# Patient Record
Sex: FEMALE | Race: WHITE | NOT HISPANIC OR LATINO | Employment: OTHER | URBAN - METROPOLITAN AREA
[De-identification: names, ages, dates, MRNs, and addresses within clinical notes are randomized per-mention and may not be internally consistent; named-entity substitution may affect disease eponyms.]

---

## 2017-01-04 ENCOUNTER — OFFICE VISIT (OUTPATIENT)
Dept: MEDICAL GROUP | Facility: MEDICAL CENTER | Age: 77
End: 2017-01-04
Payer: MEDICARE

## 2017-01-04 VITALS
BODY MASS INDEX: 28.61 KG/M2 | SYSTOLIC BLOOD PRESSURE: 124 MMHG | RESPIRATION RATE: 16 BRPM | TEMPERATURE: 97.8 F | OXYGEN SATURATION: 95 % | HEART RATE: 80 BPM | HEIGHT: 66 IN | DIASTOLIC BLOOD PRESSURE: 64 MMHG | WEIGHT: 178 LBS

## 2017-01-04 DIAGNOSIS — F41.9 ANXIETY: ICD-10-CM

## 2017-01-04 DIAGNOSIS — E78.5 DYSLIPIDEMIA: ICD-10-CM

## 2017-01-04 DIAGNOSIS — M81.0 OSTEOPOROSIS: ICD-10-CM

## 2017-01-04 DIAGNOSIS — R63.5 WEIGHT GAIN: ICD-10-CM

## 2017-01-04 DIAGNOSIS — I10 ESSENTIAL HYPERTENSION: ICD-10-CM

## 2017-01-04 DIAGNOSIS — R53.82 CHRONIC FATIGUE: ICD-10-CM

## 2017-01-04 PROCEDURE — 99214 OFFICE O/P EST MOD 30 MIN: CPT | Performed by: NURSE PRACTITIONER

## 2017-01-04 ASSESSMENT — PATIENT HEALTH QUESTIONNAIRE - PHQ9: CLINICAL INTERPRETATION OF PHQ2 SCORE: 0

## 2017-01-04 NOTE — ASSESSMENT & PLAN NOTE
DEXA scan from June of this year with high risk for fracture  Started on Fosamax at that time, she took this for several weeks and felt that it was causing bone and joint pain, she has since discontinued

## 2017-01-04 NOTE — MR AVS SNAPSHOT
"        Kelly Bello   2017 8:40 AM   Office Visit   MRN: [id number]    Department:  [facility]   Dept Phone:  [phone]    Description:  Female : 1940   Provider:  RAUDEL Cazares           Reason for Visit     Follow-Up anxiety, weight gain, bone density       Allergies as of 2017     No Known Allergies      You were diagnosed with     Essential hypertension   [3609576]       Chronic fatigue   [998957]       Weight gain   [735630]       Anxiety   [111191]       Dyslipidemia   [405248]       Osteoporosis   [4200141]         Vital Signs     Blood Pressure Pulse Temperature Respirations Height Weight    124/64 mmHg 80 36.6 °C (97.8 °F) 16 1.676 m (5' 5.98\") 80.74 kg (178 lb)    Body Mass Index Oxygen Saturation Smoking Status             28.74 kg/m2 95% Former Smoker         Basic Information     Date Of Birth Sex Race Ethnicity Preferred Language    1940 Female White Non- English      Problem List              ICD-10-CM Priority Class Noted - Resolved    History of MI (myocardial infarction) I25.2   2015 - Present    COPD (chronic obstructive pulmonary disease) (HCC) J44.9   2015 - Present    Elevated white blood cell count D72.829   2015 - Present    Decreased GFR R94.4   2015 - Present    Hypertension I10   2015 - Present    Bell palsy G51.0   2015 - Present    VERONA positive R76.8   1/15/2016 - Present    Raynaud's disease I73.00   1/15/2016 - Present    Hair thinning L65.9   1/15/2016 - Present    Chronic fatigue R53.82   1/15/2016 - Present    Anxiety F41.9   2016 - Present    Gastroesophageal reflux disease without esophagitis K21.9   2016 - Present    Left cataract H26.9   2016 - Present    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   11/15/2016 - Present    Dyslipidemia E78.5   2017 - Present    Osteoporosis M81.0   2017 - Present      Health Maintenance        Date Due Completion Dates    IMM DTaP/Tdap/Td " Vaccine (1 - Tdap) 7/31/1958 ---    IMM ZOSTER VACCINE 7/31/1999 ---    IMM PNEUMOCOCCAL 65+ (ADULT) LOW/MEDIUM RISK SERIES (1 of 2 - PCV13) 7/31/2004 ---    COLONOSCOPY 1/29/2017 1/29/2007 (Prv Comp)    Override on 1/29/2007: Previously completed    MAMMOGRAM 6/30/2017 6/30/2016, 6/23/2016    BONE DENSITY 6/30/2021 6/30/2016            Current Immunizations     Influenza Vaccine Adult HD 11/17/2016    Influenza Vaccine Quad Inj (Pf) 11/19/2015      Below and/or attached are the medications your provider expects you to take. Review all of your home medications and newly ordered medications with your provider and/or pharmacist. Follow medication instructions as directed by your provider and/or pharmacist. Please keep your medication list with you and share with your provider. Update the information when medications are discontinued, doses are changed, or new medications (including over-the-counter products) are added; and carry medication information at all times in the event of emergency situations     Allergies:  No Known Allergies          Medications  Valid as of: January 04, 2017 - 11:01 AM    Generic Name Brand Name Tablet Size Instructions for use    Albuterol Sulfate (Aero Soln) albuterol 108 (90 BASE) MCG/ACT Inhale 2 Puffs by mouth every 6 hours as needed for Shortness of Breath.        Atorvastatin Calcium (Tab) LIPITOR 20 MG TAKE 1 TAB BY MOUTH EVERY DAY.        Clopidogrel Bisulfate (Tab) PLAVIX 75 MG TAKE 1 TAB BY MOUTH EVERY DAY.        Ferrous Sulfate (Tab) Iron 325 (65 FE) MG Take 1 Tab by mouth 2 times a day, with meals.        Fluticasone Propionate (Suspension) FLONASE 50 MCG/ACT Spray 2 Sprays in nose every day.        HydroCHLOROthiazide (Tab) HYDRODIURIL 25 MG Take 1 Tab by mouth every day.        Naproxen Sodium   Take 1 Tab by mouth as needed.        Omeprazole (CAPSULE DELAYED RELEASE) PRILOSEC 20 MG TAKE 1 CAPSULE EVERY DAY        Sertraline HCl (Tab) ZOLOFT 50 MG Take 1 Tab by mouth every  day.        Triamcinolone Acetonide (Cream) KENALOG 0.1 % Apply 2-4 g to affected area(s) 2 times a day.        .                 Medicines prescribed today were sent to:     [id number] [place], [place] - [address]    [address] [facility] [place] [address]    Phone: [phone] Fax: [phone]    Open 24 Hours?: No      Medication refill instructions:       If your prescription bottle indicates you have medication refills left, it is not necessary to call your provider’s office. Please contact your pharmacy and they will refill your medication.    If your prescription bottle indicates you do not have any refills left, you may request refills at any time through one of the following ways: The online [employer] system (except Urgent Care), by calling your provider’s office, or by asking your pharmacy to contact your provider’s office with a refill request. Medication refills are processed only during regular business hours and may not be available until the next business day. Your provider may request additional information or to have a follow-up visit with you prior to refilling your medication.   *Please Note: Medication refills are assigned a new Rx number when refilled electronically. Your pharmacy may indicate that no refills were authorized even though a new prescription for the same medication is available at the pharmacy. Please request the medicine by name with the pharmacy before contacting your provider for a refill.        Your To Do List     Future Labs/Procedures Complete By Expires    COMP METABOLIC PANEL  As directed 1/5/2018    LIPID PROFILE  As directed 1/5/2018    TSH WITH REFLEX TO FT4  As directed 1/4/2018         [employer] Access Code: [email or web address]  Expires: 1/13/2017  8:55 AM    [employer]  A secure, online tool to manage your health information     [facility]’s [employer]® is a secure, online tool that connects you to your personalized health information from the privacy of your home -- day  or night - making it very easy for you to manage your healthcare. Once the activation process is completed, you can even access your medical information using the GoPlanit faith, which is available for free in the [employer] Faith store or Google Play store.     GoPlanit provides the following levels of access (as shown below):   My Chart Features   [facility] Doctor [facility]  Specialists [facility]  Urgent  Care Non-[facility]  Primary Care  Doctor   Email your healthcare team securely and privately 24/7 X X X    Manage appointments: schedule your next appointment; view details of past/upcoming appointments X      Request prescription refills. X      View recent personal medical records, including lab and immunizations X X X X   View health record, including health history, allergies, medications X X X X   Read reports about your outpatient visits, procedures, consult and ER notes X X X X   See your discharge summary, which is a recap of your hospital and/or ER visit that includes your diagnosis, lab results, and care plan. X X       How to register for GoPlanit:  1. Go to  [email or web address].  2. Click on the Sign Up Now box, which takes you to the New Member Sign Up page. You will need to provide the following information:  a. Enter your GoPlanit Access Code exactly as it appears at the top of this page. (You will not need to use this code after you’ve completed the sign-up process. If you do not sign up before the expiration date, you must request a new code.)   b. Enter your date of birth.   c. Enter your home email address.   d. Click Submit, and follow the next screen’s instructions.  3. Create a GoPlanit ID. This will be your GoPlanit login ID and cannot be changed, so think of one that is secure and easy to remember.  4. Create a GoPlanit password. You can change your password at any time.  5. Enter your Password Reset Question and Answer. This can be used at a later time if you forget your password.   6. Enter your  e-mail address. This allows you to receive e-mail notifications when new information is available in [employer].  7. Click Sign Up. You can now view your health information.    For assistance activating your [employer] account, call [phone]

## 2017-01-04 NOTE — PROGRESS NOTES
Subjective:     Chief Complaint   Patient presents with   • Follow-Up     anxiety, weight gain, bone density      Kelly Bello is a 76 y.o. female here today to follow up on:    Anxiety  started trial of Paxil in November, took this for approximately 2 weeks. Felt that it was interfering with her sleep, she was generally feeling irritable. Has failed Celexa and Wellbutrin in the past, she is interested in alternative. She continues having slight anxiety, low mood  No appetite change, no panic attack, no suicidal ideation    Chronic fatigue  Continues to complain of generally poor energy level despite getting adequate rest. She is gradually been gaining weight per her report although he sees no significant weight gain in the last 6 months per our scale. She does have intermittent constipation, dry skin  No heat or cold intolerance    Dyslipidemia  Tolerating atorvastatin without issue, due to recheck labs    Hypertension  Blood pressure remained stable with hydrochlorothiazide. No chest pain, palpitation, dizziness    Osteoporosis  DEXA scan from June of this year with high risk for fracture  Started on Fosamax at that time, she took this for several weeks and felt that it was causing bone and joint pain, she has since discontinued       Current medicines (including changes today)  Current Outpatient Prescriptions   Medication Sig Dispense Refill   • clopidogrel (PLAVIX) 75 MG Tab TAKE 1 TAB BY MOUTH EVERY DAY. 90 Tab 1   • atorvastatin (LIPITOR) 20 MG Tab TAKE 1 TAB BY MOUTH EVERY DAY. 90 Tab 1   • omeprazole (PRILOSEC) 20 MG delayed-release capsule TAKE 1 CAPSULE EVERY DAY 90 Cap 1   • triamcinolone acetonide (KENALOG) 0.1 % Cream Apply 2-4 g to affected area(s) 2 times a day. 1 Tube 0   • hydrochlorothiazide (HYDRODIURIL) 25 MG Tab Take 1 Tab by mouth every day. 90 Tab 1   • Ferrous Sulfate (IRON) 325 (65 FE) MG TABS Take 1 Tab by mouth 2 times a day, with meals.     • Naproxen Sodium (ALEVE PO) Take 1 Tab  "by mouth as needed.     • albuterol (VENTOLIN OR PROVENTIL) 108 (90 BASE) MCG/ACT Aero Soln inhalation aerosol Inhale 2 Puffs by mouth every 6 hours as needed for Shortness of Breath. 8.5 g 3   • fluticasone (FLONASE) 50 MCG/ACT nasal spray Spray 2 Sprays in nose every day. 1 Bottle 0     No current facility-administered medications for this visit.     She  has a past medical history of Heart burn; Dental disorder; Indigestion; Venous disease; Hypertension (4/1/2015); COPD (chronic obstructive pulmonary disease) ([facility]); Myocardial infarct ([facility]) (1996); Hiatus hernia syndrome; Anesthesia; Cancer ([facility]) (2006); Emphysema of lung ([facility]); Pneumonia (2007); and Shingles (2014).    ROS included above     Objective:     Blood pressure 124/64, pulse 80, temperature 36.6 °C (97.8 °F), resp. rate 16, height 1.676 m (5' 5.98\"), weight 80.74 kg (178 lb), SpO2 95 %. Body mass index is 28.74 kg/(m^2).     Physical Exam:  General: Alert, oriented in no acute distress.  Eye contact is good, speech is normal, affect calm  Lungs: clear to auscultation bilaterally, normal effort, no wheeze/ rhonchi/ rales.  CV: regular rate and rhythm, S1, S2  Abdomen: soft, nontender  Ext: no edema, color normal, vascularity normal, temperature normal. Dry skin   Assessment and Plan:   The following treatment plan was discussed   1. Essential hypertension   stable, continue medication  COMP METABOLIC PANEL   2. Chronic fatigue   ongoing difficulty with energy level despite getting adequate rest.  TSH WITH REFLEX TO FT4   3. Weight gain  TSH WITH REFLEX TO FT4   4. Anxiety   no improvement with Paxil, Celexa, Wellbutrin. She would like to try alternative. May start trial of Zoloft 50 mg daily at bedtime. Risks benefits and side effects reviewed, she will notify me of any concerns. discussed need for sustained treatment to obtain results    5. Dyslipidemia   on atorvastatin, recheck labs  LIPID PROFILE   6. Osteoporosis   did not tolerate Fosamax. Will " refer for prolia        Followup: pending labs           Please note that this dictation was created using voice recognition software. I have worked with consultants from the vendor as well as technical experts from [facility] to optimize the interface. I have made every reasonable attempt to correct obvious errors, but I expect that there are errors of grammar and possibly content that I did not discover before finalizing the note.

## 2017-01-04 NOTE — ASSESSMENT & PLAN NOTE
Continues to complain of generally poor energy level despite getting adequate rest. She is gradually been gaining weight per her report although he sees no significant weight gain in the last 6 months per our scale. She does have intermittent constipation, dry skin  No heat or cold intolerance

## 2017-01-04 NOTE — ASSESSMENT & PLAN NOTE
started trial of Paxil in November, took this for approximately 2 weeks. Felt that it was interfering with her sleep, she was generally feeling irritable. Has failed Celexa and Wellbutrin in the past, she is interested in alternative. She continues having slight anxiety, low mood  No appetite change, no panic attack, no suicidal ideation

## 2017-01-10 ENCOUNTER — HOSPITAL ENCOUNTER (OUTPATIENT)
Dept: LAB | Facility: MEDICAL CENTER | Age: 77
End: 2017-01-10
Attending: NURSE PRACTITIONER
Payer: MEDICARE

## 2017-01-10 ENCOUNTER — TELEPHONE (OUTPATIENT)
Dept: MEDICAL GROUP | Facility: MEDICAL CENTER | Age: 77
End: 2017-01-10

## 2017-01-10 DIAGNOSIS — R53.82 CHRONIC FATIGUE: ICD-10-CM

## 2017-01-10 DIAGNOSIS — I10 ESSENTIAL HYPERTENSION: ICD-10-CM

## 2017-01-10 DIAGNOSIS — R63.5 WEIGHT GAIN: ICD-10-CM

## 2017-01-10 DIAGNOSIS — E78.5 DYSLIPIDEMIA: ICD-10-CM

## 2017-01-10 LAB
ALBUMIN SERPL BCP-MCNC: 4.1 G/DL (ref 3.2–4.9)
ALBUMIN/GLOB SERPL: 1.2 G/DL
ALP SERPL-CCNC: 62 U/L (ref 30–99)
ALT SERPL-CCNC: 11 U/L (ref 2–50)
ANION GAP SERPL CALC-SCNC: 5 MMOL/L (ref 0–11.9)
AST SERPL-CCNC: 14 U/L (ref 12–45)
BILIRUB SERPL-MCNC: 0.5 MG/DL (ref 0.1–1.5)
BUN SERPL-MCNC: 22 MG/DL (ref 8–22)
CALCIUM SERPL-MCNC: 9.4 MG/DL (ref 8.5–10.5)
CHLORIDE SERPL-SCNC: 102 MMOL/L (ref 96–112)
CHOLEST SERPL-MCNC: 145 MG/DL (ref 100–199)
CO2 SERPL-SCNC: 30 MMOL/L (ref 20–33)
CREAT SERPL-MCNC: 1.2 MG/DL (ref 0.5–1.4)
GLOBULIN SER CALC-MCNC: 3.4 G/DL (ref 1.9–3.5)
GLUCOSE SERPL-MCNC: 107 MG/DL (ref 65–99)
HDLC SERPL-MCNC: 36 MG/DL
LDLC SERPL CALC-MCNC: 82 MG/DL
POTASSIUM SERPL-SCNC: 3.8 MMOL/L (ref 3.6–5.5)
PROT SERPL-MCNC: 7.5 G/DL (ref 6–8.2)
SODIUM SERPL-SCNC: 137 MMOL/L (ref 135–145)
TRIGL SERPL-MCNC: 134 MG/DL (ref 0–149)
TSH SERPL DL<=0.005 MIU/L-ACNC: 1.4 UIU/ML (ref 0.3–3.7)

## 2017-01-10 PROCEDURE — 80061 LIPID PANEL: CPT

## 2017-01-10 PROCEDURE — 84443 ASSAY THYROID STIM HORMONE: CPT

## 2017-01-10 PROCEDURE — 80053 COMPREHEN METABOLIC PANEL: CPT

## 2017-01-10 PROCEDURE — 36415 COLL VENOUS BLD VENIPUNCTURE: CPT

## 2017-01-10 NOTE — TELEPHONE ENCOUNTER
----- Message from RAUDEL Cazares sent at 1/10/2017  2:32 PM PST -----  Please inform patient:  Thyroid function looks good. Kidney function, liver function, electrolytes normal. Cholesterol is looking much better than in the past- continue with current medications.

## 2017-03-03 RX ORDER — HYDROCHLOROTHIAZIDE 25 MG/1
TABLET ORAL
Qty: 30 TAB | Refills: 5 | Status: SHIPPED | OUTPATIENT
Start: 2017-03-03 | End: 2017-12-11

## 2017-03-16 ENCOUNTER — OFFICE VISIT (OUTPATIENT)
Dept: URGENT CARE | Facility: PHYSICIAN GROUP | Age: 77
End: 2017-03-16
Payer: MEDICARE

## 2017-03-16 VITALS
RESPIRATION RATE: 16 BRPM | WEIGHT: 165 LBS | HEART RATE: 68 BPM | DIASTOLIC BLOOD PRESSURE: 78 MMHG | SYSTOLIC BLOOD PRESSURE: 124 MMHG | TEMPERATURE: 97.5 F | BODY MASS INDEX: 26.52 KG/M2 | HEIGHT: 66 IN | OXYGEN SATURATION: 92 %

## 2017-03-16 DIAGNOSIS — J44.1 COPD EXACERBATION (HCC): ICD-10-CM

## 2017-03-16 DIAGNOSIS — R05.9 COUGH: ICD-10-CM

## 2017-03-16 PROCEDURE — G8432 DEP SCR NOT DOC, RNG: HCPCS | Performed by: EMERGENCY MEDICINE

## 2017-03-16 PROCEDURE — 1036F TOBACCO NON-USER: CPT | Performed by: EMERGENCY MEDICINE

## 2017-03-16 PROCEDURE — 1101F PT FALLS ASSESS-DOCD LE1/YR: CPT | Performed by: EMERGENCY MEDICINE

## 2017-03-16 PROCEDURE — G8482 FLU IMMUNIZE ORDER/ADMIN: HCPCS | Performed by: EMERGENCY MEDICINE

## 2017-03-16 PROCEDURE — 4040F PNEUMOC VAC/ADMIN/RCVD: CPT | Mod: 8P | Performed by: EMERGENCY MEDICINE

## 2017-03-16 PROCEDURE — G8420 CALC BMI NORM PARAMETERS: HCPCS | Performed by: EMERGENCY MEDICINE

## 2017-03-16 PROCEDURE — 99213 OFFICE O/P EST LOW 20 MIN: CPT | Performed by: EMERGENCY MEDICINE

## 2017-03-16 RX ORDER — AMOXICILLIN AND CLAVULANATE POTASSIUM 875; 125 MG/1; MG/1
1 TABLET, FILM COATED ORAL 2 TIMES DAILY
Qty: 20 TAB | Refills: 0 | Status: SHIPPED | OUTPATIENT
Start: 2017-03-16 | End: 2017-03-26

## 2017-03-16 RX ORDER — BENZONATATE 100 MG/1
100 CAPSULE ORAL 3 TIMES DAILY PRN
Qty: 60 CAP | Refills: 0 | Status: SHIPPED | OUTPATIENT
Start: 2017-03-16 | End: 2017-07-05

## 2017-03-16 ASSESSMENT — ENCOUNTER SYMPTOMS
NERVOUS/ANXIOUS: 0
HEMOPTYSIS: 0
CHILLS: 0
BACK PAIN: 0
SHORTNESS OF BREATH: 0
SPUTUM PRODUCTION: 1
SPEECH CHANGE: 0
FEVER: 1
ABDOMINAL PAIN: 0
SENSORY CHANGE: 0
SORE THROAT: 0
NECK PAIN: 0
COUGH: 1
NAUSEA: 0
VOMITING: 0

## 2017-03-16 ASSESSMENT — COPD QUESTIONNAIRES: COPD: 1

## 2017-03-16 NOTE — PROGRESS NOTES
"Subjective:      Kelly Bello is a 76 y.o. female who presents with Sinusitis            Sinusitis  This is a new problem. The current episode started in the past 7 days. The problem has been gradually worsening since onset. The maximum temperature recorded prior to her arrival was 100.4 - 100.9 F. The pain is moderate. Associated symptoms include congestion and coughing. Pertinent negatives include no chills, neck pain, shortness of breath, sneezing or sore throat.   Cough  This is a new problem. The current episode started in the past 7 days. The cough is productive of purulent sputum. Associated symptoms include a fever. Pertinent negatives include no chills, hemoptysis, rash, sore throat or shortness of breath. She has tried a beta-agonist inhaler for the symptoms. Her past medical history is significant for COPD.   No Known Allergies   Social History     Social History   • Marital Status: Single     Spouse Name: N/A   • Number of Children: N/A   • Years of Education: N/A     Occupational History   • Not on file.     Social History Main Topics   • Smoking status: Former Smoker -- 2.00 packs/day for 25 years     Types: Cigarettes     Quit date: 06/06/1996   • Smokeless tobacco: Never Used   • Alcohol Use: 0.0 oz/week     0 Standard drinks or equivalent per week      Comment: once a week   • Drug Use: No   • Sexual Activity: No     Other Topics Concern   • Not on file     Social History Narrative    Works at [employer], lives in [place], , lives alone with daughters nearby     Past Medical History   Diagnosis Date   • Heart burn    • Dental disorder      dentures   • Indigestion    • Venous disease      left leg vein removal   • Hypertension 4/1/2015   • COPD (chronic obstructive pulmonary disease) (CMS-HCC)    • Myocardial infarct (CMS-HCC) 1996   • Hiatus hernia syndrome    • Anesthesia      Once she had too much anesthesia \"didnt come out of it for 6hours\"   • Cancer (CMS-HCC) 2006     esophageal " cancer polyp. Repeat endoscopy 3-4 years later was benign.    • Emphysema of lung (CMS-HCC)    • Pneumonia 2007   • Shingles 2014     Current Outpatient Prescriptions on File Prior to Visit   Medication Sig Dispense Refill   • hydrochlorothiazide (HYDRODIURIL) 25 MG Tab TAKE 1 TAB BY MOUTH EVERY DAY. 30 Tab 5   • sertraline (ZOLOFT) 50 MG Tab Take 1 Tab by mouth every day. 30 Tab 1   • clopidogrel (PLAVIX) 75 MG Tab TAKE 1 TAB BY MOUTH EVERY DAY. 90 Tab 1   • atorvastatin (LIPITOR) 20 MG Tab TAKE 1 TAB BY MOUTH EVERY DAY. 90 Tab 1   • omeprazole (PRILOSEC) 20 MG delayed-release capsule TAKE 1 CAPSULE EVERY DAY 90 Cap 1   • triamcinolone acetonide (KENALOG) 0.1 % Cream Apply 2-4 g to affected area(s) 2 times a day. 1 Tube 0   • hydrochlorothiazide (HYDRODIURIL) 25 MG Tab Take 1 Tab by mouth every day. 90 Tab 1   • albuterol (VENTOLIN OR PROVENTIL) 108 (90 BASE) MCG/ACT Aero Soln inhalation aerosol Inhale 2 Puffs by mouth every 6 hours as needed for Shortness of Breath. 8.5 g 3   • fluticasone (FLONASE) 50 MCG/ACT nasal spray Spray 2 Sprays in nose every day. 1 Bottle 0   • Ferrous Sulfate (IRON) 325 (65 FE) MG TABS Take 1 Tab by mouth 2 times a day, with meals.     • Naproxen Sodium (ALEVE PO) Take 1 Tab by mouth as needed.       No current facility-administered medications on file prior to visit.     Family History   Problem Relation Age of Onset   • Cancer Father      lung, smoker   • Cancer Brother      kidney, liver   • Arthritis Sister      Rheumatoid Arthritis       Review of Systems   Constitutional: Positive for fever. Negative for chills.   HENT: Positive for congestion. Negative for ear discharge, nosebleeds, sneezing and sore throat.    Respiratory: Positive for cough and sputum production. Negative for hemoptysis and shortness of breath.    Gastrointestinal: Negative for nausea, vomiting and abdominal pain.   Genitourinary: Negative.    Musculoskeletal: Negative for back pain and neck pain.   Skin:  "Negative for rash.   Neurological: Negative for sensory change and speech change.   Psychiatric/Behavioral: The patient is not nervous/anxious.           Objective:     /78 mmHg  Pulse 68  Temp(Src) 36.4 °C (97.5 °F)  Resp 16  Ht 1.676 m (5' 6\")  Wt 74.844 kg (165 lb)  BMI 26.64 kg/m2  SpO2 92%     Physical Exam   Constitutional: She appears well-developed and well-nourished. No distress.   Patient states she is not short of breath but states her pulse oximetries usually in the 95 range unable to recorded greater than 90-93. Patient declined chest x-ray.   HENT:   Head: Normocephalic and atraumatic.   Right Ear: External ear normal.   Left Ear: External ear normal.   Eyes: Right eye exhibits no discharge. Left eye exhibits no discharge.   Cardiovascular: Normal rate and regular rhythm.    Pulmonary/Chest: Effort normal. No respiratory distress. She has no wheezes.   Abdominal: She exhibits no distension.   Skin: Skin is warm. She is not diaphoretic.   Psychiatric: She has a normal mood and affect. Her behavior is normal.   Vitals reviewed.            Repeat pulse oximetry 93% in multiple fingers patient declined x-ray.  Assessment/Plan:     Diagnosis: COPD exacerbation/sinusitis    I am recommending the patient initiate/ continue hydration efforts including the use of a vaporizer/humidifier patient states her daughter has an vaporizer/ netti pot. I also recommend the pt, initiate Mucinex.. In addition the patient will initiate the prescribed prescription medication/s: Patient will be placed on Augmentin 875/125, also given Tessalon Perles patient will continue using her inhaler... If the patient's condition exacerbates with worsening dysphagia, shortness of breath, uncontrolled fever, headache or chest pressure he/she will return immediately to the urgent care or go to  the emergency department for further evaluation. Patient will follow-up with her primary care provider if symptoms continue or " bradley Kerr

## 2017-04-05 DIAGNOSIS — I10 ESSENTIAL HYPERTENSION: ICD-10-CM

## 2017-04-05 RX ORDER — HYDROCHLOROTHIAZIDE 25 MG/1
25 TABLET ORAL DAILY
Qty: 90 TAB | Refills: 1 | Status: SHIPPED | OUTPATIENT
Start: 2017-04-05 | End: 2017-07-05

## 2017-04-05 NOTE — TELEPHONE ENCOUNTER
Was the patient seen in the last year in this department? Yes     Does patient have an active prescription for medications requested? No     Received Request Via: Pharmacy     Pt is there now, asking to approve a 3 month supply as this is what insurance will approve.

## 2017-05-03 ENCOUNTER — OFFICE VISIT (OUTPATIENT)
Dept: URGENT CARE | Facility: PHYSICIAN GROUP | Age: 77
End: 2017-05-03
Payer: MEDICARE

## 2017-05-03 VITALS
WEIGHT: 165 LBS | BODY MASS INDEX: 26.52 KG/M2 | TEMPERATURE: 98.3 F | HEART RATE: 79 BPM | SYSTOLIC BLOOD PRESSURE: 124 MMHG | HEIGHT: 66 IN | DIASTOLIC BLOOD PRESSURE: 66 MMHG | OXYGEN SATURATION: 94 %

## 2017-05-03 DIAGNOSIS — J22 LRTI (LOWER RESPIRATORY TRACT INFECTION): ICD-10-CM

## 2017-05-03 PROCEDURE — 99214 OFFICE O/P EST MOD 30 MIN: CPT | Performed by: FAMILY MEDICINE

## 2017-05-03 PROCEDURE — 1101F PT FALLS ASSESS-DOCD LE1/YR: CPT | Performed by: FAMILY MEDICINE

## 2017-05-03 PROCEDURE — 1036F TOBACCO NON-USER: CPT | Performed by: FAMILY MEDICINE

## 2017-05-03 PROCEDURE — G8432 DEP SCR NOT DOC, RNG: HCPCS | Performed by: FAMILY MEDICINE

## 2017-05-03 PROCEDURE — G8419 CALC BMI OUT NRM PARAM NOF/U: HCPCS | Performed by: FAMILY MEDICINE

## 2017-05-03 PROCEDURE — 4040F PNEUMOC VAC/ADMIN/RCVD: CPT | Mod: 8P | Performed by: FAMILY MEDICINE

## 2017-05-03 RX ORDER — AMOXICILLIN AND CLAVULANATE POTASSIUM 875; 125 MG/1; MG/1
1 TABLET, FILM COATED ORAL 2 TIMES DAILY
Qty: 14 TAB | Refills: 0 | Status: SHIPPED | OUTPATIENT
Start: 2017-05-03 | End: 2017-05-10

## 2017-05-03 NOTE — PATIENT INSTRUCTIONS

## 2017-05-08 DIAGNOSIS — Z12.31 ENCOUNTER FOR SCREENING MAMMOGRAM FOR BREAST CANCER: ICD-10-CM

## 2017-05-16 ASSESSMENT — ENCOUNTER SYMPTOMS
CHILLS: 0
SHORTNESS OF BREATH: 1
FEVER: 0
COUGH: 1
WHEEZING: 1

## 2017-05-16 NOTE — PROGRESS NOTES
"Subjective:      Kelly Bello is a 76 y.o. female who presents with Cough            Cough  This is a new problem. The current episode started in the past 7 days. The problem has been gradually worsening. Associated symptoms include shortness of breath and wheezing. Pertinent negatives include no chills or fever.       Review of Systems   Constitutional: Negative for fever and chills.   Respiratory: Positive for cough, shortness of breath and wheezing.      No Known Allergies      Objective:     /66 mmHg  Pulse 79  Temp(Src) 36.8 °C (98.3 °F)  Ht 1.676 m (5' 6\")  Wt 74.844 kg (165 lb)  BMI 26.64 kg/m2  SpO2 94%     Physical Exam   Constitutional: She is oriented to person, place, and time. She appears well-developed and well-nourished. No distress.   HENT:   Head: Normocephalic and atraumatic.   Eyes: Conjunctivae and EOM are normal. Pupils are equal, round, and reactive to light.   Cardiovascular: Normal rate and regular rhythm.    No murmur heard.  Pulmonary/Chest: Effort normal. No respiratory distress. She has wheezes. She has no rales.   Abdominal: Soft. She exhibits no distension. There is no tenderness.   Neurological: She is alert and oriented to person, place, and time. She has normal reflexes. No sensory deficit.   Skin: Skin is warm and dry.   Psychiatric: She has a normal mood and affect.               Assessment/Plan:     1. LRTI (lower respiratory tract infection)  Differential diagnosis, natural history, supportive care, and indications for immediate follow-up discussed.   - amoxicillin-clavulanate (AUGMENTIN) 875-125 MG Tab; Take 1 Tab by mouth 2 times a day for 7 days.  Dispense: 14 Tab; Refill: 0        "

## 2017-05-30 RX ORDER — ATORVASTATIN CALCIUM 20 MG/1
TABLET, FILM COATED ORAL
Qty: 90 TAB | Refills: 1 | Status: SHIPPED | OUTPATIENT
Start: 2017-05-30 | End: 2017-12-07 | Stop reason: SDUPTHER

## 2017-05-30 RX ORDER — OMEPRAZOLE 20 MG/1
CAPSULE, DELAYED RELEASE ORAL
Qty: 90 CAP | Refills: 1 | Status: SHIPPED | OUTPATIENT
Start: 2017-05-30 | End: 2018-01-22 | Stop reason: SDUPTHER

## 2017-06-28 ENCOUNTER — PATIENT OUTREACH (OUTPATIENT)
Dept: HEALTH INFORMATION MANAGEMENT | Facility: OTHER | Age: 77
End: 2017-06-28

## 2017-06-28 NOTE — PROGRESS NOTES
Attempt #:2    WebIZ Checked & Epic Updated: yes  [facility] Verified: yes  Verify PCP: yes    Communication Preference Obtained: yes     Review Care Team: yes    Annual Wellness Visit Scheduling  1. Scheduling Status:Scheduled     Care Coordination Enrollment   2. Is patient appropriate: YES  3. Is patient interested:  YES -- SCHEDULED TO SPEAK WITH ASHLEY GARVEY   Care Gap Scheduling      Health Maintenance Due   Topic Date Due   • Annual Wellness Visit  SCHEDULED   • PFT SCREENING-FEV1 AND FEV/FVC RATIO / SPIROMETRY SHOULD BE PERFORMED ANNUALLY  NOT DISCUSSED   • IMM DTaP/Tdap/Td Vaccine (1 - Tdap) DECLINED   • IMM ZOSTER VACCINE  DECLINED DUE TO PRICE   • IMM PNEUMOCOCCAL 65+ (ADULT) LOW/MEDIUM RISK SERIES (1 of 2 - PCV13) DECLINED   • COLONOSCOPY  NOT DISCUSSED         [employer] Activation: sent activation code  [employer] Faith: no  Virtual Visits: yes  Opt In to Text Messages: yes

## 2017-06-29 ENCOUNTER — TELEPHONE (OUTPATIENT)
Dept: MEDICAL GROUP | Facility: MEDICAL CENTER | Age: 77
End: 2017-06-29

## 2017-06-29 NOTE — TELEPHONE ENCOUNTER
ANNUAL WELLNESS VISIT PRE-VISIT PLANNING     1.  Reviewed note from last office visit with PCP: YES 1/2017    2.  If any orders were placed at last visit, do we have Results/Consult Notes?        •  Labs - Labs ordered, NOT completed. Patient advised to complete prior to next appointment.       •  Imaging - Imaging was not ordered at last office visit.       •  Referrals - No referrals were ordered at last office visit.    3.  Immunizations were updated in [facility] using WebIZ?: Yes       •  WebIZ Recommendations: PCV-13 (Prevnar 13)  Hep A, adult  Zoster (Shingles)  Tdap       •  Is patient due for Tdap? YES. Patient was notified of copay.       •  Is patient due for Shingles? NO     4.  Patient is due for the following Health Maintenance Topics:   Health Maintenance Due   Topic Date Due   • Annual Wellness Visit  1940   • PFT SCREENING-FEV1 AND FEV/FVC RATIO / SPIROMETRY SHOULD BE PERFORMED ANNUALLY  07/31/1957   • COLONOSCOPY  01/29/2017       - Patient is up-to-date on all Health Maintenance topics. No records have been requested at this time.    5.  Reviewed/Updated the following with patient:       •   Preferred Pharmacy? NO       •   Preferred Lab? NO       •   Medications? NO       •   Social History? NO       •   Family History? NO    6.  Care Team Updated:       •   DME Company (gait device, O2, CPAP, etc.): NO       •   Other Specialists (eye doctor, derm, GYN, cardiology, endo, etc): NO    7.  Patient has the following Care Path diagnoses on Problem List:  NONE    8.  Specialty Comments was updated with diagnosis information provided by [facility]: NO    9.  Patient was not advised: “This is a free wellness visit. The provider will screen for medical conditions to help you stay healthy. If you have other concerns to address you may be asked to discuss these at a separate visit or there may be an additional fee.”     6.  Patient was NOT informed to arrive 15 min prior to their scheduled appointment and bring  in their medication bottles.

## 2017-06-29 NOTE — TELEPHONE ENCOUNTER
Left message for patient to call back regarding pre-visit planning. Please transfer call to [phone].

## 2017-07-05 ENCOUNTER — OFFICE VISIT (OUTPATIENT)
Dept: MEDICAL GROUP | Facility: MEDICAL CENTER | Age: 77
End: 2017-07-05
Payer: MEDICARE

## 2017-07-05 ENCOUNTER — TELEPHONE (OUTPATIENT)
Dept: MEDICAL GROUP | Facility: MEDICAL CENTER | Age: 77
End: 2017-07-05

## 2017-07-05 VITALS
WEIGHT: 178 LBS | OXYGEN SATURATION: 93 % | DIASTOLIC BLOOD PRESSURE: 62 MMHG | SYSTOLIC BLOOD PRESSURE: 130 MMHG | HEIGHT: 65 IN | BODY MASS INDEX: 29.66 KG/M2 | HEART RATE: 67 BPM | TEMPERATURE: 97 F

## 2017-07-05 DIAGNOSIS — M81.0 OSTEOPOROSIS WITHOUT CURRENT PATHOLOGICAL FRACTURE, UNSPECIFIED OSTEOPOROSIS TYPE: ICD-10-CM

## 2017-07-05 DIAGNOSIS — J41.1 MUCOPURULENT CHRONIC BRONCHITIS (HCC): ICD-10-CM

## 2017-07-05 DIAGNOSIS — K21.9 GASTROESOPHAGEAL REFLUX DISEASE WITHOUT ESOPHAGITIS: ICD-10-CM

## 2017-07-05 DIAGNOSIS — Z91.09 ENVIRONMENTAL ALLERGIES: ICD-10-CM

## 2017-07-05 DIAGNOSIS — E78.5 DYSLIPIDEMIA: ICD-10-CM

## 2017-07-05 DIAGNOSIS — Z00.00 MEDICARE ANNUAL WELLNESS VISIT, SUBSEQUENT: ICD-10-CM

## 2017-07-05 DIAGNOSIS — I25.2 HISTORY OF MI (MYOCARDIAL INFARCTION): ICD-10-CM

## 2017-07-05 DIAGNOSIS — Z12.11 ENCOUNTER FOR FIT (FECAL IMMUNOCHEMICAL TEST) SCREENING: ICD-10-CM

## 2017-07-05 DIAGNOSIS — I10 ESSENTIAL HYPERTENSION: ICD-10-CM

## 2017-07-05 DIAGNOSIS — F41.9 ANXIETY: ICD-10-CM

## 2017-07-05 PROCEDURE — G0439 PPPS, SUBSEQ VISIT: HCPCS | Performed by: NURSE PRACTITIONER

## 2017-07-05 RX ORDER — ASPIRIN 325 MG
325 TABLET ORAL DAILY
COMMUNITY

## 2017-07-05 RX ORDER — FLUTICASONE PROPIONATE 50 MCG
2 SPRAY, SUSPENSION (ML) NASAL DAILY
Qty: 1 BOTTLE | Refills: 0 | Status: SHIPPED | OUTPATIENT
Start: 2017-07-05 | End: 2017-10-11 | Stop reason: SDUPTHER

## 2017-07-05 RX ORDER — CLOPIDOGREL BISULFATE 75 MG/1
75 TABLET ORAL
Qty: 90 TAB | Refills: 1 | Status: SHIPPED | OUTPATIENT
Start: 2017-07-05 | End: 2018-01-09 | Stop reason: SDUPTHER

## 2017-07-05 ASSESSMENT — PATIENT HEALTH QUESTIONNAIRE - PHQ9: CLINICAL INTERPRETATION OF PHQ2 SCORE: 0

## 2017-07-05 NOTE — PROGRESS NOTES
Chief Complaint   Patient presents with   • Annual Wellness Visit         HPI:  Kelly Bello is a 76 y.o. female here for Medicare Annual Wellness Visit  Hypertension  Continues to be stable with hydrochlorothiazide  No chest pain, dizziness  COPD (chronic obstructive pulmonary disease)  Continues with rare use of albuterol  Osteoporosis  SE with fosamax  Was referred for prolia but told it was not covered by insurance  No history of fracture  Anxiety  Failed paxil, celexa, wellbutrin, zoloft  Had done well in the past with budeprion but for some reason did not tolerate the  buproprion as well  Still having intermittent difficulty with anxiety  Denies depression, panic attack, appetite change  Dyslipidemia  Continues to be well controlled with atorvastatin  Tolerating medication without side effect including myalgia  History of MI (myocardial infarction)  2006  Continues with daily aspirin, atorvastatin, plavix  No recent chest pain, activity intolerance  Gastroesophageal reflux disease without esophagitis  Well-controlled with omeprazole  Environmental allergies  Congestion, rhinitis, improved with Flonase and needing refill  No facial pain or pressure              Patient Active Problem List    Diagnosis Date Noted   • Dyslipidemia 01/04/2017   • Osteoporosis 01/04/2017   • 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   • Left cataract 11/01/2016   • Anxiety 06/09/2016   • Gastroesophageal reflux disease without esophagitis 06/09/2016   • VERONA positive 01/15/2016   • Raynaud's disease 01/15/2016   • Hair thinning 01/15/2016   • Chronic fatigue 01/15/2016   • Bell palsy 05/14/2015   • Hypertension 04/01/2015   • History of MI (myocardial infarction) 01/29/2015   • COPD (chronic obstructive pulmonary disease) (CMS-[facility]) 01/29/2015   • Elevated white blood cell count 01/29/2015   • Decreased GFR 01/29/2015       Current Outpatient Prescriptions   Medication Sig Dispense Refill   • aspirin (ASA) 325 MG Tab Take  325 mg by mouth every 6 hours as needed.     • omeprazole (PRILOSEC) 20 MG delayed-release capsule TAKE 1 CAPSULE EVERY DAY 90 Cap 1   • atorvastatin (LIPITOR) 20 MG Tab TAKE 1 TAB BY MOUTH EVERY DAY. 90 Tab 1   • hydrochlorothiazide (HYDRODIURIL) 25 MG Tab TAKE 1 TAB BY MOUTH EVERY DAY. 30 Tab 5   • clopidogrel (PLAVIX) 75 MG Tab TAKE 1 TAB BY MOUTH EVERY DAY. 90 Tab 1   • albuterol (VENTOLIN OR PROVENTIL) 108 (90 BASE) MCG/ACT Aero Soln inhalation aerosol Inhale 2 Puffs by mouth every 6 hours as needed for Shortness of Breath. 8.5 g 3   • fluticasone (FLONASE) 50 MCG/ACT nasal spray Spray 2 Sprays in nose every day. 1 Bottle 0   • Ferrous Sulfate (IRON) 325 (65 FE) MG TABS Take 1 Tab by mouth 2 times a day, with meals.     • Naproxen Sodium (ALEVE PO) Take 1 Tab by mouth as needed.     • hydrochlorothiazide (HYDRODIURIL) 25 MG Tab Take 1 Tab by mouth every day. (Patient not taking: Reported on 7/5/2017) 90 Tab 1   • benzonatate (TESSALON) 100 MG Cap Take 1 Cap by mouth 3 times a day as needed for Cough. (Patient not taking: Reported on 7/5/2017) 60 Cap 0   • sertraline (ZOLOFT) 50 MG Tab Take 1 Tab by mouth every day. (Patient not taking: Reported on 7/5/2017) 30 Tab 1   • triamcinolone acetonide (KENALOG) 0.1 % Cream Apply 2-4 g to affected area(s) 2 times a day. (Patient not taking: Reported on 7/5/2017) 1 Tube 0     No current facility-administered medications for this visit.        Patient is taking medications as noted in medication list.  Current supplements as per medication list.   Chronic narcotic pain medicines: no    Allergies: Review of patient's allergies indicates no known allergies.    Current social contact/activities: Pt keeps herself busy with family.      Is patient current with immunizations?  No, due for PREVNAR (PCV13) , TDAP and ZOSTAVAX (Shingles). Patient is interested in receiving NONE today.     She  reports that she quit smoking about 21 years ago. Her smoking use included Cigarettes.  She has a 50 pack-year smoking history. She has never used smokeless tobacco. She reports that she drinks alcohol. She reports that she does not use illicit drugs.  Counseling given: Not Answered        DPA/Advanced Directive:  Patient has Advanced Directive, but it is not on file. Instructed to bring in a copy to scan into their chart.    ROS:    Gait: Uses no assistive device   Ostomy: no   Other tubes: no  Amputations: no   Chronic oxygen use: no   Last eye exam: 11/2016   Wears hearing aids: no  : Denies incontinence.       Screening:    COPD  1. Is patient under the care of a pulmonologist? no      a. If yes, Care Teams was updated: No. Date of last visit: n/a     2. Has patient ever completed a PFT or spirometry? no       a. If yes, when? No     3.  If applicable, was CareTeam updated with oxygen/CPAP supplier? no    4. Has patient ever had instruction on inhaler technique by health care professional? Yes    5. Does patient have an action plan for when they have trouble breathing? yes    6. Is patient interested in a referral to respiratory therapy for more information on COPD, inhaler technique, and/or information on establishing an action plan?  no        Depression Screening    Little interest or pleasure in doing things?  0 - not at all  Feeling down, depressed, or hopeless?  0 - not at all  Patient Health Questionnaire Score: 0  If depressive symptoms identified deferred to follow up visit unless specifically addressed in assessment and plan.    Interpretation of PHQ-9 Total Score   Score Severity   1-4 Minimal Depression   5-9 Mild Depression   10-14 Moderate Depression   15-19 Moderately Severe Depression   20-27 Severe Depression    Screening for Cognitive Impairment    Three Minute Recall (banana, sunrise, fence)  1/3    Draw clock face with all 12 numbers set to the hand to show 10 minutes past 11 o'clock  1 5/5  If cognitive concerns identified deferred to follow up visit unless specifically  addressed in assessment and plan.    Fall Risk Assessment    Has the patient had two or more falls in the last year or any fall with injury in the last year?  No  If Fall Risk identified deferred to follow up visit unless specifically addressed in assessment and plan.    Safety Assessment    Throw rugs on floor.  No  Handrails on all stairs.  Yes  Good lighting in all hallways.  Yes  Difficulty hearing.  No  Patient counseled about all safety risks that were identified.    Functional Assessment ADLs    Are there any barriers preventing you from cooking for yourself or meeting nutritional needs?  No.    Are there any barriers preventing you from driving safely or obtaining transportation?  No.    Are there any barriers preventing you from using a telephone or calling for help?  No.    Are there any barriers preventing you from shopping?  No.    Are there any barriers preventing you from taking care of your own finances?  No.    Are there any barriers preventing you from managing your medications?  No.    Are currently engaging any exercise or physical activity?  Yes.  Pt stretches on a daily basis.     Health Maintenance Summary                Annual Wellness Visit Overdue 1940     PFT SCREENING-FEV1 AND FEV/FVC RATIO / SPIROMETRY SHOULD BE PERFORMED ANNUALLY Overdue 7/31/1957     COLONOSCOPY Overdue 1/29/2017      Previously completed 1/29/2007     MAMMOGRAM Overdue 6/30/2017      Done 6/30/2016 MA-SCREEN MAMMO W/CAD-BILAT    IMM PNEUMOCOCCAL 65+ (ADULT) LOW/MEDIUM RISK SERIES Postponed 12/28/2017 Originally 7/31/2004. Patient Refused    IMM DTaP/Tdap/Td Vaccine Postponed 12/28/2017 Originally 7/31/1958. Patient Refused    IMM ZOSTER VACCINE Postponed 12/28/2017 Originally 7/31/1999. Patient Refused    IMM INFLUENZA Next Due 9/1/2017      Done 11/17/2016 Imm Admin: Influenza Vaccine Adult HD     Patient has more history with this topic...    BONE DENSITY Next Due 6/30/2021      Done 6/30/2016 DS-BONE DENSITY  STUDY (DEXA)          Patient Care Team:  RAUDEL Cazares as PCP - General (Family Medicine)    Social History   Substance Use Topics   • Smoking status: Former Smoker -- 2.00 packs/day for 25 years     Types: Cigarettes     Quit date: 06/06/1996   • Smokeless tobacco: Never Used   • Alcohol Use: 0.0 oz/week     0 Standard drinks or equivalent per week      Comment: once a week     Family History   Problem Relation Age of Onset   • Cancer Father      lung, smoker   • Cancer Brother      kidney, liver   • Arthritis Sister      Rheumatoid Arthritis     She  has a past medical history of Heart burn; Dental disorder; Indigestion; Venous disease; Hypertension (4/1/2015); COPD (chronic obstructive pulmonary disease) (CMS-HCC); Myocardial infarct (CMS-HCC) (1996); Hiatus hernia syndrome; Anesthesia; Cancer (CMS-HCC) (2006); Emphysema of lung (CMS-HCC); Pneumonia (2007); and Shingles (2014).   Past Surgical History   Procedure Laterality Date   • Appendectomy  1957     appendectomy   • Other cardiac surgery  1996     angioplasty   • Other  2007     EGD to remove esophageal polyp   • Other  2010     matri stem   • Vein ligation radio frequency  2/22/2011     Performed by JB MIN at [facility]   • Carotid endarterectomy  11/8/2011     Performed by JB MIN at [facility]   • Cataract phaco with iol Left 11/1/2016     Procedure: CATARACT PHACO WITH IOL;  Surgeon: Gian Bruno M.D.;  [place];  Service:    • Cataract phaco with iol Right 11/15/2016     Procedure: CATARACT PHACO WITH IOL;  Surgeon: Gian Bruno M.D.;  [place];  Service:            Exam:     There were no vitals taken for this visit. There is no weight on file to calculate BMI.    Hearing good.    Dentition good  Alert, oriented in no acute distress.  Eye contact is good, speech goal directed, affect calm  Heart: RRR S1S2  Lungs: clear, equal with  good aeration    Assessment/ Plan  1. Medicare annual wellness visit, subsequent  Problem list and medications reviewed and updated today. General health and wellness discussion including healthy diet, regular exercise. Recommended vaccines reviewed, patient declines. She is scheduled next month for mammogram  2. Essential hypertension  Blood pressure stable, continue with current medication  3. Mucopurulent chronic bronchitis (CMS-HCC)  No recent difficulty  4. Anxiety  Continues having issues with anxiety. Had done well with budeprion in the past but for some reason did not respond well to buproprion. Will restart  - BUDEPRION  MG TABLET SR 12 HR sustained-release tablet; Take 1 Tab by mouth every day.  Dispense: 30 Tab; Refill: 2  5. History of MI (myocardial infarction)  No recent difficulty  - clopidogrel (PLAVIX) 75 MG Tab; Take 1 Tab by mouth every day.  Dispense: 90 Tab; Refill: 1  6. Environmental allergies  Well controlled with Flonase  - fluticasone (FLONASE) 50 MCG/ACT nasal spray; Spray 2 Sprays in nose every day.  Dispense: 1 Bottle; Refill: 0  7. Osteoporosis without current pathological fracture, unspecified osteoporosis type  Insurance reportedly will not cover appropriately. Will contact and find out if request is covered  8. Dyslipidemia  Well-controlled  9. Gastroesophageal reflux disease without esophagitis  Stable, continue omeprazole  10. Encounter for FIT (fecal immunochemical test) screening  - OCCULT BLOOD, FECAL IMMUNOASSAY      Services suggested: No services needed at this time  Health Care Screening recommendations as per orders if indicated.  Referrals offered: PT/OT/Nutrition counseling/Behavioral Health/Smoking cessation as per orders if indicated.    Discussion today about general wellness and lifestyle habits:    · Prevent falls and reduce trip hazards; Cautioned about securing or removing rugs.  · Have a working fire alarm and carbon monoxide detector;   · Engage in regular  physical activity and social activities       Follow-up: 6 months, sooner as needed

## 2017-07-05 NOTE — ASSESSMENT & PLAN NOTE
2006  Continues with daily aspirin, atorvastatin, plavix  No recent chest pain, activity intolerance

## 2017-07-05 NOTE — MR AVS SNAPSHOT
"        Kelly Bello   2017 8:00 AM   Office Visit   MRN: [id number]    Department:  [facility]   Dept Phone:  [phone]    Description:  Female : 1940   Provider:  RAUDEL Cazares; [facility]            Reason for Visit     Annual Wellness Visit           Allergies as of 2017     No Known Allergies      You were diagnosed with     Essential hypertension   [3718266]       Mucopurulent chronic bronchitis (CMS-[facility])   [491.1.ICD-9-CM]       Anxiety   [528392]       Sinusitis, unspecified chronicity, unspecified location   [2953827]       Encounter for FIT (fecal immunochemical test) screening   [0693712]       History of MI (myocardial infarction)   [126396]       Environmental allergies   [812943]         Vital Signs     Blood Pressure Pulse Temperature Height Weight Body Mass Index    130/62 mmHg 67 36.1 °C (97 °F) 1.651 m (5' 5\") 80.74 kg (178 lb) 29.62 kg/m2    Oxygen Saturation Smoking Status                93% Former Smoker          Basic Information     Date Of Birth Sex Race Ethnicity Preferred Language    1940 Female White Non- English      Your appointments     2017 10:00 AM   CARE MANAGEMENT OUTREACH with Tamika Martinez R.N.   Population Health (--)    [address] [facility] [address] [facility] [address] [phone]           ***IMPORTANT**** This is a phone visit only. Do not come into the office. The Care Manager will call you at the scheduled time for Care Manager Telephone Visit.            Aug 04, 2017  7:20 AM   MA SCRN10 with [facility] MG 1   [facility] ([address])    [address]  [place] [address] [phone]           No deodorant, powder, perfume or lotion under the arm or breast area.              Problem List              ICD-10-CM Priority Class Noted - Resolved    History of MI (myocardial infarction) I25.2   2015 - Present    COPD (chronic obstructive pulmonary disease) (CMS-[facility]) J44.9  "  1/29/2015 - Present    Decreased GFR R94.4   1/29/2015 - Present    Hypertension I10   4/1/2015 - Present    Bell palsy G51.0   5/14/2015 - Present    Raynaud's disease I73.00   1/15/2016 - Present    Hair thinning L65.9   1/15/2016 - Present    Chronic fatigue R53.82   1/15/2016 - Present    Anxiety F41.9   6/9/2016 - Present    Gastroesophageal reflux disease without esophagitis K21.9   6/9/2016 - Present    Dyslipidemia E78.5   1/4/2017 - Present    Osteoporosis M81.0   1/4/2017 - Present      Health Maintenance        Date Due Completion Dates    COLONOSCOPY 1/29/2017 1/29/2007 (Prv Comp)    Override on 1/29/2007: Previously completed    MAMMOGRAM 6/30/2017 6/30/2016    IMM PNEUMOCOCCAL 65+ (ADULT) LOW/MEDIUM RISK SERIES (1 of 2 - PCV13) 12/28/2017 (Originally 7/31/2004) ---    IMM DTaP/Tdap/Td Vaccine (1 - Tdap) 12/28/2017 (Originally 7/31/1958) ---    IMM ZOSTER VACCINE 12/28/2017 (Originally 7/31/1999) ---    IMM INFLUENZA (1) 9/1/2017 11/17/2016, 11/19/2015    BONE DENSITY 6/30/2021 6/30/2016            Current Immunizations     Influenza Vaccine Adult HD 11/17/2016    Influenza Vaccine Quad Inj (Pf) 11/19/2015      Below and/or attached are the medications your provider expects you to take. Review all of your home medications and newly ordered medications with your provider and/or pharmacist. Follow medication instructions as directed by your provider and/or pharmacist. Please keep your medication list with you and share with your provider. Update the information when medications are discontinued, doses are changed, or new medications (including over-the-counter products) are added; and carry medication information at all times in the event of emergency situations     Allergies:  No Known Allergies          Medications  Valid as of: July 05, 2017 -  9:52 AM    Generic Name Brand Name Tablet Size Instructions for use    Albuterol Sulfate (Aero Soln) albuterol 108 (90 BASE) MCG/ACT Inhale 2 Puffs by mouth  every 6 hours as needed for Shortness of Breath.        Aspirin (Tab)  MG Take 325 mg by mouth every 6 hours as needed.        Atorvastatin Calcium (Tab) LIPITOR 20 MG TAKE 1 TAB BY MOUTH EVERY DAY.        BuPROPion HCl (TABLET SR 12 HR) BUDEPRION  MG Take 1 Tab by mouth every day.        Clopidogrel Bisulfate (Tab) PLAVIX 75 MG Take 1 Tab by mouth every day.        Ferrous Sulfate (Tab) Iron 325 (65 FE) MG Take 1 Tab by mouth 2 times a day, with meals.        Fluticasone Propionate (Suspension) FLONASE 50 MCG/ACT Spray 2 Sprays in nose every day.        HydroCHLOROthiazide (Tab) HYDRODIURIL 25 MG TAKE 1 TAB BY MOUTH EVERY DAY.        Naproxen Sodium   Take 1 Tab by mouth as needed.        Omeprazole (CAPSULE DELAYED RELEASE) PRILOSEC 20 MG TAKE 1 CAPSULE EVERY DAY        .                 Medicines prescribed today were sent to:     [id number] - [place], [place] - [address]    [address] [facility] [address]    Phone: [phone] Fax: [phone]    Open 24 Hours?: No      Medication refill instructions:       If your prescription bottle indicates you have medication refills left, it is not necessary to call your provider’s office. Please contact your pharmacy and they will refill your medication.    If your prescription bottle indicates you do not have any refills left, you may request refills at any time through one of the following ways: The online [employer] system (except Urgent Care), by calling your provider’s office, or by asking your pharmacy to contact your provider’s office with a refill request. Medication refills are processed only during regular business hours and may not be available until the next business day. Your provider may request additional information or to have a follow-up visit with you prior to refilling your medication.   *Please Note: Medication refills are assigned a new Rx number when refilled electronically. Your pharmacy may indicate that no refills were  authorized even though a new prescription for the same medication is available at the pharmacy. Please request the medicine by name with the pharmacy before contacting your provider for a refill.           Encap Access Code: [email or web address]  Expires: 8/4/2017  3:58 AM    Encap  A secure, online tool to manage your health information     [employer]’s Encap® is a secure, online tool that connects you to your personalized health information from the privacy of your home -- day or night - making it very easy for you to manage your healthcare. Once the activation process is completed, you can even access your medical information using the Encap faith, which is available for free in the Apple Faith store or Google Play store.     Encap provides the following levels of access (as shown below):   My Chart Features   [facility] Doctor [facility]  Specialists [facility]  Urgent  Care Non-[facility]  Primary Care  Doctor   Email your healthcare team securely and privately 24/7 X X X    Manage appointments: schedule your next appointment; view details of past/upcoming appointments X      Request prescription refills. X      View recent personal medical records, including lab and immunizations X X X X   View health record, including health history, allergies, medications X X X X   Read reports about your outpatient visits, procedures, consult and ER notes X X X X   See your discharge summary, which is a recap of your hospital and/or ER visit that includes your diagnosis, lab results, and care plan. X X       How to register for Encap:  1. Go to  [email or web address].  2. Click on the Sign Up Now box, which takes you to the New Member Sign Up page. You will need to provide the following information:  a. Enter your Encap Access Code exactly as it appears at the top of this page. (You will not need to use this code after you’ve completed the sign-up process. If you do not sign up before the expiration date, you  must request a new code.)   b. Enter your date of birth.   c. Enter your home email address.   d. Click Submit, and follow the next screen’s instructions.  3. Create a [employer] ID. This will be your [employer] login ID and cannot be changed, so think of one that is secure and easy to remember.  4. Create a [employer] password. You can change your password at any time.  5. Enter your Password Reset Question and Answer. This can be used at a later time if you forget your password.   6. Enter your e-mail address. This allows you to receive e-mail notifications when new information is available in [employer].  7. Click Sign Up. You can now view your health information.    For assistance activating your [employer] account, call [phone]

## 2017-07-05 NOTE — ASSESSMENT & PLAN NOTE
Failed paxil, celexa, wellbutrin, zoloft  Had done well in the past with budeprion but for some reason did not tolerate the  buproprion as well  Still having intermittent difficulty with anxiety  Denies depression, panic attack, appetite change

## 2017-07-05 NOTE — TELEPHONE ENCOUNTER
Per pt insurance will not cover prolia. Please contact company and find out if reclast is covered  thanks

## 2017-07-05 NOTE — TELEPHONE ENCOUNTER
Per [facility] Prolia is covered at 80%. Pt is responsible for 20%. [facility] is stating that all you need to do is order it and the infusion center will call for authorization.

## 2017-07-05 NOTE — ASSESSMENT & PLAN NOTE
SE with fosamax  Was referred for prolia but told it was not covered by insurance  No history of fracture

## 2017-07-05 NOTE — ASSESSMENT & PLAN NOTE
Continues to be well controlled with atorvastatin  Tolerating medication without side effect including myalgia

## 2017-07-07 ENCOUNTER — TELEPHONE (OUTPATIENT)
Dept: MEDICAL GROUP | Facility: MEDICAL CENTER | Age: 77
End: 2017-07-07

## 2017-07-07 NOTE — TELEPHONE ENCOUNTER
Please attempt to call patient and let her know that prolia is covered but she would have a 20% deductible. I do not know what the total cost for her would be, she may be able to find out by calling her insurance company. Would she like to proceed with infusion?

## 2017-07-07 NOTE — TELEPHONE ENCOUNTER
Spoke with pt   Spoke with pt informed her of your previous message, she states she will pay for the deductible however it may be a couple months she states she will inform us when shes ready to begin prolia and infusion

## 2017-07-07 NOTE — TELEPHONE ENCOUNTER
Spoke with pt informed her of your previous message, she states she will pay for the deductible however it may be a couple months she states she will inform us when shes ready to begin prolia and infusion

## 2017-07-11 ENCOUNTER — PATIENT OUTREACH (OUTPATIENT)
Dept: HEALTH INFORMATION MANAGEMENT | Facility: OTHER | Age: 77
End: 2017-07-11

## 2017-07-11 NOTE — PROGRESS NOTES
CC outreach call.  Referral received from outreach team, high utilization report.  CC left  requesting return call to CC at [phone].

## 2017-07-14 DIAGNOSIS — F41.9 ANXIETY: ICD-10-CM

## 2017-07-14 NOTE — TELEPHONE ENCOUNTER
Was the patient seen in the last year in this department? Yes     Does patient have an active prescription for medications requested? No     Received Request Via: Patient     Pt called in and states [facility] in [place] is the only pharmacy able to receive this medication. Pt asking to have this sent to them and Rx must have Pt's phone number on it so pharmacy can contact pt as she has not filled there before. Ph: [phone]

## 2017-09-13 DIAGNOSIS — L30.9 DERMATITIS: ICD-10-CM

## 2017-09-13 RX ORDER — TRIAMCINOLONE ACETONIDE 1 MG/G
CREAM TOPICAL
Qty: 30 G | Refills: 0 | Status: SHIPPED | OUTPATIENT
Start: 2017-09-13 | End: 2019-04-02 | Stop reason: SDUPTHER

## 2017-10-01 DIAGNOSIS — I10 ESSENTIAL HYPERTENSION: ICD-10-CM

## 2017-10-02 RX ORDER — HYDROCHLOROTHIAZIDE 25 MG/1
25 TABLET ORAL DAILY
Qty: 90 TAB | Refills: 1 | Status: SHIPPED | OUTPATIENT
Start: 2017-10-02 | End: 2017-12-11

## 2017-10-05 DIAGNOSIS — J41.1 MUCOPURULENT CHRONIC BRONCHITIS (HCC): ICD-10-CM

## 2017-10-05 DIAGNOSIS — I10 ESSENTIAL HYPERTENSION: ICD-10-CM

## 2017-10-05 RX ORDER — HYDROCHLOROTHIAZIDE 25 MG/1
25 TABLET ORAL DAILY
Qty: 90 TAB | Refills: 1 | Status: SHIPPED | OUTPATIENT
Start: 2017-10-05 | End: 2018-11-02 | Stop reason: SDUPTHER

## 2017-10-05 NOTE — TELEPHONE ENCOUNTER
Was the patient seen in the last year in this department? Yes     Does patient have an active prescription for medications requested? No     Received Request Via: Pharmacy     Last seen: 07/05/2017 Blaze

## 2017-10-11 DIAGNOSIS — Z91.09 ENVIRONMENTAL ALLERGIES: ICD-10-CM

## 2017-10-11 RX ORDER — FLUTICASONE PROPIONATE 50 MCG
SPRAY, SUSPENSION (ML) NASAL
Qty: 16 G | Refills: 1 | Status: SHIPPED | OUTPATIENT
Start: 2017-10-11 | End: 2018-04-02 | Stop reason: SDUPTHER

## 2017-11-25 ENCOUNTER — OFFICE VISIT (OUTPATIENT)
Dept: URGENT CARE | Facility: PHYSICIAN GROUP | Age: 77
End: 2017-11-25
Payer: MEDICARE

## 2017-11-25 VITALS
TEMPERATURE: 97 F | DIASTOLIC BLOOD PRESSURE: 70 MMHG | RESPIRATION RATE: 14 BRPM | OXYGEN SATURATION: 94 % | WEIGHT: 164 LBS | SYSTOLIC BLOOD PRESSURE: 120 MMHG | BODY MASS INDEX: 26.36 KG/M2 | HEART RATE: 69 BPM | HEIGHT: 66 IN

## 2017-11-25 DIAGNOSIS — J02.9 SORE THROAT: ICD-10-CM

## 2017-11-25 DIAGNOSIS — J43.8 OTHER EMPHYSEMA (HCC): ICD-10-CM

## 2017-11-25 DIAGNOSIS — J00 ACUTE NASOPHARYNGITIS (COMMON COLD): ICD-10-CM

## 2017-11-25 DIAGNOSIS — K21.9 GASTROESOPHAGEAL REFLUX DISEASE WITHOUT ESOPHAGITIS: ICD-10-CM

## 2017-11-25 LAB
INT CON NEG: NEGATIVE
INT CON POS: POSITIVE
S PYO AG THROAT QL: NEGATIVE

## 2017-11-25 PROCEDURE — 87880 STREP A ASSAY W/OPTIC: CPT | Performed by: NURSE PRACTITIONER

## 2017-11-25 PROCEDURE — 99214 OFFICE O/P EST MOD 30 MIN: CPT | Performed by: NURSE PRACTITIONER

## 2017-11-25 RX ORDER — AMOXICILLIN AND CLAVULANATE POTASSIUM 875; 125 MG/1; MG/1
1 TABLET, FILM COATED ORAL 2 TIMES DAILY
Qty: 14 TAB | Refills: 0 | Status: SHIPPED | OUTPATIENT
Start: 2017-11-25 | End: 2017-12-11

## 2017-11-25 RX ORDER — OMEPRAZOLE 20 MG/1
20 CAPSULE, DELAYED RELEASE ORAL DAILY
Qty: 30 CAP | Refills: 1 | Status: SHIPPED | OUTPATIENT
Start: 2017-11-25 | End: 2019-10-15

## 2017-11-25 ASSESSMENT — ENCOUNTER SYMPTOMS
MYALGIAS: 0
CHILLS: 0
SPUTUM PRODUCTION: 1
NAUSEA: 0
SINUS PAIN: 1
EYE DISCHARGE: 0
FEVER: 0
COUGH: 1
WHEEZING: 0
ORTHOPNEA: 0
SORE THROAT: 1
SHORTNESS OF BREATH: 0
DIARRHEA: 0
HEADACHES: 1

## 2017-11-25 NOTE — PROGRESS NOTES
Subjective:      Kelly Bello is a 77 y.o. female who presents with Sinus Problem (poss sinus/sore throat x1 day )            HPI This is a new problem. 77 year old female with sinus congestion/pain (right side), sore throat and cough. Denies fever, chills, myalgia, shortness of breath or wheezing. She has no nausea or vomiting. She has not taken any medication for these symptoms. History relevant for COPD. She would also like refill of prilosec.  Patient has no known allergies.  Current Outpatient Prescriptions on File Prior to Visit   Medication Sig Dispense Refill   • fluticasone (FLONASE) 50 MCG/ACT nasal spray USE 2 SPRAYS IN NOSE EVERY DAY. 16 g 1   • PROAIR  (90 Base) MCG/ACT Aero Soln inhalation aerosol INHALE 2 PUFFS BY MOUTH EVERY 6 HOURS AS NEEDED FOR SHORTNESS OF BREATH. 8.5 Inhaler 5   • hydrochlorothiazide (HYDRODIURIL) 25 MG Tab TAKE 1 TAB BY MOUTH EVERY DAY. 90 Tab 1   • triamcinolone acetonide (KENALOG) 0.1 % Cream APPLY 2 TO 4 GRAMS TO AFFECTED AREA(S) 2 TIMES A DAY. 30 g 0   • aspirin (ASA) 325 MG Tab Take 325 mg by mouth every 6 hours as needed.     • clopidogrel (PLAVIX) 75 MG Tab Take 1 Tab by mouth every day. 90 Tab 1   • omeprazole (PRILOSEC) 20 MG delayed-release capsule TAKE 1 CAPSULE EVERY DAY 90 Cap 1   • atorvastatin (LIPITOR) 20 MG Tab TAKE 1 TAB BY MOUTH EVERY DAY. 90 Tab 1   • Ferrous Sulfate (IRON) 325 (65 FE) MG TABS Take 1 Tab by mouth 2 times a day, with meals.     • hydrochlorothiazide (HYDRODIURIL) 25 MG Tab TAKE 1 TAB BY MOUTH EVERY DAY. 90 Tab 1   • BUDEPRION  MG TABLET SR 12 HR sustained-release tablet Take 1 Tab by mouth every day. 30 Tab 2   • hydrochlorothiazide (HYDRODIURIL) 25 MG Tab TAKE 1 TAB BY MOUTH EVERY DAY. 30 Tab 5   • albuterol (VENTOLIN OR PROVENTIL) 108 (90 BASE) MCG/ACT Aero Soln inhalation aerosol Inhale 2 Puffs by mouth every 6 hours as needed for Shortness of Breath. 8.5 g 3   • Naproxen Sodium (ALEVE PO) Take 1 Tab by mouth as needed.   "     No current facility-administered medications on file prior to visit.      Social History     Social History   • Marital status: Single     Spouse name: N/A   • Number of children: N/A   • Years of education: N/A     Occupational History   • Not on file.     Social History Main Topics   • Smoking status: Former Smoker     Packs/day: 2.00     Years: 25.00     Types: Cigarettes     Quit date: 6/6/1996   • Smokeless tobacco: Never Used   • Alcohol use 0.0 oz/week      Comment: once a week   • Drug use: No   • Sexual activity: No     Other Topics Concern   • Not on file     Social History Narrative    Works at [employer], lives in [place], , lives alone with daughters nearby     family history includes Arthritis in her sister; Cancer in her brother and father.      Review of Systems   Constitutional: Positive for malaise/fatigue. Negative for chills and fever.   HENT: Positive for congestion, sinus pain and sore throat.    Eyes: Negative for discharge.   Respiratory: Positive for cough and sputum production. Negative for shortness of breath and wheezing.    Cardiovascular: Negative for chest pain and orthopnea.   Gastrointestinal: Negative for diarrhea and nausea.   Musculoskeletal: Negative for myalgias.   Neurological: Positive for headaches.   Endo/Heme/Allergies: Negative for environmental allergies.          Objective:     /70   Pulse 69   Temp 36.1 °C (97 °F)   Resp 14   Ht 1.676 m (5' 6\")   Wt 74.4 kg (164 lb)   SpO2 94%   BMI 26.47 kg/m²      Physical Exam   Constitutional: She is oriented to person, place, and time. She appears well-developed and well-nourished. No distress.   HENT:   Head: Normocephalic and atraumatic.   Right Ear: External ear and ear canal normal. Tympanic membrane is not injected and not perforated. No middle ear effusion.   Left Ear: External ear and ear canal normal. Tympanic membrane is not injected and not perforated.  No middle ear effusion.   Nose: Mucosal " edema present.   Mouth/Throat: Posterior oropharyngeal erythema present. No oropharyngeal exudate.   Eyes: Conjunctivae are normal. Right eye exhibits no discharge. Left eye exhibits no discharge.   Neck: Normal range of motion. Neck supple.   Cardiovascular: Normal rate, regular rhythm and normal heart sounds.    No murmur heard.  Pulmonary/Chest: Effort normal and breath sounds normal. No respiratory distress. She has no wheezes. She has no rales.   Musculoskeletal: Normal range of motion.   Normal movement of all 4 extremities.   Lymphadenopathy:     She has no cervical adenopathy.        Right: No supraclavicular adenopathy present.        Left: No supraclavicular adenopathy present.   Neurological: She is alert and oriented to person, place, and time. Gait normal.   Skin: Skin is warm and dry.   Psychiatric: She has a normal mood and affect. Her behavior is normal. Thought content normal.   Nursing note and vitals reviewed.              Assessment/Plan:     1. Sore throat  POCT Rapid Strep A   2. Acute nasopharyngitis (common cold)  amoxicillin-clavulanate (AUGMENTIN) 875-125 MG Tab   3. Other emphysema (CMS-HCC)     4. Gastroesophageal reflux disease without esophagitis  omeprazole (PRILOSEC) 20 MG delayed-release capsule     Strep negative.  augmentin as her symptoms are unilateral which can point to bacterial etiology versus viral  Refill on prilosec.  Declined offer for flu shot on this visit.  Differential diagnosis, natural history, supportive care, and indications for immediate follow-up discussed at length.

## 2017-12-07 RX ORDER — ATORVASTATIN CALCIUM 20 MG/1
TABLET, FILM COATED ORAL
Qty: 30 TAB | Refills: 0 | Status: SHIPPED | OUTPATIENT
Start: 2017-12-07 | End: 2018-01-08 | Stop reason: SDUPTHER

## 2017-12-07 NOTE — TELEPHONE ENCOUNTER
Was the patient seen in the last year in this department? Yes     Does patient have an active prescription for medications requested? No     Received Request Via: Pharmacy     Last seen: 07/05/2017

## 2017-12-11 ENCOUNTER — OFFICE VISIT (OUTPATIENT)
Dept: MEDICAL GROUP | Facility: MEDICAL CENTER | Age: 77
End: 2017-12-11
Payer: MEDICARE

## 2017-12-11 VITALS
WEIGHT: 168.4 LBS | HEIGHT: 66 IN | HEART RATE: 82 BPM | OXYGEN SATURATION: 93 % | DIASTOLIC BLOOD PRESSURE: 54 MMHG | TEMPERATURE: 97 F | BODY MASS INDEX: 27.06 KG/M2 | SYSTOLIC BLOOD PRESSURE: 126 MMHG

## 2017-12-11 DIAGNOSIS — M15.9 PRIMARY OSTEOARTHRITIS INVOLVING MULTIPLE JOINTS: ICD-10-CM

## 2017-12-11 DIAGNOSIS — J01.00 ACUTE NON-RECURRENT MAXILLARY SINUSITIS: ICD-10-CM

## 2017-12-11 DIAGNOSIS — H92.01 RIGHT EAR PAIN: ICD-10-CM

## 2017-12-11 DIAGNOSIS — J41.1 MUCOPURULENT CHRONIC BRONCHITIS (HCC): ICD-10-CM

## 2017-12-11 PROCEDURE — 99214 OFFICE O/P EST MOD 30 MIN: CPT | Performed by: NURSE PRACTITIONER

## 2017-12-11 NOTE — ASSESSMENT & PLAN NOTE
Feeling more sob lately  Using albuterol about once daily  She does have chronic cough, clear sputum  No recent fever  She does have history of smoking, quit in 1996  No prior PFT

## 2017-12-11 NOTE — ASSESSMENT & PLAN NOTE
On and off for several months  Seen in urgent care recently and treated for sinusitis, did feel that the ear improved for a few days when on antibiotics  However, discomfort returned shortly thereafter  Today ear is not acutely painful but aching  She has had difficulty with allergies, is using flonase

## 2017-12-12 PROBLEM — M15.0 PRIMARY OSTEOARTHRITIS INVOLVING MULTIPLE JOINTS: Status: ACTIVE | Noted: 2017-12-12

## 2017-12-12 PROBLEM — M15.9 PRIMARY OSTEOARTHRITIS INVOLVING MULTIPLE JOINTS: Status: ACTIVE | Noted: 2017-12-12

## 2017-12-12 RX ORDER — DOXYCYCLINE HYCLATE 100 MG
100 TABLET ORAL 2 TIMES DAILY
Qty: 24 TAB | Refills: 0 | Status: SHIPPED | OUTPATIENT
Start: 2017-12-12 | End: 2017-12-27

## 2017-12-12 RX ORDER — FLUTICASONE PROPIONATE 110 UG/1
2 AEROSOL, METERED RESPIRATORY (INHALATION) 2 TIMES DAILY
Qty: 1 INHALER | Refills: 3 | Status: SHIPPED | OUTPATIENT
Start: 2017-12-12 | End: 2018-12-31 | Stop reason: SDUPTHER

## 2017-12-12 NOTE — ASSESSMENT & PLAN NOTE
Multiple joint pain recently worsening bilateral hips. Exacerbated by long periods of sitting, walking, or standing

## 2017-12-12 NOTE — PROGRESS NOTES
Subjective:     Chief Complaint   Patient presents with   • Vertigo   • Hip Pain     Kelly Bello is a 77 y.o. female here today to follow up on:    Acute sinusitis, Right ear pain  Seen in urgent care recently and treated for sinusitis, felt that her symptoms improved for a few days when on antibiotics but returned shortly thereafter  Continues having right-sided facial pain, and in the teeth, sinus pressure. Ongoing difficulty with right ear pain-this has been an issue for the last several months preceding the sinus infection  Today ear is not acutely painful but aching  She has had difficulty with allergies, is using flonase  Some difficulty with her hearing but no recent change  No discharge from the ear, fever, cough, headache, nausea  COPD (chronic obstructive pulmonary disease)  Feeling more sob lately, has to stop and rest if walking a long distance  Using albuterol about once daily  She does have chronic cough, clear sputum  No recent fever  She does have history of smoking, quit in 1996  No prior PFT  No chest pain, swelling in lower extremities  Primary osteoarthritis involving multiple joints  Multiple joint pain, recently worsen in bilateral hips. Exacerbated by long periods of sitting, walking, or standing  Using a cane at times. Concerned about upcoming winter and having to navigate ice or snow. Requesting handicap placard       Current medicines (including changes today)  Current Outpatient Prescriptions   Medication Sig Dispense Refill   • doxycycline (VIBRAMYCIN) 100 MG Tab Take 1 Tab by mouth 2 times a day. 24 Tab 0   • fluticasone (FLOVENT HFA) 110 MCG/ACT Aerosol Inhale 2 Puffs by mouth 2 times a day. 1 Inhaler 3   • atorvastatin (LIPITOR) 20 MG Tab TAKE 1 TAB BY MOUTH EVERY DAY. 30 Tab 0   • fluticasone (FLONASE) 50 MCG/ACT nasal spray USE 2 SPRAYS IN NOSE EVERY DAY. 16 g 1   • PROAIR  (90 Base) MCG/ACT Aero Soln inhalation aerosol INHALE 2 PUFFS BY MOUTH EVERY 6 HOURS AS NEEDED  "FOR SHORTNESS OF BREATH. 8.5 Inhaler 5   • triamcinolone acetonide (KENALOG) 0.1 % Cream APPLY 2 TO 4 GRAMS TO AFFECTED AREA(S) 2 TIMES A DAY. 30 g 0   • clopidogrel (PLAVIX) 75 MG Tab Take 1 Tab by mouth every day. 90 Tab 1   • omeprazole (PRILOSEC) 20 MG delayed-release capsule TAKE 1 CAPSULE EVERY DAY 90 Cap 1   • albuterol (VENTOLIN OR PROVENTIL) 108 (90 BASE) MCG/ACT Aero Soln inhalation aerosol Inhale 2 Puffs by mouth every 6 hours as needed for Shortness of Breath. 8.5 g 3   • omeprazole (PRILOSEC) 20 MG delayed-release capsule Take 1 Cap by mouth every day. 30 Cap 1   • hydrochlorothiazide (HYDRODIURIL) 25 MG Tab TAKE 1 TAB BY MOUTH EVERY DAY. 90 Tab 1   • BUDEPRION  MG TABLET SR 12 HR sustained-release tablet Take 1 Tab by mouth every day. 30 Tab 2   • aspirin (ASA) 325 MG Tab Take 325 mg by mouth every 6 hours as needed.     • Ferrous Sulfate (IRON) 325 (65 FE) MG TABS Take 1 Tab by mouth 2 times a day, with meals.     • Naproxen Sodium (ALEVE PO) Take 1 Tab by mouth as needed.       No current facility-administered medications for this visit.      She  has a past medical history of Anesthesia; Cancer (CMS-HCC) (2006); COPD (chronic obstructive pulmonary disease) (CMS-HCC); Dental disorder; Emphysema of lung (CMS-HCC); Heart burn; Hiatus hernia syndrome; Hypertension (4/1/2015); Indigestion; Myocardial infarct (1996); Pneumonia (2007); Shingles (2014); and Venous disease.    ROS included above     Objective:     Blood pressure 126/54, pulse 82, temperature 36.1 °C (97 °F), height 1.676 m (5' 6\"), weight 76.4 kg (168 lb 6.4 oz), SpO2 93 %, not currently breastfeeding. Body mass index is 27.18 kg/m².     Physical Exam:  General: Alert, oriented in no acute distress.  Eye contact is good, speech is normal, affect calm  HEENT: Oral mucosa pink moist, no lesions, No exudate. TMs gray with good landmarks bilaterally, no redness or effusion. Nares with yellow mucus. Tenderness over the right maxillary " sinus. No cervical or supraclavicular lymphadenopathy.  Lungs: clear to auscultation bilaterally, normal effort, no wheeze/ rhonchi/ rales.  CV: regular rate and rhythm, S1, S2, no murmur  Abdomen: soft, nontender  MS: Tenderness over bilateral SI joint, no point tenderness over the spine  Ext: no edema, color normal, vascularity normal, temperature normal    Assessment and Plan:   The following treatment plan was discussed   1. Right ear pain  Right ear discomfort for the last several months, preceding sinusitis. No visible abnormality on exam, will refer to ENT for evaluation in the event that this does not resolve with retreatment of her sinus infection  REFERRAL TO ENT   2. Acute non-recurrent maxillary sinusitis  Treated recently with Augmentin, improved for a few days while on antibiotics and symptoms promptly returned. Will retreat with doxycycline, encouraged to continue with Flonase, ibuprofen if needed. She will let me now she is feeling next week  doxycycline (VIBRAMYCIN) 100 MG Tab   3. Mucopurulent chronic bronchitis (CMS-HCC)  Increased need for albuterol and shortness of breath with exertion recently. Start Flovent, obtain PFT, continue albuterol as needed. Follow-up if not improving  PULMONARY FUNCTION TESTS Test requested: Complete Pulmonary Function Test    fluticasone (FLOVENT HFA) 110 MCG/ACT Aerosol   4. Primary osteoarthritis involving multiple joints  Increased difficulty with multiple joint pain, most notable in bilateral hips. She does feel somewhat unstable at times and is using a cane. Form for handicap placard completed        Followup: One week if no improvement with above plan         Please note that this dictation was created using voice recognition software. I have worked with consultants from the vendor as well as technical experts from [employer] to optimize the interface. I have made every reasonable attempt to correct obvious errors, but I expect that there are errors of  grammar and possibly content that I did not discover before finalizing the note.

## 2017-12-27 ENCOUNTER — TELEPHONE (OUTPATIENT)
Dept: MEDICAL GROUP | Facility: MEDICAL CENTER | Age: 77
End: 2017-12-27

## 2017-12-27 RX ORDER — AMOXICILLIN AND CLAVULANATE POTASSIUM 875; 125 MG/1; MG/1
1 TABLET, FILM COATED ORAL 2 TIMES DAILY
Qty: 20 TAB | Refills: 0 | Status: SHIPPED | OUTPATIENT
Start: 2017-12-27 | End: 2018-07-11

## 2017-12-27 NOTE — TELEPHONE ENCOUNTER
Please inform pt I have sent prescription for Augmentin. Please schedule appt if not improving in the next week or so

## 2017-12-27 NOTE — TELEPHONE ENCOUNTER
1. Caller Name: Kelly Bello                                         Call Back Number: [phone]      Patient approves a detailed voicemail message: yes    Pt called and reports she had a reaction to the doxycycline  Causing nausea and vertigo. Pt states the only antibiotic that has really ever helped her is Augmentin. Pt's chart updated with reaction as well. Pt would like to know if you could send a Rx to her Pharmacy [facility].

## 2017-12-27 NOTE — TELEPHONE ENCOUNTER
Phone Number Called: [phone] (home)     Message: Informed pt via detailed voicemail message. Stated to call back with any questions at [phone].     Left Message for patient to call back: yes

## 2018-01-08 RX ORDER — ATORVASTATIN CALCIUM 20 MG/1
TABLET, FILM COATED ORAL
Qty: 30 TAB | Refills: 11 | Status: SHIPPED | OUTPATIENT
Start: 2018-01-08 | End: 2019-01-28 | Stop reason: SDUPTHER

## 2018-01-08 NOTE — TELEPHONE ENCOUNTER
ATORVASTATIN 20 MG TABLET    Was the patient seen in the last year in this department? Yes     Does patient have an active prescription for medications requested? No     Received Request Via: Pharmacy

## 2018-01-09 DIAGNOSIS — I25.2 HISTORY OF MI (MYOCARDIAL INFARCTION): ICD-10-CM

## 2018-01-09 RX ORDER — CLOPIDOGREL BISULFATE 75 MG/1
75 TABLET ORAL
Qty: 90 TAB | Refills: 0 | Status: SHIPPED | OUTPATIENT
Start: 2018-01-09 | End: 2018-04-29 | Stop reason: SDUPTHER

## 2018-01-09 NOTE — TELEPHONE ENCOUNTER
Was the patient seen in the last year in this department? Yes     Does patient have an active prescription for medications requested? Yes     Received Request Via: Pharmacy

## 2018-01-10 ENCOUNTER — HOSPITAL ENCOUNTER (OUTPATIENT)
Dept: OTHER | Facility: MEDICAL CENTER | Age: 78
End: 2018-01-10
Attending: NURSE PRACTITIONER
Payer: MEDICARE

## 2018-01-10 PROCEDURE — 94729 DIFFUSING CAPACITY: CPT | Mod: 26 | Performed by: INTERNAL MEDICINE

## 2018-01-10 PROCEDURE — 94726 PLETHYSMOGRAPHY LUNG VOLUMES: CPT | Mod: 26 | Performed by: INTERNAL MEDICINE

## 2018-01-10 PROCEDURE — 94726 PLETHYSMOGRAPHY LUNG VOLUMES: CPT

## 2018-01-10 PROCEDURE — 94729 DIFFUSING CAPACITY: CPT

## 2018-01-10 PROCEDURE — 94060 EVALUATION OF WHEEZING: CPT

## 2018-01-10 PROCEDURE — 94060 EVALUATION OF WHEEZING: CPT | Mod: 26 | Performed by: INTERNAL MEDICINE

## 2018-01-10 ASSESSMENT — PULMONARY FUNCTION TESTS
FEV1/FVC_PERCENT_LLN: 64
FEV1: 1.71
FEV1/FVC_PERCENT_CHANGE: 0
FEV1_PERCENT_CHANGE: -2
FEV1/FVC_PERCENT_PREDICTED: 105
FVC: 2.2
FEV1_PERCENT_CHANGE: 0
FEV1/FVC: 79.91
FEV1/FVC: 78
FEV1_LLN: 1.75
FVC_PERCENT_PREDICTED: 77
FEV1: 1.71
FVC_PREDICTED: 2.77
FEV1/FVC_PERCENT_PREDICTED: 100
FVC: 2.14
FEV1_PERCENT_PREDICTED: 81
FVC_LLN: 2.31
FEV1/FVC_PERCENT_CHANGE: 3
FVC_PERCENT_PREDICTED: 79
FEV1/FVC_PREDICTED: 77
FEV1/FVC: 80
FEV1/FVC_PERCENT_LLN: 64
FEV1/FVC_PERCENT_PREDICTED: 103
FEV1/FVC: 78
FEV1/FVC_PERCENT_PREDICTED: 76
FEV1/FVC_PERCENT_PREDICTED: 103
FEV1_PREDICTED: 2.1
FVC_LLN: 2.31
FEV1_PERCENT_PREDICTED: 81
FEV1_LLN: 1.75

## 2018-01-12 NOTE — PROCEDURES
DATE OF STUDY:  01/10/2018    INTERPRETATION:  1.  FEV1 is 1.71 liters, which is 81% of predicted.  The FEV1/FVC ratio is   normal at 78%.  MVV is reduced at 62% of predicted.  2.  There is no response to an inhaled bronchodilator.  3.  Total lung capacity is 86% of predicted.  4.  Gas exchange as estimated by DLCO is reduced at 67% of predicted.  5.  Airway resistance is normal.    IMPRESSION:  No definite evidence of obstructive disease is noted.  There is   no evidence of hyperinflation.  The patient does have a reduction in DLCO,   which could be secondary to emphysema, pulmonary fibrosis, or pulmonary   vascular disease.  Clinical correlation would be necessary for complete   interpretation.       ____________________________________     MD KEVIN VELA / DOMINIQUE    DD:  01/11/2018 13:05:36  DT:  01/11/2018 14:35:04    D#:  [id number]  Job#:  [id number]    cc: Mansi ARRIETA

## 2018-01-22 DIAGNOSIS — K21.9 GASTROESOPHAGEAL REFLUX DISEASE WITHOUT ESOPHAGITIS: ICD-10-CM

## 2018-01-22 DIAGNOSIS — I10 ESSENTIAL HYPERTENSION: ICD-10-CM

## 2018-01-22 DIAGNOSIS — I25.2 HISTORY OF MI (MYOCARDIAL INFARCTION): ICD-10-CM

## 2018-01-22 DIAGNOSIS — E78.5 DYSLIPIDEMIA: ICD-10-CM

## 2018-01-22 RX ORDER — OMEPRAZOLE 20 MG/1
20 CAPSULE, DELAYED RELEASE ORAL
Qty: 90 CAP | Refills: 1 | Status: SHIPPED | OUTPATIENT
Start: 2018-01-22 | End: 2018-03-07

## 2018-02-08 ENCOUNTER — PATIENT OUTREACH (OUTPATIENT)
Dept: HEALTH INFORMATION MANAGEMENT | Facility: OTHER | Age: 78
End: 2018-02-08

## 2018-02-08 NOTE — PROGRESS NOTES
Outbound call to Kelly for medication review. Patient scheduled telephonic medication review for 02/09 at 1:30 pm.     Joey CalleD

## 2018-02-09 ENCOUNTER — PATIENT OUTREACH (OUTPATIENT)
Dept: HEALTH INFORMATION MANAGEMENT | Facility: OTHER | Age: 78
End: 2018-02-09

## 2018-02-09 NOTE — PROGRESS NOTES
Outbound call to [facility] for scheduled medication review. Left a voice message requesting patient to call [phone]. Sent a letter via [employer] describing our service. Will follow-up when patient makes contact.     Joey CalleD

## 2018-02-21 ENCOUNTER — PATIENT OUTREACH (OUTPATIENT)
Dept: HEALTH INFORMATION MANAGEMENT | Facility: OTHER | Age: 78
End: 2018-02-21

## 2018-02-21 NOTE — PROGRESS NOTES
Attempt #:Final    [facility] Verified: yes  Verify PCP: yes    Communication Preference Obtained: yes     Review Care Team: no    Annual Wellness Visit Scheduling  1. Scheduling Status:Scheduled // Pt prefers to go over med/ family history during appt.      Care Gap Scheduling (Attempt to Schedule EACH Overdue Care Gap!)// Declined Mammo. And said that she has a Fit Test that will complete later.  Health Maintenance Due   Topic Date Due   • IMM DTaP/Tdap/Td Vaccine (1 - Tdap) 07/31/1958   • IMM ZOSTER VACCINE  07/31/1999   • IMM PNEUMOCOCCAL 65+ (ADULT) LOW/MEDIUM RISK SERIES (1 of 2 - PCV13) 07/31/2004   • COLONOSCOPY  01/29/2017   • MAMMOGRAM  06/30/2017   • IMM INFLUENZA (1) 09/01/2017       MyChart Activation: already active

## 2018-03-03 PROBLEM — N18.30 CKD (CHRONIC KIDNEY DISEASE) STAGE 3, GFR 30-59 ML/MIN: Status: ACTIVE | Noted: 2018-03-03

## 2018-03-03 PROBLEM — M81.0 POST-MENOPAUSAL OSTEOPOROSIS: Status: ACTIVE | Noted: 2018-03-03

## 2018-03-07 ENCOUNTER — OFFICE VISIT (OUTPATIENT)
Dept: MEDICAL GROUP | Facility: MEDICAL CENTER | Age: 78
End: 2018-03-07
Payer: MEDICARE

## 2018-03-07 DIAGNOSIS — Z86.69 HISTORY OF BELL'S PALSY: ICD-10-CM

## 2018-03-07 DIAGNOSIS — I73.00 RAYNAUD'S DISEASE WITHOUT GANGRENE: ICD-10-CM

## 2018-03-07 DIAGNOSIS — Z23 NEED FOR PNEUMOCOCCAL VACCINATION: ICD-10-CM

## 2018-03-07 DIAGNOSIS — Z86.2 HISTORY OF ANEMIA: ICD-10-CM

## 2018-03-07 DIAGNOSIS — M81.0 POST-MENOPAUSAL OSTEOPOROSIS: ICD-10-CM

## 2018-03-07 DIAGNOSIS — Z00.00 MEDICARE ANNUAL WELLNESS VISIT, SUBSEQUENT: ICD-10-CM

## 2018-03-07 DIAGNOSIS — I25.2 HISTORY OF MI (MYOCARDIAL INFARCTION): ICD-10-CM

## 2018-03-07 DIAGNOSIS — R73.01 ELEVATED FASTING GLUCOSE: ICD-10-CM

## 2018-03-07 DIAGNOSIS — N18.30 CKD (CHRONIC KIDNEY DISEASE) STAGE 3, GFR 30-59 ML/MIN (HCC): ICD-10-CM

## 2018-03-07 DIAGNOSIS — K21.9 GASTROESOPHAGEAL REFLUX DISEASE WITHOUT ESOPHAGITIS: ICD-10-CM

## 2018-03-07 DIAGNOSIS — I10 ESSENTIAL HYPERTENSION: ICD-10-CM

## 2018-03-07 DIAGNOSIS — Z91.09 ENVIRONMENTAL ALLERGIES: ICD-10-CM

## 2018-03-07 DIAGNOSIS — E78.5 DYSLIPIDEMIA: ICD-10-CM

## 2018-03-07 DIAGNOSIS — Z85.01 HISTORY OF ESOPHAGEAL CANCER: ICD-10-CM

## 2018-03-07 DIAGNOSIS — M15.9 PRIMARY OSTEOARTHRITIS INVOLVING MULTIPLE JOINTS: ICD-10-CM

## 2018-03-07 PROCEDURE — G0009 ADMIN PNEUMOCOCCAL VACCINE: HCPCS | Performed by: NURSE PRACTITIONER

## 2018-03-07 PROCEDURE — 90670 PCV13 VACCINE IM: CPT | Performed by: NURSE PRACTITIONER

## 2018-03-07 PROCEDURE — G0439 PPPS, SUBSEQ VISIT: HCPCS | Mod: 25 | Performed by: NURSE PRACTITIONER

## 2018-03-07 RX ORDER — OMEPRAZOLE 40 MG/1
40 CAPSULE, DELAYED RELEASE ORAL DAILY
Qty: 90 CAP | Refills: 3 | Status: SHIPPED | OUTPATIENT
Start: 2018-03-07 | End: 2019-04-01 | Stop reason: SDUPTHER

## 2018-03-07 ASSESSMENT — ACTIVITIES OF DAILY LIVING (ADL): BATHING_REQUIRES_ASSISTANCE: 0

## 2018-03-07 ASSESSMENT — PATIENT HEALTH QUESTIONNAIRE - PHQ9: CLINICAL INTERPRETATION OF PHQ2 SCORE: 0

## 2018-03-07 NOTE — ASSESSMENT & PLAN NOTE
Last bone density 2016  Started on fosamax at that time but had GI difficulty and significant increase in GERD  Interested in other treatment options  Exercising regularly, no history of fracture

## 2018-03-07 NOTE — LETTER
[facility]  SABINA CazaresRIvaN.  [address]  [place] [address]  Fax: [phone]   Authorization for Release/Disclosure of   Protected Health Information   Name: MITESH BELLO : 1940 SSN: [id number]   Address: [address] [place] [address] [address]  [place] [address] Phone:    [phone] (home)    I authorize the entity listed below to release/disclose the PHI below to:   [facility]/SABINA CazaresRCARSON and RAUDEL Cazares   Provider or Entity Name:     Address   City, State, Zip   Phone:      Fax:     Reason for request: continuity of care   Information to be released:    [xxx  ] LAST COLONOSCOPY,  including any PATH REPORT and follow-up  [  ] LAST FIT/COLOGUARD RESULT [  ] LAST DEXA  [  ] LAST MAMMOGRAM  [  ] LAST PAP  [  ] LAST LABS [  ] RETINA EXAM REPORT  [  ] IMMUNIZATION RECORDS  [  ] Release all info      [  ] Check here and initial the line next to each item to release ALL health information INCLUDING  _____ Care and treatment for drug and / or alcohol abuse  _____ HIV testing, infection status, or AIDS  _____ Genetic Testing    DATES OF SERVICE OR TIME PERIOD TO BE DISCLOSED: _____________  I understand and acknowledge that:  * This Authorization may be revoked at any time by you in writing, except if your health information has already been used or disclosed.  * Your health information that will be used or disclosed as a result of you signing this authorization could be re-disclosed by the recipient. If this occurs, your re-disclosed health information may no longer be protected by State or Federal laws.  * You may refuse to sign this Authorization. Your refusal will not affect your ability to obtain treatment.  * This Authorization becomes effective upon signing and will  on (date) __________.      If no date is indicated, this Authorization will  one (1) year from the signature date.    Name: Mitesh Blelo    Signature:Continuity of care     Date:     3/7/2018       PLEASE FAX REQUESTED RECORDS BACK TO: [phone]

## 2018-03-07 NOTE — LETTER
[facility]  SABINA CazaresRIvaN.  [address]  [place] [address]  Fax: [phone]   Authorization for Release/Disclosure of   Protected Health Information   Name: MITESH BELLO : 1940 SSN: [id number]   Address: [address] [place] [address] [address]  [place] [address] Phone:    [phone] (home)    I authorize the entity listed below to release/disclose the PHI below to:   [facility]/SABINA CazaresRCARSON and RAUDEL Cazares   Provider or Entity Name:     Address   City, State, Zip   Phone:      Fax:     Reason for request: continuity of care   Information to be released:    [  ] LAST COLONOSCOPY,  including any PATH REPORT and follow-up  [  ] LAST FIT/COLOGUARD RESULT [  ] LAST DEXA  [  ] LAST MAMMOGRAM  [  ] LAST PAP  [  ] LAST LABS [  ] RETINA EXAM REPORT  [  ] IMMUNIZATION RECORDS  [  ] Release all info      [  ] Check here and initial the line next to each item to release ALL health information INCLUDING  _____ Care and treatment for drug and / or alcohol abuse  _____ HIV testing, infection status, or AIDS  _____ Genetic Testing    DATES OF SERVICE OR TIME PERIOD TO BE DISCLOSED: _____________  I understand and acknowledge that:  * This Authorization may be revoked at any time by you in writing, except if your health information has already been used or disclosed.  * Your health information that will be used or disclosed as a result of you signing this authorization could be re-disclosed by the recipient. If this occurs, your re-disclosed health information may no longer be protected by State or Federal laws.  * You may refuse to sign this Authorization. Your refusal will not affect your ability to obtain treatment.  * This Authorization becomes effective upon signing and will  on (date) __________.      If no date is indicated, this Authorization will  one (1) year from the signature date.    Name: Mitesh Bello    Signature:   Date:     3/7/2018            PLEASE FAX REQUESTED RECORDS BACK TO: [phone]

## 2018-03-07 NOTE — ASSESSMENT & PLAN NOTE
On omeprazole 20 mg daily, still having occasional symptoms. She done better in the past with a higher dose, requesting increase

## 2018-03-07 NOTE — ASSESSMENT & PLAN NOTE
History is a bit unclear. 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 We will request records from [facility]

## 2018-03-07 NOTE — LETTER
[facility]  Mansi Thakur A.P.R.N.  [address]  [place] [address]  Fax: [phone]   Authorization for Release/Disclosure of   Protected Health Information   Name: MITESH BELLO : 1940 SSN: [id number]   Address: [address] [place] [address] [address]  [facility] [address] Phone:    [phone] (home)    I authorize the entity listed below to release/disclose the PHI below to:   [facility]/RAUDEL Cazares and RAUDEL Cazares   Provider or Entity Name:  SOLOMON Dickey   Address   City, [facility]   Phone:      Fax:     Reason for request: continuity of care   Information to be released:    [  ] LAST COLONOSCOPY,  including any PATH REPORT and follow-up  [  ] LAST FIT/COLOGUARD RESULT [  ] LAST DEXA  [  ] LAST MAMMOGRAM  [  ] LAST PAP  [  ] LAST LABS [  ] RETINA EXAM REPORT  [  ] IMMUNIZATION RECORDS  [ x ] Release all info      [  ] Check here and initial the line next to each item to release ALL health information INCLUDING  _____ Care and treatment for drug and / or alcohol abuse  _____ HIV testing, infection status, or AIDS  _____ Genetic Testing    DATES OF SERVICE OR TIME PERIOD TO BE DISCLOSED: _____________  I understand and acknowledge that:  * This Authorization may be revoked at any time by you in writing, except if your health information has already been used or disclosed.  * Your health information that will be used or disclosed as a result of you signing this authorization could be re-disclosed by the recipient. If this occurs, your re-disclosed health information may no longer be protected by State or Federal laws.  * You may refuse to sign this Authorization. Your refusal will not affect your ability to obtain treatment.  * This Authorization becomes effective upon signing and will  on (date) __________.      If no date is indicated, this Authorization will  one (1) year from the signature date.    Name: Mitesh Bello    Signature:   Date:     3/7/2018       PLEASE FAX REQUESTED RECORDS BACK TO: [phone]

## 2018-03-07 NOTE — PROGRESS NOTES
Chief Complaint   Patient presents with   • Annual Wellness Visit         HPI:  Kelly is a 77 y.o. here for Medicare Annual Wellness Visit    CKD (chronic kidney disease) stage 3, GFR 30-59 ml/min  Stable with last GFR of 44    Dyslipidemia  Stable with atorvastatin, tolerating medication without SE including myalgia    COPD (chronic obstructive pulmonary disease) (CMS-[facility])  Stable and     Environmental allergies  Doing well with flonase as needed    Gastroesophageal reflux disease without esophagitis  On omeprazole 20 mg daily, still having occasional symptoms. She done better in the past with a higher dose, requesting increase    Hypertension  Stable on medication  No dizziness, fatigue    Post-menopausal osteoporosis  Last bone density 2016  Started on fosamax at that time but had GI difficulty and significant increase in GERD  Interested in other treatment options  Exercising regularly, no history of fracture    History of MI (myocardial infarction)  No recent difficulty with chest pain. She is on Plavix, statin  Follow-up planned with cardiology    History of Bell's palsy  2 episodes in the past, resolved at this time. No recent difficulty    Raynaud's disease  Pain and discoloration lower extremities with cold weather. No recent change or worsening    Primary osteoarthritis involving multiple joints  Stable, not requiring treatment at this time    History of esophageal cancer  History is a bit unclear. 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 We will request records from [facility]    Elevated fasting glucose  Last labs with fasting glucose of 107  No polyuria, polydipsia        Patient Active Problem List    Diagnosis Date Noted   • Post-menopausal osteoporosis 03/03/2018   • CKD (chronic kidney disease) stage 3, GFR 30-59 ml/min 03/03/2018   • History of esophageal cancer    • Anesthesia    • Hiatus hernia syndrome     • Dental disorder    • Primary osteoarthritis involving multiple joints 12/12/2017   • Environmental allergies 07/05/2017   • Dyslipidemia 01/04/2017   • Anxiety 06/09/2016   • Gastroesophageal reflux disease without esophagitis 06/09/2016   • Raynaud's disease 01/15/2016   • Hair thinning 01/15/2016   • Chronic fatigue 01/15/2016   • Bell palsy 05/14/2015   • Hypertension 04/01/2015   • History of MI (myocardial infarction) 01/29/2015   • COPD (chronic obstructive pulmonary disease) (CMS-HCC) 01/29/2015   • Shingles 01/01/2014   • Pneumonia 01/01/2007       Current Outpatient Prescriptions   Medication Sig Dispense Refill   • omeprazole (PRILOSEC) 20 MG delayed-release capsule Take 1 Cap by mouth every day. 90 Cap 1   • clopidogrel (PLAVIX) 75 MG Tab TAKE 1 TAB BY MOUTH EVERY DAY. 90 Tab 0   • atorvastatin (LIPITOR) 20 MG Tab TAKE 1 TAB BY MOUTH EVERY DAY. 30 Tab 11   • amoxicillin-clavulanate (AUGMENTIN) 875-125 MG Tab Take 1 Tab by mouth 2 times a day. 20 Tab 0   • fluticasone (FLOVENT HFA) 110 MCG/ACT Aerosol Inhale 2 Puffs by mouth 2 times a day. 1 Inhaler 3   • omeprazole (PRILOSEC) 20 MG delayed-release capsule Take 1 Cap by mouth every day. 30 Cap 1   • fluticasone (FLONASE) 50 MCG/ACT nasal spray USE 2 SPRAYS IN NOSE EVERY DAY. 16 g 1   • PROAIR  (90 Base) MCG/ACT Aero Soln inhalation aerosol INHALE 2 PUFFS BY MOUTH EVERY 6 HOURS AS NEEDED FOR SHORTNESS OF BREATH. 8.5 Inhaler 5   • hydrochlorothiazide (HYDRODIURIL) 25 MG Tab TAKE 1 TAB BY MOUTH EVERY DAY. 90 Tab 1   • triamcinolone acetonide (KENALOG) 0.1 % Cream APPLY 2 TO 4 GRAMS TO AFFECTED AREA(S) 2 TIMES A DAY. 30 g 0   • BUDEPRION  MG TABLET SR 12 HR sustained-release tablet Take 1 Tab by mouth every day. 30 Tab 2   • aspirin (ASA) 325 MG Tab Take 325 mg by mouth every 6 hours as needed.     • albuterol (VENTOLIN OR PROVENTIL) 108 (90 BASE) MCG/ACT Aero Soln inhalation aerosol Inhale 2 Puffs by mouth every 6 hours as needed for  Shortness of Breath. 8.5 g 3   • Ferrous Sulfate (IRON) 325 (65 FE) MG TABS Take 1 Tab by mouth 2 times a day, with meals.     • Naproxen Sodium (ALEVE PO) Take 1 Tab by mouth as needed.       No current facility-administered medications for this visit.         Patient is taking medications as noted in medication list.  Current supplements as per medication list.     Allergies: Doxycycline    Current social contact/activities: Pt spends time with grandchildren.      Is patient current with immunizations? No, due for FLU, PREVNAR (PCV13) , TDAP and ZOSTAVAX (Shingles). Patient is interested in receiving NONE today.    She  reports that she quit smoking about 21 years ago. Her smoking use included Cigarettes. She has a 50.00 pack-year smoking history. She has never used smokeless tobacco. She reports that she drinks alcohol. She reports that she does not use drugs.  Counseling given: Not Answered        DPA/Advanced directive: Pt prefers not to share.  ROS:    Gait: Uses no assistive device   Ostomy: no   Other tubes: no   Amputations: no   Chronic oxygen use no   Last eye exam a year and a half ago    Wears hearing aids: no   : Denies any urinary leakage during the last 6 months      Screening:    COPD    1.  Is patient under the care of a pulmonologist? No.  2.  Has patient ever completed a PFT or spirometry? Yes, on 1/18/2018.  3.  Is patient on oxygen or CPAP? No.  4.  Has patient ever had instruction on inhaler technique by health care professional? Yes   5.  Is patient interested in a referral to respiratory therapy for more information on COPD, inhaler technique, and/or information on establishing an action plan?  No, patient is NOT interested.    Depression Screening    Little interest or pleasure in doing things?  0 - not at all  Feeling down, depressed, or hopeless? 0 - not at all  Patient Health Questionnaire Score: 0    If depressive symptoms identified deferred to follow up visit unless specifically  addressed in assessment and plan.    Interpretation of PHQ-9 Total Score   Score Severity   1-4 No Depression   5-9 Mild Depression   10-14 Moderate Depression   15-19 Moderately Severe Depression   20-27 Severe Depression    Screening for Cognitive Impairment    Three Minute Recall (apple, watch, rosina)  3/3    Draw clock face with all 12 numbers set to the hand to show 10 minutes past 11 o'clock  1 4/5  If cognitive concerns identified, deferred for follow up unless specifically addressed in assessment and plan.    Fall Risk Assessment    Has the patient had two or more falls in the last year or any fall with injury in the last year?  No  If fall risk identified, deferred for follow up unless specifically addressed in assessment and plan.    Safety Assessment    Throw rugs on floor.  Yes  Handrails on all stairs.  Yes  Good lighting in all hallways.  Yes  Difficulty hearing.  No  Patient counseled about all safety risks that were identified.    Functional Assessment ADLs    Are there any barriers preventing you from cooking for yourself or meeting nutritional needs?  No.    Are there any barriers preventing you from driving safely or obtaining transportation?  No.    Are there any barriers preventing you from using a telephone or calling for help?  No.    Are there any barriers preventing you from shopping?  No.    Are there any barriers preventing you from taking care of your own finances?  No.    Are there any barriers preventing you from managing your medications?  No.    Are there any barriers preventing you from showering, bathing or dressing yourself?  No.    Are you currently engaging any exercise or physical activity?  Yes.  Pt works out at home daily and about to join a gym.     Health Maintenance Summary                IMM DTaP/Tdap/Td Vaccine Overdue 7/31/1958     IMM ZOSTER VACCINE Overdue 7/31/1999     IMM PNEUMOCOCCAL 65+ (ADULT) LOW/MEDIUM RISK SERIES Overdue 7/31/2004     COLONOSCOPY Overdue  1/29/2017      Previously completed 1/29/2007     MAMMOGRAM Overdue 6/30/2017      Done 6/30/2016 MA-SCREEN MAMMO W/CAD-BILAT    IMM INFLUENZA Overdue 9/1/2017      Done 11/17/2016 Imm Admin: Influenza Vaccine Adult HD     Patient has more history with this topic...    Annual Wellness Visit Next Due 7/6/2018      Done 7/5/2017 Visit Dx: Medicare annual wellness visit, subsequent    PFT SCREENING-FEV1 AND FEV/FVC RATIO / SPIROMETRY SHOULD BE PERFORMED ANNUALLY Next Due 1/10/2019      Done 1/10/2018 PFT DICTATED RESULTS    BONE DENSITY Next Due 6/30/2021      Done 6/30/2016 DS-BONE DENSITY STUDY (DEXA)          Patient Care Team:  RAUDEL Cazares as PCP - General (Family Medicine)  Gian Bruno M.D. as Consulting Physician (Ophthalmology)    Social History   Substance Use Topics   • Smoking status: Former Smoker     Packs/day: 2.00     Years: 25.00     Types: Cigarettes     Quit date: 6/6/1996   • Smokeless tobacco: Never Used   • Alcohol use 0.0 oz/week      Comment: once a week     Family History   Problem Relation Age of Onset   • Cancer Father      lung, smoker   • Cancer Brother      kidney, liver   • Arthritis Sister      Rheumatoid Arthritis     She  has a past medical history of Anesthesia; Anxiety (6/9/2016); Bell palsy (5/14/2015); Chronic fatigue (1/15/2016); CKD (chronic kidney disease) stage 3, GFR 30-59 ml/min (3/3/2018); COPD (chronic obstructive pulmonary disease) (CMS-HCC); Dental disorder; Dyslipidemia (1/4/2017); Environmental allergies (7/5/2017); GERD (gastroesophageal reflux disease); Hair thinning (1/15/2016); Hiatus hernia syndrome; History of esophageal cancer; Hypertension (4/1/2015); Myocardial infarct (1996); Pneumonia (2007); Post-menopausal osteoporosis (3/3/2018); Primary osteoarthritis involving multiple joints (12/12/2017); Raynaud's disease (1/15/2016); and Shingles (2014).   Past Surgical History:   Procedure Laterality Date   • CATARACT PHACO WITH IOL Right 11/15/2016     Procedure: CATARACT PHACO WITH IOL;  Surgeon: Gian Bruno M.D.;  Location: SURGERY [facility];  Service:    • CATARACT PHACO WITH IOL Left 11/1/2016    Procedure: CATARACT PHACO WITH IOL;  Surgeon: Gian Bruno M.D.;  Location: [facility];  Service:    • CAROTID ENDARTERECTOMY  11/8/2011    Performed by JB MIN at SURGERY [facility]   • VEIN LIGATION RADIO FREQUENCY  2/22/2011    Performed by JB MIN at SURGERY [facility]   • OTHER  2010    matri stem   • OTHER  2007    EGD to remove esophageal polyp   • OTHER CARDIAC SURGERY  1996    angioplasty   • APPENDECTOMY  1957    appendectomy           Exam:     There were no vitals taken for this visit. There is no height or weight on file to calculate BMI.    Hearing excellent.    Dentition fair dentures  Alert, oriented in no acute distress.  Eye contact is good, speech goal directed, affect calm  Heart: S1S2 no murmur  Lungs: Clear, equal, good aeration  Abd: soft, nontender    Assessment and Plan. The following treatment and monitoring plan is recommended:   1. Medicare annual wellness visit, subsequent  Problem list and medications reviewed and updated    2. CKD (chronic kidney disease) stage 3, GFR 30-59 ml/min  Stable, continue to monitor    3. Dyslipidemia  Stable, continue current medication  LIPID PROFILE        4. Environmental allergies  Doing well with Flonase    5. Gastroesophageal reflux disease without esophagitis  Still having occasional symptoms with 20 mg of omeprazole. Increase dose, follow up if not resolving  omeprazole (PRILOSEC) 40 MG delayed-release capsule   7. Essential hypertension  Stable, continue current medication  COMP METABOLIC PANEL   8. Post-menopausal osteoporosis  Did not tolerate Fosamax. Will refer for Reclast infusion    9. History of anemia  CBC WITH DIFFERENTIAL   10. Elevated fasting glucose  Counseled on diet and exercise recommendations  HEMOGLOBIN A1C   11. History  of MI (myocardial infarction)  Stable with no recent difficulty    12. History of Bell's palsy  No recent difficulty    13. Raynaud's disease without gangrene  Stable    14. Need for pneumococcal vaccination  I have placed the below orders and discussed them with an approved delegating provider. The MA is performing the below orders under the direction of Dr. Joya  PNEUMOCOCCAL CONJUGATE VACCINE 13-VALENT   15. Primary osteoarthritis involving multiple joints  Stable    16. History of esophageal cancer  Records requested as a history is somewhat unclear. She may be due for follow-up with gastroenterology          Services suggested: No services needed at this time  Health Care Screening recommendations as per orders if indicated.  Referrals offered: PT/OT/Nutrition counseling/Behavioral Health/Smoking cessation as per orders if indicated.    Discussion today about general wellness and lifestyle habits:    · Prevent falls and reduce trip hazards; Cautioned about securing or removing rugs.  · Have a working fire alarm and carbon monoxide detector;   · Engage in regular physical activity and social activities       Follow-up: 6 months, sooner as needed

## 2018-03-13 ENCOUNTER — HOSPITAL ENCOUNTER (OUTPATIENT)
Dept: LAB | Facility: MEDICAL CENTER | Age: 78
End: 2018-03-13
Attending: NURSE PRACTITIONER
Payer: MEDICARE

## 2018-03-13 DIAGNOSIS — Z86.2 HISTORY OF ANEMIA: ICD-10-CM

## 2018-03-13 DIAGNOSIS — E78.5 DYSLIPIDEMIA: ICD-10-CM

## 2018-03-13 DIAGNOSIS — I10 ESSENTIAL HYPERTENSION: ICD-10-CM

## 2018-03-13 DIAGNOSIS — R73.01 ELEVATED FASTING GLUCOSE: ICD-10-CM

## 2018-03-13 LAB
ALBUMIN SERPL BCP-MCNC: 4.1 G/DL (ref 3.2–4.9)
ALBUMIN/GLOB SERPL: 1.2 G/DL
ALP SERPL-CCNC: 76 U/L (ref 30–99)
ALT SERPL-CCNC: 11 U/L (ref 2–50)
ANION GAP SERPL CALC-SCNC: 7 MMOL/L (ref 0–11.9)
AST SERPL-CCNC: 15 U/L (ref 12–45)
BASOPHILS # BLD AUTO: 0.5 % (ref 0–1.8)
BASOPHILS # BLD: 0.05 K/UL (ref 0–0.12)
BILIRUB SERPL-MCNC: 0.5 MG/DL (ref 0.1–1.5)
BUN SERPL-MCNC: 19 MG/DL (ref 8–22)
CALCIUM SERPL-MCNC: 9.5 MG/DL (ref 8.5–10.5)
CHLORIDE SERPL-SCNC: 104 MMOL/L (ref 96–112)
CHOLEST SERPL-MCNC: 151 MG/DL (ref 100–199)
CO2 SERPL-SCNC: 30 MMOL/L (ref 20–33)
CREAT SERPL-MCNC: 1.06 MG/DL (ref 0.5–1.4)
EOSINOPHIL # BLD AUTO: 0.22 K/UL (ref 0–0.51)
EOSINOPHIL NFR BLD: 2 % (ref 0–6.9)
ERYTHROCYTE [DISTWIDTH] IN BLOOD BY AUTOMATED COUNT: 47.8 FL (ref 35.9–50)
EST. AVERAGE GLUCOSE BLD GHB EST-MCNC: 128 MG/DL
GLOBULIN SER CALC-MCNC: 3.4 G/DL (ref 1.9–3.5)
GLUCOSE SERPL-MCNC: 96 MG/DL (ref 65–99)
HBA1C MFR BLD: 6.1 % (ref 0–5.6)
HCT VFR BLD AUTO: 42.6 % (ref 37–47)
HDLC SERPL-MCNC: 33 MG/DL
HGB BLD-MCNC: 13.8 G/DL (ref 12–16)
IMM GRANULOCYTES # BLD AUTO: 0.04 K/UL (ref 0–0.11)
IMM GRANULOCYTES NFR BLD AUTO: 0.4 % (ref 0–0.9)
LDLC SERPL CALC-MCNC: 81 MG/DL
LYMPHOCYTES # BLD AUTO: 3.96 K/UL (ref 1–4.8)
LYMPHOCYTES NFR BLD: 35.9 % (ref 22–41)
MCH RBC QN AUTO: 32.4 PG (ref 27–33)
MCHC RBC AUTO-ENTMCNC: 32.4 G/DL (ref 33.6–35)
MCV RBC AUTO: 100 FL (ref 81.4–97.8)
MONOCYTES # BLD AUTO: 0.92 K/UL (ref 0–0.85)
MONOCYTES NFR BLD AUTO: 8.3 % (ref 0–13.4)
NEUTROPHILS # BLD AUTO: 5.83 K/UL (ref 2–7.15)
NEUTROPHILS NFR BLD: 52.9 % (ref 44–72)
NRBC # BLD AUTO: 0 K/UL
NRBC BLD-RTO: 0 /100 WBC
PLATELET # BLD AUTO: 276 K/UL (ref 164–446)
PMV BLD AUTO: 11.4 FL (ref 9–12.9)
POTASSIUM SERPL-SCNC: 3.8 MMOL/L (ref 3.6–5.5)
PROT SERPL-MCNC: 7.5 G/DL (ref 6–8.2)
RBC # BLD AUTO: 4.26 M/UL (ref 4.2–5.4)
SODIUM SERPL-SCNC: 141 MMOL/L (ref 135–145)
TRIGL SERPL-MCNC: 184 MG/DL (ref 0–149)
WBC # BLD AUTO: 11 K/UL (ref 4.8–10.8)

## 2018-03-13 PROCEDURE — 83036 HEMOGLOBIN GLYCOSYLATED A1C: CPT

## 2018-03-13 PROCEDURE — 80061 LIPID PANEL: CPT

## 2018-03-13 PROCEDURE — 36415 COLL VENOUS BLD VENIPUNCTURE: CPT

## 2018-03-13 PROCEDURE — 85025 COMPLETE CBC W/AUTO DIFF WBC: CPT

## 2018-03-13 PROCEDURE — 80053 COMPREHEN METABOLIC PANEL: CPT

## 2018-04-02 DIAGNOSIS — Z91.09 ENVIRONMENTAL ALLERGIES: ICD-10-CM

## 2018-04-02 RX ORDER — FLUTICASONE PROPIONATE 50 MCG
SPRAY, SUSPENSION (ML) NASAL
Qty: 1 BOTTLE | Refills: 0 | Status: SHIPPED | OUTPATIENT
Start: 2018-04-02 | End: 2018-07-11

## 2018-04-30 ENCOUNTER — TELEPHONE (OUTPATIENT)
Dept: MEDICAL GROUP | Facility: MEDICAL CENTER | Age: 78
End: 2018-04-30

## 2018-04-30 NOTE — TELEPHONE ENCOUNTER
[phone]     [facility] pharmacist called needing okay for pt to take omeprazole with the generic for plavix as she stated taking both meds increases the risk for adverse cardiac events.

## 2018-05-01 NOTE — TELEPHONE ENCOUNTER
LVM to discuss with patient, call back requested. I would like to know if she has taken ranitidine which would be an alternative medication for acid reflux

## 2018-05-01 NOTE — TELEPHONE ENCOUNTER
Phone Number Called: [phone]    Message: Pharmacy informed and verbalized understanding.     Left Message for patient to call back: no

## 2018-05-01 NOTE — TELEPHONE ENCOUNTER
Spoke with pt by phone. She had taken zantac (ranitidine) and it was ineffective.  Please notify pharmacy we are going to continue omeprazole and plavix for now

## 2018-06-18 ENCOUNTER — TELEPHONE (OUTPATIENT)
Dept: MEDICAL GROUP | Facility: MEDICAL CENTER | Age: 78
End: 2018-06-18

## 2018-06-18 DIAGNOSIS — Z86.69 HISTORY OF CATARACT: ICD-10-CM

## 2018-06-18 NOTE — TELEPHONE ENCOUNTER
Patient notified.  Patient states she is running out of breath a lot/hip bones hurt/and sometimes her legs lock up and its hard to walk. So she's requesting a temporary card.

## 2018-06-18 NOTE — TELEPHONE ENCOUNTER
Patient called stating she would like referral Dr. Dash Zamora. For optomology. Patient needs new referral.   Fax: [phone]    Patient also asking for temporary handicap card for parking.

## 2018-07-07 DIAGNOSIS — Z91.09 ENVIRONMENTAL ALLERGIES: ICD-10-CM

## 2018-07-09 RX ORDER — FLUTICASONE PROPIONATE 50 MCG
SPRAY, SUSPENSION (ML) NASAL
Qty: 16 G | Refills: 5 | Status: SHIPPED | OUTPATIENT
Start: 2018-07-09 | End: 2020-05-05 | Stop reason: SDUPTHER

## 2018-07-11 ENCOUNTER — OFFICE VISIT (OUTPATIENT)
Dept: MEDICAL GROUP | Facility: MEDICAL CENTER | Age: 78
End: 2018-07-11
Payer: MEDICARE

## 2018-07-11 VITALS
BODY MASS INDEX: 28.61 KG/M2 | TEMPERATURE: 97.4 F | HEART RATE: 66 BPM | SYSTOLIC BLOOD PRESSURE: 140 MMHG | WEIGHT: 178 LBS | OXYGEN SATURATION: 94 % | DIASTOLIC BLOOD PRESSURE: 70 MMHG | HEIGHT: 66 IN | RESPIRATION RATE: 16 BRPM

## 2018-07-11 DIAGNOSIS — R73.09 ELEVATED HEMOGLOBIN A1C: ICD-10-CM

## 2018-07-11 DIAGNOSIS — M15.9 PRIMARY OSTEOARTHRITIS INVOLVING MULTIPLE JOINTS: ICD-10-CM

## 2018-07-11 DIAGNOSIS — N18.30 CKD (CHRONIC KIDNEY DISEASE) STAGE 3, GFR 30-59 ML/MIN (HCC): ICD-10-CM

## 2018-07-11 DIAGNOSIS — E78.5 DYSLIPIDEMIA: ICD-10-CM

## 2018-07-11 DIAGNOSIS — I10 ESSENTIAL HYPERTENSION: ICD-10-CM

## 2018-07-11 DIAGNOSIS — Z91.09 ENVIRONMENTAL ALLERGIES: ICD-10-CM

## 2018-07-11 PROCEDURE — 99214 OFFICE O/P EST MOD 30 MIN: CPT | Performed by: NURSE PRACTITIONER

## 2018-07-11 NOTE — ASSESSMENT & PLAN NOTE
Blood pressure has been controlled on hydrochlorothiazide  She is having more difficulty with muscle cramping  No chest pain, dizziness

## 2018-07-11 NOTE — PROGRESS NOTES
Subjective:     Chief Complaint   Patient presents with   • Shortness of Breath     HANDICAP PLACARD EVALUATION   • Sinus Problem     Kelly Bello is a 77 y.o. female here today to follow up on:    Hypertension  Blood pressure has been controlled on hydrochlorothiazide  She is having more difficulty with muscle cramping  No chest pain, dizziness    Environmental allergies  Increased difficulty with congestion over the last several weeks despite daily use of Flonase. No oral medications. This is most problematic in the morning, she wakes up with drainage in the back of her throat, has to cough and clear her nasal passages  No facial pain, fever, headaches    Primary osteoarthritis involving multiple joints  Increased difficulty over the last several months, particularly with the left hip and the right knee. She has problems standing or walking long distances, she is no longer been able to play golf which is a disappointment for her. She has intermittent swelling in the right knee. She's having a difficult time making it from her vehicle into the store sometimes, is requesting a handicap placard. She is on antiplatelet medication and also has stage III chronic kidney disease, she avoids NSAIDs. She occasionally takes over-the-counter Tylenol but does not feel this has been beneficial  She's interested in seeing an orthopedist     Elevated A1c  Last A1c 6.1, no medication at this time. She is watching her diet, trying to exercise but having difficulty because of her joint pain. She's gained 10 pounds.  No polyuria, polydipsia    Current medicines (including changes today)  Current Outpatient Prescriptions   Medication Sig Dispense Refill   • fluticasone (FLONASE) 50 MCG/ACT nasal spray USE 2 SPRAYS IN NOSE EVERY DAY. 16 g 5   • clopidogrel (PLAVIX) 75 MG Tab TAKE 1 TAB BY MOUTH EVERY DAY. 90 Tab 3   • omeprazole (PRILOSEC) 40 MG delayed-release capsule Take 1 Cap by mouth every day. 90 Cap 3   • atorvastatin  "(LIPITOR) 20 MG Tab TAKE 1 TAB BY MOUTH EVERY DAY. 30 Tab 11   • fluticasone (FLOVENT HFA) 110 MCG/ACT Aerosol Inhale 2 Puffs by mouth 2 times a day. 1 Inhaler 3   • omeprazole (PRILOSEC) 20 MG delayed-release capsule Take 1 Cap by mouth every day. 30 Cap 1   • PROAIR  (90 Base) MCG/ACT Aero Soln inhalation aerosol INHALE 2 PUFFS BY MOUTH EVERY 6 HOURS AS NEEDED FOR SHORTNESS OF BREATH. 8.5 Inhaler 5   • hydrochlorothiazide (HYDRODIURIL) 25 MG Tab TAKE 1 TAB BY MOUTH EVERY DAY. 90 Tab 1   • triamcinolone acetonide (KENALOG) 0.1 % Cream APPLY 2 TO 4 GRAMS TO AFFECTED AREA(S) 2 TIMES A DAY. 30 g 0   • aspirin (ASA) 325 MG Tab Take 325 mg by mouth every 6 hours as needed.     • albuterol (VENTOLIN OR PROVENTIL) 108 (90 BASE) MCG/ACT Aero Soln inhalation aerosol Inhale 2 Puffs by mouth every 6 hours as needed for Shortness of Breath. 8.5 g 3   • Ferrous Sulfate (IRON) 325 (65 FE) MG TABS Take 1 Tab by mouth 2 times a day, with meals.     • Naproxen Sodium (ALEVE PO) Take 1 Tab by mouth as needed.       No current facility-administered medications for this visit.      She  has a past medical history of Anesthesia; Anxiety (6/9/2016); Bell palsy (5/14/2015); Chronic fatigue (1/15/2016); CKD (chronic kidney disease) stage 3, GFR 30-59 ml/min (3/3/2018); COPD (chronic obstructive pulmonary disease) ([facility]); Dental disorder; Dyslipidemia (1/4/2017); Environmental allergies (7/5/2017); GERD (gastroesophageal reflux disease); Hair thinning (1/15/2016); Hiatus hernia syndrome; History of esophageal cancer; Hypertension (4/1/2015); Myocardial infarct ([facility]) (1996); Pneumonia (2007); Post-menopausal osteoporosis (3/3/2018); Primary osteoarthritis involving multiple joints (12/12/2017); Raynaud's disease (1/15/2016); and Shingles (2014).    ROS included above     Objective:     Blood pressure 140/70, pulse 66, temperature 36.3 °C (97.4 °F), resp. rate 16, height 1.676 m (5' 6\"), weight 80.7 kg (178 lb), SpO2 94 %. Body mass " index is 28.73 kg/m².     Physical Exam:  General: Alert, oriented in no acute distress.  Eye contact is good, speech is normal, affect calm  HEENT: Oral mucosa pink moist, no lesions. Nares with clear mucus. No maxillary or frontal sinus tenderness. TMs gray with good landmarks bilaterally. No lymphadenopathy.  Lungs: clear to auscultation bilaterally, normal effort, no wheeze/ rhonchi/ rales.  CV: regular rate and rhythm, S1, S2  MS: Mild swelling of the right knee without point tenderness, no redness or heat. Normal range of motion. No tenderness over the anterior posterior left hip, limited flexion. Normal gait  Ext: no edema, color normal, vascularity normal, temperature normal    Assessment and Plan:   The following treatment plan was discussed   1. Environmental allergies  Continues to have persistent congestion despite daily use of Flonase. Advised to add Zyrtec. If not improving over the next 2 weeks would consider azelastine nasal    2. Primary osteoarthritis involving multiple joints  Difficulty with multiple joints but primary concern being the left hip and right knee. Unable to take NSAIDs, not getting much relief with Tylenol. She is interested in seeing orthopedics, referral placed. Handicap placard form completed  REFERRAL TO ORTHOPEDICS   3. Elevated hemoglobin A1c  Watching her diet although she has gained 10 pounds. Repeat labs  HEMOGLOBIN A1C   4. Dyslipidemia  Doing well on atorvastatin  LIPID PROFILE   5. Essential hypertension  Stable    6. CKD (chronic kidney disease) stage 3, GFR 30-59 ml/min  Continue to monitor  COMP METABOLIC PANEL       Followup: 3 months with labs prior        Please note that this dictation was created using voice recognition software. I have worked with consultants from the vendor as well as technical experts from [employer] to optimize the interface. I have made every reasonable attempt to correct obvious errors, but I expect that there are errors of grammar and  possibly content that I did not discover before finalizing the note.

## 2018-07-11 NOTE — ASSESSMENT & PLAN NOTE
Increased difficulty with congestion over the last several weeks despite daily use of Flonase. No oral medications. This is most problematic in the morning, she wakes up with drainage in the back of her throat, has to cough and clear her nasal passages  No facial pain, fever, headaches

## 2018-07-11 NOTE — ASSESSMENT & PLAN NOTE
Increased difficulty over the last several months, particularly with the left hip and the right knee. She has problems standing or walking long distances, she is no longer been able to play golf which is a disappointment for her. She has intermittent swelling in the right knee. She's having a difficult time making it from her vehicle into the store sometimes, is requesting a handicap placard. She is on antiplatelet medication and also has stage III chronic kidney disease, she avoids NSAIDs. She occasionally takes over-the-counter Tylenol but does not feel this has been beneficial  She's interested in seeing an orthopedist

## 2018-10-17 ENCOUNTER — HOSPITAL ENCOUNTER (OUTPATIENT)
Dept: LAB | Facility: MEDICAL CENTER | Age: 78
End: 2018-10-17
Attending: NURSE PRACTITIONER
Payer: MEDICARE

## 2018-10-17 DIAGNOSIS — N18.30 CKD (CHRONIC KIDNEY DISEASE) STAGE 3, GFR 30-59 ML/MIN (HCC): ICD-10-CM

## 2018-10-17 DIAGNOSIS — R73.09 ELEVATED HEMOGLOBIN A1C: ICD-10-CM

## 2018-10-17 DIAGNOSIS — E78.5 DYSLIPIDEMIA: ICD-10-CM

## 2018-10-17 LAB
ALBUMIN SERPL BCP-MCNC: 3.9 G/DL (ref 3.2–4.9)
ALBUMIN/GLOB SERPL: 1.1 G/DL
ALP SERPL-CCNC: 76 U/L (ref 30–99)
ALT SERPL-CCNC: 17 U/L (ref 2–50)
ANION GAP SERPL CALC-SCNC: 9 MMOL/L (ref 0–11.9)
AST SERPL-CCNC: 18 U/L (ref 12–45)
BILIRUB SERPL-MCNC: 0.6 MG/DL (ref 0.1–1.5)
BUN SERPL-MCNC: 22 MG/DL (ref 8–22)
CALCIUM SERPL-MCNC: 9.6 MG/DL (ref 8.5–10.5)
CHLORIDE SERPL-SCNC: 104 MMOL/L (ref 96–112)
CHOLEST SERPL-MCNC: 147 MG/DL (ref 100–199)
CO2 SERPL-SCNC: 29 MMOL/L (ref 20–33)
CREAT SERPL-MCNC: 1.2 MG/DL (ref 0.5–1.4)
EST. AVERAGE GLUCOSE BLD GHB EST-MCNC: 137 MG/DL
FASTING STATUS PATIENT QL REPORTED: NORMAL
GLOBULIN SER CALC-MCNC: 3.6 G/DL (ref 1.9–3.5)
GLUCOSE SERPL-MCNC: 101 MG/DL (ref 65–99)
HBA1C MFR BLD: 6.4 % (ref 0–5.6)
HDLC SERPL-MCNC: 40 MG/DL
LDLC SERPL CALC-MCNC: 79 MG/DL
POTASSIUM SERPL-SCNC: 3.9 MMOL/L (ref 3.6–5.5)
PROT SERPL-MCNC: 7.5 G/DL (ref 6–8.2)
SODIUM SERPL-SCNC: 142 MMOL/L (ref 135–145)
TRIGL SERPL-MCNC: 140 MG/DL (ref 0–149)

## 2018-10-17 PROCEDURE — 80053 COMPREHEN METABOLIC PANEL: CPT

## 2018-10-17 PROCEDURE — 83036 HEMOGLOBIN GLYCOSYLATED A1C: CPT

## 2018-10-17 PROCEDURE — 36415 COLL VENOUS BLD VENIPUNCTURE: CPT

## 2018-10-17 PROCEDURE — 80061 LIPID PANEL: CPT

## 2018-11-02 DIAGNOSIS — I10 ESSENTIAL HYPERTENSION: ICD-10-CM

## 2018-11-02 RX ORDER — HYDROCHLOROTHIAZIDE 25 MG/1
25 TABLET ORAL DAILY
Qty: 90 TAB | Refills: 1 | Status: SHIPPED | OUTPATIENT
Start: 2018-11-02 | End: 2019-04-25 | Stop reason: SDUPTHER

## 2018-12-31 DIAGNOSIS — Z91.09 ENVIRONMENTAL ALLERGIES: ICD-10-CM

## 2018-12-31 DIAGNOSIS — J41.1 MUCOPURULENT CHRONIC BRONCHITIS (HCC): ICD-10-CM

## 2018-12-31 RX ORDER — FLUTICASONE PROPIONATE 110 UG/1
2 AEROSOL, METERED RESPIRATORY (INHALATION) 2 TIMES DAILY
Qty: 1 INHALER | Refills: 3 | Status: SHIPPED | OUTPATIENT
Start: 2018-12-31 | End: 2019-04-28 | Stop reason: SDUPTHER

## 2019-01-08 ENCOUNTER — OFFICE VISIT (OUTPATIENT)
Dept: URGENT CARE | Facility: PHYSICIAN GROUP | Age: 79
End: 2019-01-08
Payer: MEDICARE

## 2019-01-08 VITALS
HEIGHT: 66 IN | TEMPERATURE: 97 F | OXYGEN SATURATION: 93 % | WEIGHT: 172 LBS | RESPIRATION RATE: 18 BRPM | BODY MASS INDEX: 27.64 KG/M2 | SYSTOLIC BLOOD PRESSURE: 150 MMHG | DIASTOLIC BLOOD PRESSURE: 90 MMHG | HEART RATE: 72 BPM

## 2019-01-08 DIAGNOSIS — J42 CHRONIC BRONCHITIS, UNSPECIFIED CHRONIC BRONCHITIS TYPE (HCC): ICD-10-CM

## 2019-01-08 DIAGNOSIS — R03.0 ELEVATED BLOOD PRESSURE READING: ICD-10-CM

## 2019-01-08 PROCEDURE — 99214 OFFICE O/P EST MOD 30 MIN: CPT | Performed by: PHYSICIAN ASSISTANT

## 2019-01-08 RX ORDER — AMOXICILLIN AND CLAVULANATE POTASSIUM 875; 125 MG/1; MG/1
1 TABLET, FILM COATED ORAL 2 TIMES DAILY
Qty: 14 TAB | Refills: 0 | Status: SHIPPED | OUTPATIENT
Start: 2019-01-08 | End: 2019-01-15

## 2019-01-12 ASSESSMENT — ENCOUNTER SYMPTOMS
EYE DISCHARGE: 0
WHEEZING: 1
SORE THROAT: 0
HEADACHES: 1
VOMITING: 0
DIZZINESS: 0
COUGH: 1
SINUS PAIN: 1
DIARRHEA: 0
FEVER: 0
SHORTNESS OF BREATH: 0
MYALGIAS: 0
TINGLING: 0
CHILLS: 0
EYE REDNESS: 0

## 2019-01-12 ASSESSMENT — COPD QUESTIONNAIRES: COPD: 1

## 2019-01-12 NOTE — PROGRESS NOTES
"Subjective:      Randi Bello is a 78 y.o. female who presents with Cough ([id number] )            Cough   This is a new problem. The current episode started 1 to 4 weeks ago. The problem has been waxing and waning. The problem occurs every few minutes. The cough is productive of sputum. Associated symptoms include headaches, nasal congestion, postnasal drip and wheezing. Pertinent negatives include no chest pain, chills, ear pain, eye redness, fever, myalgias, rash, sore throat or shortness of breath. Associated symptoms comments: Reports sinus headache  . The symptoms are aggravated by exercise. She has tried a beta-agonist inhaler and OTC cough suppressant for the symptoms. The treatment provided mild relief. Her past medical history is significant for bronchitis and COPD.       Review of Systems   Constitutional: Positive for malaise/fatigue. Negative for chills and fever.   HENT: Positive for congestion, postnasal drip and sinus pain. Negative for ear discharge, ear pain and sore throat.         Pos. For ear pressure     Eyes: Negative for discharge and redness.   Respiratory: Positive for cough and wheezing. Negative for shortness of breath.    Cardiovascular: Negative for chest pain.   Gastrointestinal: Negative for diarrhea and vomiting.   Genitourinary: Negative for dysuria and urgency.   Musculoskeletal: Negative for myalgias.   Skin: Negative for itching and rash.   Neurological: Positive for headaches. Negative for dizziness and tingling.   All other systems reviewed and are negative.         Objective:     /90   Pulse 72   Temp 36.1 °C (97 °F) (Temporal)   Resp 18   Ht 1.676 m (5' 6\")   Wt 78 kg (172 lb)   SpO2 93%   BMI 27.76 kg/m²    PMH:  has a past medical history of Anesthesia; Anxiety (6/9/2016); Bell palsy (5/14/2015); Chronic fatigue (1/15/2016); CKD (chronic kidney disease) stage 3, GFR 30-59 ml/min ([facility]) (3/3/2018); COPD (chronic obstructive pulmonary disease) ([facility]); " Dental disorder; Dyslipidemia (1/4/2017); Environmental allergies (7/5/2017); GERD (gastroesophageal reflux disease); Hair thinning (1/15/2016); Hiatus hernia syndrome; History of esophageal cancer; Hypertension (4/1/2015); Myocardial infarct (HCC) (1996); Pneumonia (2007); Post-menopausal osteoporosis (3/3/2018); Primary osteoarthritis involving multiple joints (12/12/2017); Raynaud's disease (1/15/2016); and Shingles (2014).  MEDS:   Current Outpatient Prescriptions:   •  amoxicillin-clavulanate (AUGMENTIN) 875-125 MG Tab, Take 1 Tab by mouth 2 times a day for 7 days., Disp: 14 Tab, Rfl: 0  •  hydroCHLOROthiazide (HYDRODIURIL) 25 MG Tab, TAKE 1 TAB BY MOUTH EVERY DAY., Disp: 90 Tab, Rfl: 1  •  clopidogrel (PLAVIX) 75 MG Tab, TAKE 1 TAB BY MOUTH EVERY DAY., Disp: 90 Tab, Rfl: 3  •  omeprazole (PRILOSEC) 40 MG delayed-release capsule, Take 1 Cap by mouth every day., Disp: 90 Cap, Rfl: 3  •  atorvastatin (LIPITOR) 20 MG Tab, TAKE 1 TAB BY MOUTH EVERY DAY., Disp: 30 Tab, Rfl: 11  •  omeprazole (PRILOSEC) 20 MG delayed-release capsule, Take 1 Cap by mouth every day., Disp: 30 Cap, Rfl: 1  •  aspirin (ASA) 325 MG Tab, Take 325 mg by mouth every 6 hours as needed., Disp: , Rfl:   •  Ferrous Sulfate (IRON) 325 (65 FE) MG TABS, Take 1 Tab by mouth 2 times a day, with meals., Disp: , Rfl:   •  fluticasone (FLOVENT HFA) 110 MCG/ACT Aerosol, Inhale 2 Puffs by mouth 2 times a day., Disp: 1 Inhaler, Rfl: 3  •  fluticasone (FLONASE) 50 MCG/ACT nasal spray, USE 2 SPRAYS IN NOSE EVERY DAY., Disp: 16 g, Rfl: 5  •  PROAIR  (90 Base) MCG/ACT Aero Soln inhalation aerosol, INHALE 2 PUFFS BY MOUTH EVERY 6 HOURS AS NEEDED FOR SHORTNESS OF BREATH., Disp: 8.5 Inhaler, Rfl: 5  •  triamcinolone acetonide (KENALOG) 0.1 % Cream, APPLY 2 TO 4 GRAMS TO AFFECTED AREA(S) 2 TIMES A DAY. (Patient not taking: Reported on 1/8/2019), Disp: 30 g, Rfl: 0  •  albuterol (VENTOLIN OR PROVENTIL) 108 (90 BASE) MCG/ACT Aero Soln inhalation aerosol,  Inhale 2 Puffs by mouth every 6 hours as needed for Shortness of Breath., Disp: 8.5 g, Rfl: 3  •  Naproxen Sodium (ALEVE PO), Take 1 Tab by mouth as needed., Disp: , Rfl:   ALLERGIES:   Allergies   Allergen Reactions   • Doxycycline Nausea     Pt also experienced Vertigo on this medication.      SURGHX:   Past Surgical History:   Procedure Laterality Date   • CATARACT PHACO WITH IOL Right 11/15/2016    Procedure: CATARACT PHACO WITH IOL;  Surgeon: Gian Bruno M.D.;  Location: SURGERY SURGICAL [facility] ORS;  Service:    • CATARACT PHACO WITH IOL Left 11/1/2016    Procedure: CATARACT PHACO WITH IOL;  Surgeon: Gian Bruno M.D.;  Location: SURGERY [facility] ORS;  Service:    • CAROTID ENDARTERECTOMY  11/8/2011    Performed by JB MIN at SURGERY [facility]S   • VEIN LIGATION RADIO FREQUENCY  2/22/2011    Performed by JB MIN at SURGERY [facility] ORS   • OTHER  2010    matri stem   • OTHER  2007    EGD to remove esophageal polyp   • OTHER CARDIAC SURGERY  1996    angioplasty   • APPENDECTOMY  1957    appendectomy     SOCHX:  reports that she quit smoking about 22 years ago. Her smoking use included Cigarettes. She has a 50.00 pack-year smoking history. She has never used smokeless tobacco. She reports that she drinks alcohol. She reports that she does not use drugs.  FH: Family history was reviewed, no pertinent findings to report    Physical Exam   Constitutional: She is oriented to person, place, and time. She appears well-developed and well-nourished.   HENT:   Head: Normocephalic and atraumatic.   Mouth/Throat: No oropharyngeal exudate.   Bilateral clear effusions- without bulge or erythema to the TM.   Posterior oropharynx with pos. PND without tonsillar edema or erythema.   Nose- boggy turbinates with mild-moderate amount of nasal discharge. Bilateral maxillary sinus tenderness with percussion.    Eyes: Pupils are equal, round, and reactive to light. EOM are normal.   Neck: Normal  range of motion. Neck supple.   Cardiovascular: Normal rate and regular rhythm.    Pulmonary/Chest: Effort normal. No respiratory distress. She has wheezes.   Harsh bronchial cough.      Musculoskeletal: Normal range of motion. She exhibits no edema.   Lymphadenopathy:     She has no cervical adenopathy.   Neurological: She is alert and oriented to person, place, and time.   Skin: Skin is warm. No rash noted.   Psychiatric: She has a normal mood and affect. Her behavior is normal.   Vitals reviewed.              Assessment/Plan:     1. Chronic bronchitis, unspecified chronic bronchitis type (HCC)  - amoxicillin-clavulanate (AUGMENTIN) 875-125 MG Tab; Take 1 Tab by mouth 2 times a day for 7 days.  Dispense: 14 Tab; Refill: 0    2. Elevated blood pressure reading    POX recheck 95 %. Augmentin was chosen as she has been the most successful with such in the past. Avoid OTC decongestants- as patient has elevated Bp reading in the clinic today.      Encouraged OTC supportive meds PRN. Humidification, increase fluids, avoid night time dairy.   Patient given precautionary s/sx that mandate immediate follow up and evaluation in the ED. Advised of risks of not doing so.    DDX, Supportive care, and indications for immediate follow-up discussed with patient.    Instructed to return to clinic or nearest emergency department if we are not available for any change in condition, further concerns, or worsening of symptoms.    The patient demonstrated a good understanding and agreed with the treatment plan.

## 2019-01-14 ENCOUNTER — HOSPITAL ENCOUNTER (OUTPATIENT)
Dept: RADIOLOGY | Facility: MEDICAL CENTER | Age: 79
End: 2019-01-14
Attending: FAMILY MEDICINE
Payer: MEDICARE

## 2019-01-14 ENCOUNTER — OFFICE VISIT (OUTPATIENT)
Dept: URGENT CARE | Facility: PHYSICIAN GROUP | Age: 79
End: 2019-01-14
Payer: MEDICARE

## 2019-01-14 VITALS
DIASTOLIC BLOOD PRESSURE: 70 MMHG | HEART RATE: 71 BPM | OXYGEN SATURATION: 93 % | TEMPERATURE: 97.5 F | SYSTOLIC BLOOD PRESSURE: 120 MMHG | BODY MASS INDEX: 28.12 KG/M2 | RESPIRATION RATE: 16 BRPM | WEIGHT: 175 LBS | HEIGHT: 66 IN

## 2019-01-14 DIAGNOSIS — J22 LRTI (LOWER RESPIRATORY TRACT INFECTION): ICD-10-CM

## 2019-01-14 PROCEDURE — 99214 OFFICE O/P EST MOD 30 MIN: CPT | Performed by: FAMILY MEDICINE

## 2019-01-14 PROCEDURE — 71046 X-RAY EXAM CHEST 2 VIEWS: CPT

## 2019-01-14 RX ORDER — AZITHROMYCIN 250 MG/1
TABLET, FILM COATED ORAL
Qty: 6 TAB | Refills: 0 | Status: SHIPPED | OUTPATIENT
Start: 2019-01-14 | End: 2019-08-14

## 2019-01-14 ASSESSMENT — ENCOUNTER SYMPTOMS
VOMITING: 0
COUGH: 1
SINUS PAIN: 0

## 2019-01-14 NOTE — PROGRESS NOTES
"Subjective:   Randi Bello is a 78 y.o. female who presents for URI (x 6 days)        URI    This is a new problem. The current episode started in the past 7 days. The problem has been gradually worsening. There has been no fever. Associated symptoms include coughing. Pertinent negatives include no congestion, joint pain, sinus pain, sneezing or vomiting.     Review of Systems   HENT: Negative for congestion, sinus pain and sneezing.    Respiratory: Positive for cough.    Gastrointestinal: Negative for vomiting.   Musculoskeletal: Negative for joint pain.     Allergies   Allergen Reactions   • Doxycycline Nausea     Pt also experienced Vertigo on this medication.       Objective:   /70   Pulse 71   Temp 36.4 °C (97.5 °F) (Temporal)   Resp 16   Ht 1.676 m (5' 6\")   Wt 79.4 kg (175 lb)   SpO2 93%   BMI 28.25 kg/m²   Physical Exam   Constitutional: She is oriented to person, place, and time. She appears well-developed and well-nourished. No distress.   HENT:   Head: Normocephalic and atraumatic.   Eyes: Pupils are equal, round, and reactive to light. Conjunctivae and EOM are normal.   Cardiovascular: Normal rate and regular rhythm.    No murmur heard.  Pulmonary/Chest: Effort normal. No respiratory distress. She has wheezes. She has no rales.   Abdominal: Soft. She exhibits no distension. There is no tenderness.   Neurological: She is alert and oriented to person, place, and time. She has normal reflexes. No sensory deficit.   Skin: Skin is warm and dry.   Psychiatric: She has a normal mood and affect.         Assessment/Plan:   1. LRTI (lower respiratory tract infection)  - DX-CHEST-2 VIEWS; Future  - azithromycin (ZITHROMAX) 250 MG Tab; Take 2 tablets by mouth on day one. Take one tablet by mouth the remaining days until gone  Dispense: 6 Tab; Refill: 0    Differential diagnosis, natural history, supportive care, and indications for immediate follow-up discussed.       "

## 2019-01-28 ENCOUNTER — OFFICE VISIT (OUTPATIENT)
Dept: MEDICAL GROUP | Facility: MEDICAL CENTER | Age: 79
End: 2019-01-28
Payer: MEDICARE

## 2019-01-28 ENCOUNTER — HOSPITAL ENCOUNTER (OUTPATIENT)
Facility: MEDICAL CENTER | Age: 79
End: 2019-01-28
Attending: NURSE PRACTITIONER
Payer: MEDICARE

## 2019-01-28 VITALS
OXYGEN SATURATION: 95 % | WEIGHT: 174 LBS | TEMPERATURE: 96.6 F | SYSTOLIC BLOOD PRESSURE: 134 MMHG | HEIGHT: 66 IN | BODY MASS INDEX: 27.97 KG/M2 | DIASTOLIC BLOOD PRESSURE: 62 MMHG | HEART RATE: 72 BPM | RESPIRATION RATE: 16 BRPM

## 2019-01-28 DIAGNOSIS — I10 ESSENTIAL HYPERTENSION: ICD-10-CM

## 2019-01-28 DIAGNOSIS — I25.10 CORONARY ARTERY DISEASE INVOLVING NATIVE CORONARY ARTERY OF NATIVE HEART WITHOUT ANGINA PECTORIS: ICD-10-CM

## 2019-01-28 DIAGNOSIS — R53.82 CHRONIC FATIGUE: ICD-10-CM

## 2019-01-28 DIAGNOSIS — N18.30 CKD (CHRONIC KIDNEY DISEASE) STAGE 3, GFR 30-59 ML/MIN (HCC): ICD-10-CM

## 2019-01-28 DIAGNOSIS — R73.03 PREDIABETES: ICD-10-CM

## 2019-01-28 DIAGNOSIS — J43.1 PANLOBULAR EMPHYSEMA (HCC): ICD-10-CM

## 2019-01-28 DIAGNOSIS — J22 LOWER RESPIRATORY INFECTION: ICD-10-CM

## 2019-01-28 DIAGNOSIS — R10.2 SUPRAPUBIC PRESSURE: ICD-10-CM

## 2019-01-28 DIAGNOSIS — E78.5 DYSLIPIDEMIA: ICD-10-CM

## 2019-01-28 DIAGNOSIS — Z12.31 ENCOUNTER FOR SCREENING MAMMOGRAM FOR BREAST CANCER: ICD-10-CM

## 2019-01-28 LAB
APPEARANCE UR: CLEAR
BILIRUB UR STRIP-MCNC: NEGATIVE MG/DL
COLOR UR AUTO: YELLOW
GLUCOSE UR STRIP.AUTO-MCNC: NEGATIVE MG/DL
KETONES UR STRIP.AUTO-MCNC: NEGATIVE MG/DL
LEUKOCYTE ESTERASE UR QL STRIP.AUTO: NORMAL
NITRITE UR QL STRIP.AUTO: NEGATIVE
PH UR STRIP.AUTO: 7 [PH] (ref 5–8)
PROT UR QL STRIP: NEGATIVE MG/DL
RBC UR QL AUTO: NORMAL
SP GR UR STRIP.AUTO: 1.01
UROBILINOGEN UR STRIP-MCNC: 0.2 MG/DL

## 2019-01-28 PROCEDURE — 87186 SC STD MICRODIL/AGAR DIL: CPT

## 2019-01-28 PROCEDURE — 87086 URINE CULTURE/COLONY COUNT: CPT

## 2019-01-28 PROCEDURE — 8041 PR SCP AHA: Performed by: NURSE PRACTITIONER

## 2019-01-28 PROCEDURE — 99214 OFFICE O/P EST MOD 30 MIN: CPT | Performed by: NURSE PRACTITIONER

## 2019-01-28 PROCEDURE — 81002 URINALYSIS NONAUTO W/O SCOPE: CPT | Performed by: NURSE PRACTITIONER

## 2019-01-28 PROCEDURE — 87077 CULTURE AEROBIC IDENTIFY: CPT

## 2019-01-28 RX ORDER — NITROFURANTOIN 25; 75 MG/1; MG/1
100 CAPSULE ORAL 2 TIMES DAILY
Qty: 6 CAP | Refills: 0 | Status: SHIPPED | OUTPATIENT
Start: 2019-01-28 | End: 2019-11-14

## 2019-01-28 ASSESSMENT — PATIENT HEALTH QUESTIONNAIRE - PHQ9: CLINICAL INTERPRETATION OF PHQ2 SCORE: 0

## 2019-01-28 NOTE — PROGRESS NOTES
Annual Health Assessment Questions:    1.  Are you currently engaging in any exercise or physical activity? Yes    2.  How would you describe your mood or emotional well-being today? good    3.  Have you had any falls in the last year? No    4.  Have you noticed any problems with your balance or had difficulty walking? No    5.  In the last six months have you experienced any leakage of urine? Yes    6. DPA/Advanced Directive: Patient has Advanced Directive on file.

## 2019-01-29 DIAGNOSIS — R10.2 SUPRAPUBIC PRESSURE: ICD-10-CM

## 2019-01-29 NOTE — ASSESSMENT & PLAN NOTE
Stable on current medication regimen, rare use of albuterol.  She was recently seen in urgent care with lower respiratory infection, she improved after course of azithromycin.  She does continue to cough but overall is feeling much better.

## 2019-01-29 NOTE — ASSESSMENT & PLAN NOTE
Last A1c of 6.4.  She has been working on diet and exercise, no medication at this time  No polyuria, polydipsia, numbness or tingling in extremities

## 2019-01-29 NOTE — PROGRESS NOTES
Subjective:     Chief Complaint   Patient presents with   • Follow-Up     Randi Bello is a 78 y.o. female here today to follow up on:    Suprapubic pressure  Pressure starting this morning and continuing through the day today.  She has h/o UTI.  No current dysuria, gross hematuria, fever, nausea    Panlobular emphysema (HCC)  Stable on current medication regimen, rare use of albuterol.  She was recently seen in urgent care with lower respiratory infection, she improved after course of azithromycin.  She does continue to cough but overall is feeling much better.    CKD (chronic kidney disease) stage 3, GFR 30-59 ml/min  Stable with last GFR 43.  Avoiding NSAIDs.    Coronary artery disease involving native coronary artery of native heart without angina pectoris  History of MI more than 20 years ago.  Stable at this point on daily aspirin, Plavix, atorvastatin.  No recent chest pain, activity intolerance    Hypertension  Controlled on current medication.  No chest pain, dizziness, palpitations    Prediabetes  Last A1c of 6.4.  She has been working on diet and exercise, no medication at this time  No polyuria, polydipsia, numbness or tingling in extremities    Chronic fatigue  Continues to have concerns with poor energy level, this is been ongoing for quite some time.  She acknowledges that in the last few weeks her fatigue may be related to her respiratory illness.  She does not feel that she is back to baseline yet.  She is sleeping well, denies any significant snoring.  No frequent waking.  No history of hypothyroidism, anemia     Annual Health Assessment Questions:     1.  Are you currently engaging in any exercise or physical activity? Yes     2.  How would you describe your mood or emotional well-being today? good     3.  Have you had any falls in the last year? No     4.  Have you noticed any problems with your balance or had difficulty walking? No     5.  In the last six months have you experienced any  leakage of urine? Yes     6. DPA/Advanced Directive: Patient has Advanced Directive on file.     Current medicines (including changes today)  Current Outpatient Prescriptions   Medication Sig Dispense Refill   • atorvastatin (LIPITOR) 20 MG Tab TAKE 1 TAB BY MOUTH EVERY DAY. 30 Tab 5   • nitrofurantoin monohydr macro (MACROBID) 100 MG Cap Take 1 Cap by mouth 2 times a day. 6 Cap 0   • azithromycin (ZITHROMAX) 250 MG Tab Take 2 tablets by mouth on day one. Take one tablet by mouth the remaining days until gone 6 Tab 0   • fluticasone (FLOVENT HFA) 110 MCG/ACT Aerosol Inhale 2 Puffs by mouth 2 times a day. 1 Inhaler 3   • hydroCHLOROthiazide (HYDRODIURIL) 25 MG Tab TAKE 1 TAB BY MOUTH EVERY DAY. 90 Tab 1   • fluticasone (FLONASE) 50 MCG/ACT nasal spray USE 2 SPRAYS IN NOSE EVERY DAY. 16 g 5   • clopidogrel (PLAVIX) 75 MG Tab TAKE 1 TAB BY MOUTH EVERY DAY. 90 Tab 3   • omeprazole (PRILOSEC) 40 MG delayed-release capsule Take 1 Cap by mouth every day. 90 Cap 3   • omeprazole (PRILOSEC) 20 MG delayed-release capsule Take 1 Cap by mouth every day. 30 Cap 1   • PROAIR  (90 Base) MCG/ACT Aero Soln inhalation aerosol INHALE 2 PUFFS BY MOUTH EVERY 6 HOURS AS NEEDED FOR SHORTNESS OF BREATH. 8.5 Inhaler 5   • triamcinolone acetonide (KENALOG) 0.1 % Cream APPLY 2 TO 4 GRAMS TO AFFECTED AREA(S) 2 TIMES A DAY. 30 g 0   • aspirin (ASA) 325 MG Tab Take 325 mg by mouth every 6 hours as needed.     • albuterol (VENTOLIN OR PROVENTIL) 108 (90 BASE) MCG/ACT Aero Soln inhalation aerosol Inhale 2 Puffs by mouth every 6 hours as needed for Shortness of Breath. 8.5 g 3   • Ferrous Sulfate (IRON) 325 (65 FE) MG TABS Take 1 Tab by mouth 2 times a day, with meals.     • Naproxen Sodium (ALEVE PO) Take 1 Tab by mouth as needed.       No current facility-administered medications for this visit.      She  has a past medical history of Anesthesia; Anxiety (6/9/2016); Bell palsy (5/14/2015); Chronic fatigue (1/15/2016); CKD (chronic kidney  "disease) stage 3, GFR 30-59 ml/min ([facility]) (3/3/2018); COPD (chronic obstructive pulmonary disease) ([facility]); Dental disorder; Dyslipidemia (1/4/2017); Environmental allergies (7/5/2017); GERD (gastroesophageal reflux disease); Hair thinning (1/15/2016); Hiatus hernia syndrome; History of esophageal cancer; Hypertension (4/1/2015); Myocardial infarct ([facility]) (1996); Pneumonia (2007); Post-menopausal osteoporosis (3/3/2018); Primary osteoarthritis involving multiple joints (12/12/2017); Raynaud's disease (1/15/2016); and Shingles (2014).    ROS included above     Objective:     Blood pressure 134/62, pulse 72, temperature 35.9 °C (96.6 °F), temperature source Temporal, resp. rate 16, height 1.676 m (5' 6\"), weight 78.9 kg (174 lb), SpO2 95 %, not currently breastfeeding. Body mass index is 28.08 kg/m².     Physical Exam:  General: Alert, oriented in no acute distress.  Eye contact is good, speech is normal, affect calm  HEENT: Oral mucosa pink moist, no lesions.  Nares with clear mucus.  TMs gray with good landmarks bilaterally. No lymphadenopathy.  Lungs: clear to auscultation bilaterally, normal effort, no wheeze/ rhonchi/ rales.  CV: regular rate and rhythm, S1, S2.  No pedal edema  Abdomen: soft, nontender, no hepatosplenomegaly  Ext: no edema, color normal, vascularity normal, temperature normal    Assessment and Plan:   The following treatment plan was discussed   1. Suprapubic pressure   suspicious for UTI, she has had UTI in the past and this feels similar.  Start Macrobid, urine sent for culture.  Will follow up with her with results  URINE CULTURE(NEW)    nitrofurantoin monohydr macro (MACROBID) 100 MG Cap    POCT Urinalysis   2. Lower respiratory infection   resolved at this point, continue to monitor   3. Encounter for screening mammogram for breast cancer  MA-SCREEN MAMMO W/CAD-BILAT   4. Chronic fatigue  Ongoing concern of poor energy level despite getting adequate rest, no underlying etiology identified to " date.  Will reevaluate labs will consider echo pending results  TSH WITH REFLEX TO FT4    CBC WITH DIFFERENTIAL    VITAMIN D,25 HYDROXY   5. Essential hypertension   stable  COMP METABOLIC PANEL   6. Dyslipidemia   stable  Lipid Profile   7. CKD (chronic kidney disease) stage 3, GFR 30-59 ml/min (HCC)   stable, continue to monitor   8. Prediabetes   counseled on diet, exercise, weight loss recommendations  HEMOGLOBIN A1C   9. Panlobular emphysema (HCC)   stable at this time   10. Coronary artery disease involving native coronary artery of native heart without angina pectoris   stable, continue medication       Followup: Pending labs         Please note that this dictation was created using voice recognition software. I have worked with consultants from the vendor as well as technical experts from [employer] to optimize the interface. I have made every reasonable attempt to correct obvious errors, but I expect that there are errors of grammar and possibly content that I did not discover before finalizing the note.

## 2019-01-29 NOTE — ASSESSMENT & PLAN NOTE
Continues to have concerns with poor energy level, this is been ongoing for quite some time.  She acknowledges that in the last few weeks her fatigue may be related to her respiratory illness.  She does not feel that she is back to baseline yet.  She is sleeping well, denies any significant snoring.  No frequent waking.  No history of hypothyroidism, anemia

## 2019-01-29 NOTE — ASSESSMENT & PLAN NOTE
History of MI more than 20 years ago.  Stable at this point on daily aspirin, Plavix, atorvastatin.  No recent chest pain, activity intolerance

## 2019-01-31 LAB
BACTERIA UR CULT: ABNORMAL
BACTERIA UR CULT: ABNORMAL
SIGNIFICANT IND 70042: ABNORMAL
SITE SITE: ABNORMAL
SOURCE SOURCE: ABNORMAL

## 2019-02-01 ENCOUNTER — TELEPHONE (OUTPATIENT)
Dept: MEDICAL GROUP | Facility: MEDICAL CENTER | Age: 79
End: 2019-02-01

## 2019-02-02 NOTE — TELEPHONE ENCOUNTER
----- Message from RAUDEL Cazares sent at 1/30/2019 11:04 AM PST -----  Please inform patient we have received the preliminary results on her urine which is showing bacteria.  Final result may be another 1-2 days.  She should continue the antibiotic for now and we will let her know when we receive the final report

## 2019-02-04 ENCOUNTER — PATIENT MESSAGE (OUTPATIENT)
Dept: MEDICAL GROUP | Facility: MEDICAL CENTER | Age: 79
End: 2019-02-04

## 2019-02-04 RX ORDER — SULFAMETHOXAZOLE AND TRIMETHOPRIM 800; 160 MG/1; MG/1
1 TABLET ORAL 2 TIMES DAILY
Qty: 14 TAB | Refills: 0 | Status: SHIPPED | OUTPATIENT
Start: 2019-02-04 | End: 2019-08-14

## 2019-02-05 NOTE — TELEPHONE ENCOUNTER
From: Randi Bello  To: RAUDEL Cazares  Sent: 2/4/2019 9:19 AM PST  Subject: Test Result Question    Orion Nazario, regarding my UTI test, I am still having frequent urination, with pressure. I feel that I should get anti-biotics (Augmentin-?) to try to clean this up. The six pills (Nitrofuranto etc ) didn't seem to do much . Would appreciate feedback. Thanks, Randi Bello

## 2019-02-08 ENCOUNTER — HOSPITAL ENCOUNTER (OUTPATIENT)
Dept: LAB | Facility: MEDICAL CENTER | Age: 79
End: 2019-02-08
Attending: NURSE PRACTITIONER
Payer: MEDICARE

## 2019-02-08 DIAGNOSIS — I10 ESSENTIAL HYPERTENSION: ICD-10-CM

## 2019-02-08 DIAGNOSIS — R53.82 CHRONIC FATIGUE: ICD-10-CM

## 2019-02-08 DIAGNOSIS — R73.03 PREDIABETES: ICD-10-CM

## 2019-02-08 DIAGNOSIS — E78.5 DYSLIPIDEMIA: ICD-10-CM

## 2019-02-08 LAB
25(OH)D3 SERPL-MCNC: 23 NG/ML (ref 30–100)
ALBUMIN SERPL BCP-MCNC: 4.1 G/DL (ref 3.2–4.9)
ALBUMIN/GLOB SERPL: 1.2 G/DL
ALP SERPL-CCNC: 89 U/L (ref 30–99)
ALT SERPL-CCNC: 17 U/L (ref 2–50)
ANION GAP SERPL CALC-SCNC: 6 MMOL/L (ref 0–11.9)
AST SERPL-CCNC: 20 U/L (ref 12–45)
BASOPHILS # BLD AUTO: 0.7 % (ref 0–1.8)
BASOPHILS # BLD: 0.06 K/UL (ref 0–0.12)
BILIRUB SERPL-MCNC: 0.5 MG/DL (ref 0.1–1.5)
BUN SERPL-MCNC: 15 MG/DL (ref 8–22)
CALCIUM SERPL-MCNC: 10.3 MG/DL (ref 8.5–10.5)
CHLORIDE SERPL-SCNC: 104 MMOL/L (ref 96–112)
CHOLEST SERPL-MCNC: 163 MG/DL (ref 100–199)
CO2 SERPL-SCNC: 28 MMOL/L (ref 20–33)
CREAT SERPL-MCNC: 1.22 MG/DL (ref 0.5–1.4)
EOSINOPHIL # BLD AUTO: 0.2 K/UL (ref 0–0.51)
EOSINOPHIL NFR BLD: 2.3 % (ref 0–6.9)
ERYTHROCYTE [DISTWIDTH] IN BLOOD BY AUTOMATED COUNT: 50.3 FL (ref 35.9–50)
EST. AVERAGE GLUCOSE BLD GHB EST-MCNC: 131 MG/DL
GLOBULIN SER CALC-MCNC: 3.4 G/DL (ref 1.9–3.5)
GLUCOSE SERPL-MCNC: 102 MG/DL (ref 65–99)
HBA1C MFR BLD: 6.2 % (ref 0–5.6)
HCT VFR BLD AUTO: 42.9 % (ref 37–47)
HDLC SERPL-MCNC: 37 MG/DL
HGB BLD-MCNC: 13.7 G/DL (ref 12–16)
IMM GRANULOCYTES # BLD AUTO: 0.04 K/UL (ref 0–0.11)
IMM GRANULOCYTES NFR BLD AUTO: 0.5 % (ref 0–0.9)
LDLC SERPL CALC-MCNC: 92 MG/DL
LYMPHOCYTES # BLD AUTO: 3.42 K/UL (ref 1–4.8)
LYMPHOCYTES NFR BLD: 38.5 % (ref 22–41)
MCH RBC QN AUTO: 32.8 PG (ref 27–33)
MCHC RBC AUTO-ENTMCNC: 31.9 G/DL (ref 33.6–35)
MCV RBC AUTO: 102.6 FL (ref 81.4–97.8)
MONOCYTES # BLD AUTO: 0.75 K/UL (ref 0–0.85)
MONOCYTES NFR BLD AUTO: 8.4 % (ref 0–13.4)
NEUTROPHILS # BLD AUTO: 4.41 K/UL (ref 2–7.15)
NEUTROPHILS NFR BLD: 49.6 % (ref 44–72)
NRBC # BLD AUTO: 0 K/UL
NRBC BLD-RTO: 0 /100 WBC
PLATELET # BLD AUTO: 294 K/UL (ref 164–446)
PMV BLD AUTO: 11.2 FL (ref 9–12.9)
POTASSIUM SERPL-SCNC: 3.8 MMOL/L (ref 3.6–5.5)
PROT SERPL-MCNC: 7.5 G/DL (ref 6–8.2)
RBC # BLD AUTO: 4.18 M/UL (ref 4.2–5.4)
SODIUM SERPL-SCNC: 138 MMOL/L (ref 135–145)
TRIGL SERPL-MCNC: 169 MG/DL (ref 0–149)
TSH SERPL DL<=0.005 MIU/L-ACNC: 2.21 UIU/ML (ref 0.38–5.33)
WBC # BLD AUTO: 8.9 K/UL (ref 4.8–10.8)

## 2019-02-08 PROCEDURE — 80053 COMPREHEN METABOLIC PANEL: CPT

## 2019-02-08 PROCEDURE — 84443 ASSAY THYROID STIM HORMONE: CPT

## 2019-02-08 PROCEDURE — 36415 COLL VENOUS BLD VENIPUNCTURE: CPT

## 2019-02-08 PROCEDURE — 83036 HEMOGLOBIN GLYCOSYLATED A1C: CPT

## 2019-02-08 PROCEDURE — 82306 VITAMIN D 25 HYDROXY: CPT

## 2019-02-08 PROCEDURE — 80061 LIPID PANEL: CPT

## 2019-02-08 PROCEDURE — 85025 COMPLETE CBC W/AUTO DIFF WBC: CPT

## 2019-03-11 ENCOUNTER — PATIENT MESSAGE (OUTPATIENT)
Dept: MEDICAL GROUP | Facility: MEDICAL CENTER | Age: 79
End: 2019-03-11

## 2019-03-12 RX ORDER — AMOXICILLIN AND CLAVULANATE POTASSIUM 875; 125 MG/1; MG/1
1 TABLET, FILM COATED ORAL 2 TIMES DAILY
Qty: 10 TAB | Refills: 0 | Status: SHIPPED | OUTPATIENT
Start: 2019-03-12 | End: 2019-08-14

## 2019-03-14 ENCOUNTER — HOSPITAL ENCOUNTER (OUTPATIENT)
Dept: RADIOLOGY | Facility: MEDICAL CENTER | Age: 79
End: 2019-03-14
Attending: NURSE PRACTITIONER
Payer: MEDICARE

## 2019-03-14 DIAGNOSIS — Z12.31 ENCOUNTER FOR SCREENING MAMMOGRAM FOR BREAST CANCER: ICD-10-CM

## 2019-03-14 PROCEDURE — 77063 BREAST TOMOSYNTHESIS BI: CPT

## 2019-04-01 DIAGNOSIS — K21.9 GASTROESOPHAGEAL REFLUX DISEASE WITHOUT ESOPHAGITIS: ICD-10-CM

## 2019-04-01 RX ORDER — OMEPRAZOLE 40 MG/1
40 CAPSULE, DELAYED RELEASE ORAL DAILY
Qty: 90 CAP | Refills: 3 | Status: SHIPPED | OUTPATIENT
Start: 2019-04-01 | End: 2020-03-26 | Stop reason: SDUPTHER

## 2019-04-02 DIAGNOSIS — L30.9 DERMATITIS: ICD-10-CM

## 2019-04-02 RX ORDER — TRIAMCINOLONE ACETONIDE 1 MG/G
CREAM TOPICAL
Qty: 30 G | Refills: 0 | Status: SHIPPED | OUTPATIENT
Start: 2019-04-02 | End: 2020-02-04

## 2019-04-28 DIAGNOSIS — J41.1 MUCOPURULENT CHRONIC BRONCHITIS (HCC): ICD-10-CM

## 2019-04-29 RX ORDER — DEXAMETHASONE 4 MG/1
TABLET ORAL
Qty: 12 INHALER | Refills: 3 | Status: SHIPPED | OUTPATIENT
Start: 2019-04-29 | End: 2019-10-01

## 2019-05-01 RX ORDER — ATORVASTATIN CALCIUM 20 MG/1
20 TABLET, FILM COATED ORAL
Qty: 100 TAB | Refills: 2 | Status: SHIPPED | OUTPATIENT
Start: 2019-05-01 | End: 2021-01-08 | Stop reason: SDUPTHER

## 2019-05-01 NOTE — TELEPHONE ENCOUNTER
JOHANNA REQUESTING 100 DAY SUPPLY, WAS NOT ABLE TO CHANGE QUANTITY      Was the patient seen in the last year in this department? Yes    Does patient have an active prescription for medications requested? No     Received Request Via: Pharmacy

## 2019-08-07 ENCOUNTER — TELEPHONE (OUTPATIENT)
Dept: MEDICAL GROUP | Facility: MEDICAL CENTER | Age: 79
End: 2019-08-07

## 2019-08-07 NOTE — TELEPHONE ENCOUNTER
ESTABLISHED PATIENT PRE-VISIT PLANNING     Patient was NOT contacted to complete PVP.     Note: Patient will not be contacted if there is no indication to call.     1.  Reviewed notes from the last few office visits within the medical group: Yes    2.  If any orders were placed at last visit or intended to be done for this visit (i.e. 6 mos follow-up), do we have Results/Consult Notes?        •  Labs - Labs ordered, completed on 02/08/2019 and results are in chart.   Note: If patient appointment is for lab review and patient did not complete labs, check with provider if OK to reschedule patient until labs completed.       •  Imaging - Imaging ordered, completed and results are in chart.       •  Referrals - No referrals were ordered at last office visit.    3. Is this appointment scheduled as a Hospital Follow-Up? No    4.  Immunizations were updated in Epic using WebIZ?: Epic matches WebIZ       •  Web [employer] Recommendations: PNEUMOVAX (PPSV23), TDAP and SHINGRIX (Shingles)    5.  Patient is due for the following Health Maintenance Topics:   Health Maintenance Due   Topic Date Due   • Annual Wellness Visit  1940   • IMM HEP B VACCINE (1 of 3 - Risk 3-dose series) 07/31/1959   • IMM DTaP/Tdap/Td Vaccine (1 - Tdap) 07/31/1958   • IMM ZOSTER VACCINES (1 of 2) 07/31/1989   • IMM PNEUMOCOCCAL VACCINE: 65+ Years (2 of 2 - PPSV23) 03/07/2019       - Patient has completed PREVNAR (PCV13)  Immunization(s) per WebIZ. Chart has been updated.    6. Orders for overdue Health Maintenance topics pended in Pre-Charting? NO    7.  AHA (MDX) form printed for Provider? No, already completed    8.  Patient was NOT informed to arrive 15 min prior to their scheduled appointment and bring in their medication bottles.

## 2019-08-14 ENCOUNTER — OFFICE VISIT (OUTPATIENT)
Dept: MEDICAL GROUP | Facility: MEDICAL CENTER | Age: 79
End: 2019-08-14
Payer: MEDICARE

## 2019-08-14 VITALS
HEART RATE: 63 BPM | BODY MASS INDEX: 27.8 KG/M2 | SYSTOLIC BLOOD PRESSURE: 110 MMHG | RESPIRATION RATE: 16 BRPM | WEIGHT: 173 LBS | DIASTOLIC BLOOD PRESSURE: 70 MMHG | HEIGHT: 66 IN | TEMPERATURE: 97 F | OXYGEN SATURATION: 91 %

## 2019-08-14 DIAGNOSIS — Z88.9 MULTIPLE ALLERGIES: ICD-10-CM

## 2019-08-14 DIAGNOSIS — R10.10 UPPER ABDOMINAL PAIN: ICD-10-CM

## 2019-08-14 DIAGNOSIS — E78.5 DYSLIPIDEMIA: ICD-10-CM

## 2019-08-14 DIAGNOSIS — Z91.09 ENVIRONMENTAL ALLERGIES: ICD-10-CM

## 2019-08-14 DIAGNOSIS — R73.03 PREDIABETES: ICD-10-CM

## 2019-08-14 DIAGNOSIS — N18.30 CKD (CHRONIC KIDNEY DISEASE) STAGE 3, GFR 30-59 ML/MIN (HCC): ICD-10-CM

## 2019-08-14 DIAGNOSIS — M54.6 ACUTE MIDLINE THORACIC BACK PAIN: ICD-10-CM

## 2019-08-14 PROCEDURE — 99214 OFFICE O/P EST MOD 30 MIN: CPT | Performed by: NURSE PRACTITIONER

## 2019-08-14 RX ORDER — HYDROCODONE BITARTRATE AND ACETAMINOPHEN 5; 325 MG/1; MG/1
1 TABLET ORAL
Qty: 15 TAB | Refills: 0 | Status: SHIPPED | OUTPATIENT
Start: 2019-08-14 | End: 2019-09-13

## 2019-08-14 RX ORDER — MONTELUKAST SODIUM 10 MG/1
10 TABLET ORAL DAILY
Qty: 30 TAB | Refills: 0 | Status: SHIPPED | OUTPATIENT
Start: 2019-08-14 | End: 2019-09-12 | Stop reason: SDUPTHER

## 2019-08-14 NOTE — ASSESSMENT & PLAN NOTE
Patient tells me today that she has occasional flares of mid back pain and muscle spasm occurring either on the right or the left mid back.  No particular trigger, can flareup unexpectedly and typically lasts 1 to 2 days at a time.  No history of injury.  She has tried muscle relaxants in the past and not feel needs to be beneficial.  She is requesting small amount of Norco which she has taken very sparingly with good results.  Her  report shows no recent controlled substance prescriptions.  She does not drive or drink alcohol with medication.  Opiate risk score 0

## 2019-08-14 NOTE — ASSESSMENT & PLAN NOTE
Last a1c 6.2  Watching diet, no medications  Walking dog several times a day  No concerns with polyuria, polydipsia, numbness or tingling in extremities

## 2019-08-14 NOTE — PROGRESS NOTES
Subjective:     Chief Complaint   Patient presents with   • Abdominal Pain     PRESSURE AND PAIN, POSS HERNIA    • Other     HIGHER DOSE OF  FLOVENT INHALER      Randi Bello is a 79 y.o. female here today to follow up on:    Dyslipidemia  Doing well on atorvastatin    Prediabetes  Last a1c 6.2  Watching diet, no medications  Walking dog several times a day  No concerns with polyuria, polydipsia, numbness or tingling in extremities    CKD (chronic kidney disease) stage 3, GFR 30-59 ml/min  Stable with last GFR 42  occ taking aleve, not on a regular basis.  No change in urine output    Environmental allergies  Patient continues to have persistent congestion despite having tried multiple over-the-counter antihistamines including Zyrtec, Allegra, Claritin and she has not found any of these to be very beneficial.  She had also tried Flonase which had no effect on symptoms.  She is interested in other treatment options    Acute midline thoracic back pain  Patient tells me today that she has occasional flares of mid back pain and muscle spasm occurring either on the right or the left mid back.  No particular trigger, can flareup unexpectedly and typically lasts 1 to 2 days at a time.  No history of injury.  She has tried muscle relaxants in the past and not feel needs to be beneficial.  She is requesting small amount of Norco which she has taken very sparingly with good results.  Her  report shows no recent controlled substance prescriptions.  She does not drive or drink alcohol with medication.  Opiate risk score 0     Abdominal concern  Patient feels that she has a central upper abdominal bulge which may be a hernia, has been present for many years but seems to be worsening and causing her more difficulty. Exacerbated by bending over.  No associated nausea, vomiting, fever or chills.  No current pain    Current medicines (including changes today)  Current Outpatient Medications   Medication Sig Dispense Refill    • HYDROcodone-acetaminophen (NORCO) 5-325 MG Tab per tablet Take 1 Tab by mouth 1 time daily as needed for up to 30 days. 15 Tab 0   • montelukast (SINGULAIR) 10 MG Tab Take 1 Tab by mouth every day. 30 Tab 0   • atorvastatin (LIPITOR) 20 MG Tab Take 1 Tab by mouth every day. TAKE 1 TAB BY MOUTH EVERY DAY. 100 Tab 2   • FLOVENT  MCG/ACT Aerosol INHALE 2 PUFFS BY MOUTH TWICE A DAY 12 Inhaler 3   • clopidogrel (PLAVIX) 75 MG Tab TAKE 1 TABLET BY MOUTH EVERY DAY 90 Tab 3   • hydroCHLOROthiazide (HYDRODIURIL) 25 MG Tab TAKE 1 TABLET BY MOUTH EVERY DAY 90 Tab 3   • triamcinolone acetonide (KENALOG) 0.1 % Cream APPLY 2 TO 4 GRAMS TO AFFECTED AREA(S) 2 TIMES A DAY. 30 g 0   • omeprazole (PRILOSEC) 40 MG delayed-release capsule TAKE 1 CAP BY MOUTH EVERY DAY. 90 Cap 3   • nitrofurantoin monohydr macro (MACROBID) 100 MG Cap Take 1 Cap by mouth 2 times a day. 6 Cap 0   • fluticasone (FLONASE) 50 MCG/ACT nasal spray USE 2 SPRAYS IN NOSE EVERY DAY. 16 g 5   • omeprazole (PRILOSEC) 20 MG delayed-release capsule Take 1 Cap by mouth every day. 30 Cap 1   • PROAIR  (90 Base) MCG/ACT Aero Soln inhalation aerosol INHALE 2 PUFFS BY MOUTH EVERY 6 HOURS AS NEEDED FOR SHORTNESS OF BREATH. 8.5 Inhaler 5   • aspirin (ASA) 325 MG Tab Take 325 mg by mouth every 6 hours as needed.     • albuterol (VENTOLIN OR PROVENTIL) 108 (90 BASE) MCG/ACT Aero Soln inhalation aerosol Inhale 2 Puffs by mouth every 6 hours as needed for Shortness of Breath. 8.5 g 3   • Ferrous Sulfate (IRON) 325 (65 FE) MG TABS Take 1 Tab by mouth 2 times a day, with meals.     • Naproxen Sodium (ALEVE PO) Take 1 Tab by mouth as needed.       No current facility-administered medications for this visit.      She  has a past medical history of Anesthesia, Anxiety (6/9/2016), Bell palsy (5/14/2015), Chronic fatigue (1/15/2016), CKD (chronic kidney disease) stage 3, GFR 30-59 ml/min ([facility]) (3/3/2018), COPD (chronic obstructive pulmonary disease) ([facility]), Dental  "disorder, Dyslipidemia (1/4/2017), Environmental allergies (7/5/2017), GERD (gastroesophageal reflux disease), Hair thinning (1/15/2016), Hiatus hernia syndrome, History of esophageal cancer, Hypertension (4/1/2015), Myocardial infarct ([facility]) (1996), Pneumonia (2007), Post-menopausal osteoporosis (3/3/2018), Primary osteoarthritis involving multiple joints (12/12/2017), Raynaud's disease (1/15/2016), and Shingles (2014).    ROS included above     Objective:     /70 (BP Location: Left arm, Patient Position: Sitting, BP Cuff Size: Adult)   Pulse 63   Temp 36.1 °C (97 °F) (Temporal)   Resp 16   Ht 1.676 m (5' 6\")   Wt 78.5 kg (173 lb)   SpO2 91%  Body mass index is 27.92 kg/m².     Physical Exam:  General: Alert, oriented in no acute distress.  Eye contact is good, speech is normal, affect calm  Lungs: clear to auscultation bilaterally, normal effort, no wheeze/ rhonchi/ rales.  CV: regular rate and rhythm, S1, S2, no murmur  Abdomen: soft, nontender, no discrete hernia identified  MS: No point tenderness over spinous process, no obvious deformity.  No current muscle spasm in the mid back, no muscular tenderness.    Ext: no edema, color normal, vascularity normal, temperature normal    Assessment and Plan:   The following treatment plan was discussed   1. Dyslipidemia   stable on current medication, due for labs  Lipid Profile   2. Prediabetes   working on diet and exercise regimen  Comp Metabolic Panel    HEMOGLOBIN A1C   3. CKD (chronic kidney disease) stage 3, GFR 30-59 ml/min ([facility])   stable  Comp Metabolic Panel    MICROALBUMIN CREAT RATIO URINE   4. Upper abdominal pain   no obvious hernia on exam, we will evaluate ultrasound  US-HERNIA ABDOMEN   5. Acute midline thoracic back pain   patient has occasional flares of mid back pain and muscle spasm.  Has tried muscle relaxants in the past without benefit.  She is requesting a small amount of Norco which she has taken in the past with good results.  She " estimates she may need this twice a month or so.  She has to avoid NSAIDs due to her kidney disease.   report reviewed, no concerning findings.  I will provide her with 15 tablets today for sparing use, she understands that she will need appointment for any future refill.  Consent for opiate prescription reviewed and signed, risks discussed  HYDROcodone-acetaminophen (NORCO) 5-325 MG Tab per tablet   6. Multiple allergies   no benefit from multiple over-the-counter medications will.  Will start trial of Singulair  montelukast (SINGULAIR) 10 MG Tab   7. Environmental allergies         Followup: Pending labs         Please note that this dictation was created using voice recognition software. I have worked with consultants from the vendor as well as technical experts from [employer] to optimize the interface. I have made every reasonable attempt to correct obvious errors, but I expect that there are errors of grammar and possibly content that I did not discover before finalizing the note.

## 2019-08-14 NOTE — ASSESSMENT & PLAN NOTE
Patient continues to have persistent congestion despite having tried multiple over-the-counter antihistamines including Zyrtec, Allegra, Claritin and she has not found any of these to be very beneficial.  She had also tried Flonase which had no effect on symptoms.  She is interested in other treatment options

## 2019-09-03 ENCOUNTER — HOSPITAL ENCOUNTER (OUTPATIENT)
Dept: LAB | Facility: MEDICAL CENTER | Age: 79
End: 2019-09-03
Attending: NURSE PRACTITIONER
Payer: MEDICARE

## 2019-09-03 ENCOUNTER — HOSPITAL ENCOUNTER (OUTPATIENT)
Dept: RADIOLOGY | Facility: MEDICAL CENTER | Age: 79
End: 2019-09-03
Attending: NURSE PRACTITIONER
Payer: MEDICARE

## 2019-09-03 DIAGNOSIS — E78.5 DYSLIPIDEMIA: ICD-10-CM

## 2019-09-03 DIAGNOSIS — N18.30 CKD (CHRONIC KIDNEY DISEASE) STAGE 3, GFR 30-59 ML/MIN (HCC): ICD-10-CM

## 2019-09-03 DIAGNOSIS — R10.10 UPPER ABDOMINAL PAIN: ICD-10-CM

## 2019-09-03 DIAGNOSIS — R73.03 PREDIABETES: ICD-10-CM

## 2019-09-03 LAB
ALBUMIN SERPL BCP-MCNC: 4.1 G/DL (ref 3.2–4.9)
ALBUMIN/GLOB SERPL: 1.2 G/DL
ALP SERPL-CCNC: 77 U/L (ref 30–99)
ALT SERPL-CCNC: 13 U/L (ref 2–50)
ANION GAP SERPL CALC-SCNC: 10 MMOL/L (ref 0–11.9)
AST SERPL-CCNC: 16 U/L (ref 12–45)
BILIRUB SERPL-MCNC: 0.4 MG/DL (ref 0.1–1.5)
BUN SERPL-MCNC: 23 MG/DL (ref 8–22)
CALCIUM SERPL-MCNC: 9.5 MG/DL (ref 8.5–10.5)
CHLORIDE SERPL-SCNC: 104 MMOL/L (ref 96–112)
CHOLEST SERPL-MCNC: 154 MG/DL (ref 100–199)
CO2 SERPL-SCNC: 29 MMOL/L (ref 20–33)
CREAT SERPL-MCNC: 1.16 MG/DL (ref 0.5–1.4)
CREAT UR-MCNC: 143.8 MG/DL
FASTING STATUS PATIENT QL REPORTED: NORMAL
GLOBULIN SER CALC-MCNC: 3.5 G/DL (ref 1.9–3.5)
GLUCOSE SERPL-MCNC: 97 MG/DL (ref 65–99)
HDLC SERPL-MCNC: 40 MG/DL
LDLC SERPL CALC-MCNC: 92 MG/DL
MICROALBUMIN UR-MCNC: 6.1 MG/DL
MICROALBUMIN/CREAT UR: 42 MG/G (ref 0–30)
POTASSIUM SERPL-SCNC: 3.7 MMOL/L (ref 3.6–5.5)
PROT SERPL-MCNC: 7.6 G/DL (ref 6–8.2)
SODIUM SERPL-SCNC: 143 MMOL/L (ref 135–145)
TRIGL SERPL-MCNC: 111 MG/DL (ref 0–149)

## 2019-09-03 PROCEDURE — 76705 ECHO EXAM OF ABDOMEN: CPT

## 2019-09-03 PROCEDURE — 83036 HEMOGLOBIN GLYCOSYLATED A1C: CPT

## 2019-09-03 PROCEDURE — 36415 COLL VENOUS BLD VENIPUNCTURE: CPT

## 2019-09-03 PROCEDURE — 80061 LIPID PANEL: CPT

## 2019-09-03 PROCEDURE — 82570 ASSAY OF URINE CREATININE: CPT

## 2019-09-03 PROCEDURE — 80053 COMPREHEN METABOLIC PANEL: CPT

## 2019-09-03 PROCEDURE — 82043 UR ALBUMIN QUANTITATIVE: CPT

## 2019-09-04 LAB
EST. AVERAGE GLUCOSE BLD GHB EST-MCNC: 128 MG/DL
HBA1C MFR BLD: 6.1 % (ref 0–5.6)

## 2019-09-12 DIAGNOSIS — Z88.9 MULTIPLE ALLERGIES: ICD-10-CM

## 2019-09-12 RX ORDER — MONTELUKAST SODIUM 10 MG/1
TABLET ORAL
Qty: 30 TAB | Refills: 11 | Status: SHIPPED | OUTPATIENT
Start: 2019-09-12 | End: 2019-09-26 | Stop reason: SDUPTHER

## 2019-09-26 DIAGNOSIS — Z88.9 MULTIPLE ALLERGIES: ICD-10-CM

## 2019-09-26 RX ORDER — MONTELUKAST SODIUM 10 MG/1
10 TABLET ORAL
Qty: 90 TAB | Refills: 3 | Status: SHIPPED | OUTPATIENT
Start: 2019-09-26 | End: 2019-10-15

## 2019-09-26 NOTE — TELEPHONE ENCOUNTER
Pharmacy requesting 90 day.      Was the patient seen in the last year in this department? Yes    Does patient have an active prescription for medications requested? No     Received Request Via: Pharmacy

## 2019-10-01 ENCOUNTER — PATIENT MESSAGE (OUTPATIENT)
Dept: MEDICAL GROUP | Facility: MEDICAL CENTER | Age: 79
End: 2019-10-01

## 2019-10-01 RX ORDER — FLUTICASONE PROPIONATE 220 UG/1
1 AEROSOL, METERED RESPIRATORY (INHALATION) 2 TIMES DAILY
Qty: 3 INHALER | Refills: 1 | Status: ON HOLD | OUTPATIENT
Start: 2019-10-01 | End: 2020-12-31

## 2019-10-01 NOTE — TELEPHONE ENCOUNTER
"From: Randi Bello  To: RAUDEL Cazares  Sent: 10/1/2019 11:39 AM PDT  Subject: Prescription Question    Hi Mansi, was going to refill \"Provent\" 110 mcg and wondering if I can get the 220 mcg instead. The pharmacy may be contacting you as well. Thank you Randi Bello (Sherri)  "

## 2019-10-15 ENCOUNTER — OFFICE VISIT (OUTPATIENT)
Dept: MEDICAL GROUP | Facility: MEDICAL CENTER | Age: 79
End: 2019-10-15
Payer: MEDICARE

## 2019-10-15 VITALS
DIASTOLIC BLOOD PRESSURE: 68 MMHG | RESPIRATION RATE: 16 BRPM | BODY MASS INDEX: 27.32 KG/M2 | OXYGEN SATURATION: 94 % | HEART RATE: 65 BPM | SYSTOLIC BLOOD PRESSURE: 134 MMHG | HEIGHT: 66 IN | TEMPERATURE: 96.5 F | WEIGHT: 170 LBS

## 2019-10-15 DIAGNOSIS — J01.40 ACUTE NON-RECURRENT PANSINUSITIS: ICD-10-CM

## 2019-10-15 DIAGNOSIS — Z23 NEED FOR VACCINATION: ICD-10-CM

## 2019-10-15 PROCEDURE — 99214 OFFICE O/P EST MOD 30 MIN: CPT | Mod: 25 | Performed by: NURSE PRACTITIONER

## 2019-10-15 PROCEDURE — 90732 PPSV23 VACC 2 YRS+ SUBQ/IM: CPT | Performed by: NURSE PRACTITIONER

## 2019-10-15 PROCEDURE — 90662 IIV NO PRSV INCREASED AG IM: CPT | Performed by: NURSE PRACTITIONER

## 2019-10-15 PROCEDURE — G0008 ADMIN INFLUENZA VIRUS VAC: HCPCS | Performed by: NURSE PRACTITIONER

## 2019-10-15 PROCEDURE — G0009 ADMIN PNEUMOCOCCAL VACCINE: HCPCS | Performed by: NURSE PRACTITIONER

## 2019-10-15 RX ORDER — AMOXICILLIN AND CLAVULANATE POTASSIUM 875; 125 MG/1; MG/1
1 TABLET, FILM COATED ORAL 2 TIMES DAILY
Qty: 20 TAB | Refills: 0 | Status: SHIPPED | OUTPATIENT
Start: 2019-10-15 | End: 2019-11-14

## 2019-10-15 NOTE — PROGRESS NOTES
Subjective:     Chief Complaint   Patient presents with   • Sinus Problem     Randi Bello is a 79 y.o. female established patient here for evaluation of sinus congestion, malaise, headache, mucus.  She has chronic difficulty with environmental allergies, has been using Flonase regularly.  However, she has had a sudden increase in her congestion now with brown-tinged mucus and pain throughout the face.  Energy level is decreased, she is generally not feeling well and has a headache as well as throat irritation.  No nausea, fever, chills, otalgia.  No sick contacts, no recent travel    No problem-specific Assessment & Plan notes found for this encounter.       Current medicines (including changes today)  Current Outpatient Medications   Medication Sig Dispense Refill   • amoxicillin-clavulanate (AUGMENTIN) 875-125 MG Tab Take 1 Tab by mouth 2 times a day. 20 Tab 0   • fluticasone (FLOVENT HFA) 220 MCG/ACT Aerosol Inhale 1 Puff by mouth 2 times a day. 3 Inhaler 1   • atorvastatin (LIPITOR) 20 MG Tab Take 1 Tab by mouth every day. TAKE 1 TAB BY MOUTH EVERY DAY. 100 Tab 2   • clopidogrel (PLAVIX) 75 MG Tab TAKE 1 TABLET BY MOUTH EVERY DAY 90 Tab 3   • hydroCHLOROthiazide (HYDRODIURIL) 25 MG Tab TAKE 1 TABLET BY MOUTH EVERY DAY 90 Tab 3   • triamcinolone acetonide (KENALOG) 0.1 % Cream APPLY 2 TO 4 GRAMS TO AFFECTED AREA(S) 2 TIMES A DAY. 30 g 0   • omeprazole (PRILOSEC) 40 MG delayed-release capsule TAKE 1 CAP BY MOUTH EVERY DAY. 90 Cap 3   • nitrofurantoin monohydr macro (MACROBID) 100 MG Cap Take 1 Cap by mouth 2 times a day. 6 Cap 0   • fluticasone (FLONASE) 50 MCG/ACT nasal spray USE 2 SPRAYS IN NOSE EVERY DAY. 16 g 5   • PROAIR  (90 Base) MCG/ACT Aero Soln inhalation aerosol INHALE 2 PUFFS BY MOUTH EVERY 6 HOURS AS NEEDED FOR SHORTNESS OF BREATH. 8.5 Inhaler 5   • aspirin (ASA) 325 MG Tab Take 325 mg by mouth every 6 hours as needed.     • albuterol (VENTOLIN OR PROVENTIL) 108 (90 BASE) MCG/ACT Aero  "Soln inhalation aerosol Inhale 2 Puffs by mouth every 6 hours as needed for Shortness of Breath. 8.5 g 3   • Ferrous Sulfate (IRON) 325 (65 FE) MG TABS Take 1 Tab by mouth 2 times a day, with meals.     • Naproxen Sodium (ALEVE PO) Take 1 Tab by mouth as needed.       No current facility-administered medications for this visit.      She  has a past medical history of Anesthesia, Anxiety (6/9/2016), Bell palsy (5/14/2015), Chronic fatigue (1/15/2016), CKD (chronic kidney disease) stage 3, GFR 30-59 ml/min ([facility]) (3/3/2018), COPD (chronic obstructive pulmonary disease) ([facility]), Dental disorder, Dyslipidemia (1/4/2017), Environmental allergies (7/5/2017), GERD (gastroesophageal reflux disease), Hair thinning (1/15/2016), Hiatus hernia syndrome, History of esophageal cancer, Hypertension (4/1/2015), Myocardial infarct ([facility]) (1996), Pneumonia (2007), Post-menopausal osteoporosis (3/3/2018), Primary osteoarthritis involving multiple joints (12/12/2017), Raynaud's disease (1/15/2016), and Shingles (2014).    ROS included above     Objective:     /68 (BP Location: Left arm, Patient Position: Sitting, BP Cuff Size: Adult)   Pulse 65   Temp 35.8 °C (96.5 °F) (Temporal)   Resp 16   Ht 1.676 m (5' 6\")   Wt 77.1 kg (170 lb)   SpO2 94%  Body mass index is 27.44 kg/m².     Physical Exam:  General: Alert, oriented in no acute distress.  Eye contact is good, speech is normal, affect calm  HEENT: Posterior oropharynx with mild erythema, drainage present. TMs gray with good landmarks bilaterally.  Pain over bilateral maxillary sinuses.  No lymphadenopathy.  Lungs: clear to auscultation bilaterally, normal effort, no wheeze/ rhonchi/ rales.  CV: regular rate and rhythm, S1, S2, no murmur  Ext: no edema, color normal, vascularity normal, temperature normal    Assessment and Plan:   The following treatment plan was discussed   1. Acute non-recurrent pansinusitis   concern for developing sinusitis.  Start Augmentin, advised " over-the-counter Mucinex, continue Flonase and nasal irrigation.  Encourage probiotic.  Follow-up if not gradually resolving  amoxicillin-clavulanate (AUGMENTIN) 875-125 MG Tab   2. Need for vaccination  I have placed the below orders and discussed them with an approved delegating provider. The MA is performing the below orders under the direction of Dr. Riggs  Pneumococal Polysaccharide Vaccine 23-Valent =>1YO SQ/IM    Influenza Vaccine, High Dose (65+ Only)       Followup: 1 week if no improvement, sooner as needed         Please note that this dictation was created using voice recognition software. I have worked with consultants from the vendor as well as technical experts from [facility] [employer] to optimize the interface. I have made every reasonable attempt to correct obvious errors, but I expect that there are errors of grammar and possibly content that I did not discover before finalizing the note.

## 2019-10-22 ENCOUNTER — PATIENT MESSAGE (OUTPATIENT)
Dept: MEDICAL GROUP | Facility: MEDICAL CENTER | Age: 79
End: 2019-10-22

## 2019-10-22 NOTE — TELEPHONE ENCOUNTER
From: Randi Bello  To: RAUDEL Cazares  Sent: 10/22/2019 11:24 AM PDT  Subject: Non-Urgent Medical Question    Mansi, Re: my sinus infection, the antibiotics are definitely working, but I am experiencing a lot of weakness and can't quite get any energy back. Hoping there might be something like Vit B 12 shot that would help. I am eating healthy but don't know what else to do. Any recommendations ?  Thanks, Kelly Bello (Sharon)

## 2019-10-28 ENCOUNTER — PATIENT MESSAGE (OUTPATIENT)
Dept: MEDICAL GROUP | Facility: MEDICAL CENTER | Age: 79
End: 2019-10-28

## 2019-10-29 ENCOUNTER — PATIENT MESSAGE (OUTPATIENT)
Dept: MEDICAL GROUP | Facility: MEDICAL CENTER | Age: 79
End: 2019-10-29

## 2019-10-29 NOTE — TELEPHONE ENCOUNTER
From: Randi Bello  To: RAUDEL Cazares  Sent: 10/28/2019 4:06 PM PDT  Subject: Non-Urgent Medical Question    Hi Mansi, am sure you are getting tired of my ?'s. but am going on two weeks tomorrow with this sinus infection. I have had heavy coughing spells and a lot of congestion, which brings me to my next concern. I have developed a pain in my left breast in a specific area. No lump, just a pain when I press that area at times. Could this be connected to a form of Pleurisy OMG I guess I have reached the age of Hypochondria. lol. I am so used to rebounding when I get these things. Any help you can give me would be appreciated. Thank You Randi Bello (Sherri)

## 2019-10-29 NOTE — TELEPHONE ENCOUNTER
"From: Randi Bello  To: RAUDEL Cazares  Sent: 10/29/2019 10:35 AM PDT  Subject: Non-Urgent Medical Question    Sorry I didn't get back to you sooner. Our power was out all morning in [place]. No fever or shortness of breath, and actually this morning the pain has lightened up. Just can't get my energy back, so guess \"patience\" is my prescription, lol.  Thanks for getting back to me, Kelly  ----- Message -----  From: RAUDEL Cazares  Sent: 10/29/2019 7:20 AM PDT  To: Randi Bello  Subject: RE: Non-Urgent Medical Question  Orion Mendoza,  Are you having any fevers or shortness of breath?  Sometimes patients can develop costochondritis from prolonged coughing. This is inflammation in the cartilage of the rib cage and will often cause a spot that is tender to touch. This generally gets better with anti-inflammatory medications, like Aleve or ibuprofen. However, if your pain is severe or you are having fever, fatigue, or shortness of breath I would like to see you again for an exam. Please let me know,  Monique ARRIETA            ----- Message -----   From: Randi Bello   Sent: 10/28/2019 4:06 PM PDT   To: RAUDEL Cazares  Subject: Non-Urgent Medical Question    Orion Nazario, am sure you are getting tired of my ?'s. but am going on two weeks tomorrow with this sinus infection. I have had heavy coughing spells and a lot of congestion, which brings me to my next concern. I have developed a pain in my left breast in a specific area. No lump, just a pain when I press that area at times. Could this be connected to a form of Pleurisy OMG I guess I have reached the age of Hypochondria. lol. I am so used to rebounding when I get these things. Any help you can give me would be appreciated. Thank You Randi Bello (Sherri)    "

## 2019-11-12 ENCOUNTER — TELEPHONE (OUTPATIENT)
Dept: MEDICAL GROUP | Facility: MEDICAL CENTER | Age: 79
End: 2019-11-12

## 2019-11-13 SDOH — HEALTH STABILITY: MENTAL HEALTH: HOW MANY STANDARD DRINKS CONTAINING ALCOHOL DO YOU HAVE ON A TYPICAL DAY?: 1 OR 2

## 2019-11-13 SDOH — HEALTH STABILITY: MENTAL HEALTH: HOW OFTEN DO YOU HAVE A DRINK CONTAINING ALCOHOL?: 2-4 TIMES A MONTH

## 2019-11-13 SDOH — HEALTH STABILITY: MENTAL HEALTH: HOW OFTEN DO YOU HAVE 6 OR MORE DRINKS ON ONE OCCASION?: NEVER

## 2019-11-13 NOTE — TELEPHONE ENCOUNTER
Future Appointments       Provider Department Center    11/14/2019 3:20 PM RAUDEL Cazares; [facility]          ANNUAL WELLNESS VISIT PRE-VISIT PLANNING WITHOUT OUTREACH    1.  Reviewed note from last office visit with PCP: YES    2.  If any orders were placed at last visit, do we have Results/Consult Notes?        •  Labs - Labs were not ordered at last office visit.  Note: If patient appointment is for lab review and patient did not complete labs, check with provider if OK to reschedule patient until labs completed.       •  Imaging - Imaging was not ordered at last office visit.       •  Referrals - No referrals were ordered at last office visit.    3.  Immunizations were updated in Epic using WebIZ?: Epic matches WebIZ       •  WebIZ Recommendations: TDAP, SHINGRIX (Shingles) and CPOX        •  Is patient due for Tdap? YES. Patient was not notified of copay/out of pocket cost.       •  Is patient due for Shingrix? YES. Patient was not notified of copay/out of pocket cost.     4.  Patient is due for the following Health Maintenance Topics:   Health Maintenance Due   Topic Date Due   • IMM DTaP/Tdap/Td Vaccine (1 - Tdap) 07/31/1951   • PFT SCREENING-FEV1 AND FEV/FVC RATIO / SPIROMETRY SHOULD BE PERFORMED ANNUALLY  07/31/1957   • IMM ZOSTER VACCINES (1 of 2) 07/31/1989   • Annual Wellness Visit  03/08/2019     5.  Reviewed/Updated the following with patient:       •   Preferred Pharmacy? YES       •   Preferred Lab? YES       •   Preferred Communication? YES       •   Allergies? YES       •   Medications? YES. Was Abstract Encounter opened and chart updated? YES       •   Social History? YES. Was Abstract Encounter opened and chart updated? YES       •   Family History (document living status of immediate family members and if + hx of  cancer, diabetes, hypertension, hyperlipidemia, heart attack, stroke) YES. Was Abstract Encounter  opened and chart updated? YES    6.  Care Team Updated:       •   DME Company (gait device, O2, CPAP, etc.): NO       •   Other Specialists (eye doctor, derm, GYN, cardiology, endo, etc): NO    7. Orders for overdue Health Maintenance topics pended in Pre-Charting? N\A    8.  Patient has the following Care Path diagnoses on Problem List:  NONE    9.  Patient was advised: “This is a free wellness visit. The provider will screen for medical conditions to help you stay healthy. If you have other concerns to address you may be asked to discuss these at a separate visit or there may be an additional fee.”     10.  Patient was informed to arrive 15 min prior to their scheduled appointment and bring in their medication bottles.

## 2019-11-14 ENCOUNTER — OFFICE VISIT (OUTPATIENT)
Dept: MEDICAL GROUP | Facility: MEDICAL CENTER | Age: 79
End: 2019-11-14
Payer: MEDICARE

## 2019-11-14 VITALS
HEIGHT: 66 IN | HEART RATE: 63 BPM | TEMPERATURE: 97.4 F | DIASTOLIC BLOOD PRESSURE: 76 MMHG | SYSTOLIC BLOOD PRESSURE: 134 MMHG | BODY MASS INDEX: 27.64 KG/M2 | WEIGHT: 172 LBS

## 2019-11-14 DIAGNOSIS — I10 ESSENTIAL HYPERTENSION: ICD-10-CM

## 2019-11-14 DIAGNOSIS — Z00.00 MEDICARE ANNUAL WELLNESS VISIT, SUBSEQUENT: ICD-10-CM

## 2019-11-14 DIAGNOSIS — R73.03 PREDIABETES: ICD-10-CM

## 2019-11-14 DIAGNOSIS — E78.5 DYSLIPIDEMIA: ICD-10-CM

## 2019-11-14 DIAGNOSIS — M54.6 ACUTE MIDLINE THORACIC BACK PAIN: ICD-10-CM

## 2019-11-14 DIAGNOSIS — Z13.29 THYROID DISORDER SCREEN: ICD-10-CM

## 2019-11-14 DIAGNOSIS — R06.02 SHORTNESS OF BREATH ON EXERTION: ICD-10-CM

## 2019-11-14 DIAGNOSIS — M15.9 PRIMARY OSTEOARTHRITIS INVOLVING MULTIPLE JOINTS: ICD-10-CM

## 2019-11-14 DIAGNOSIS — N18.30 CKD (CHRONIC KIDNEY DISEASE) STAGE 3, GFR 30-59 ML/MIN (HCC): ICD-10-CM

## 2019-11-14 DIAGNOSIS — I73.00 RAYNAUD'S DISEASE WITHOUT GANGRENE: ICD-10-CM

## 2019-11-14 DIAGNOSIS — J43.1 PANLOBULAR EMPHYSEMA (HCC): ICD-10-CM

## 2019-11-14 DIAGNOSIS — K21.9 GASTROESOPHAGEAL REFLUX DISEASE WITHOUT ESOPHAGITIS: ICD-10-CM

## 2019-11-14 DIAGNOSIS — I25.10 CORONARY ARTERY DISEASE INVOLVING NATIVE CORONARY ARTERY OF NATIVE HEART WITHOUT ANGINA PECTORIS: ICD-10-CM

## 2019-11-14 PROCEDURE — G0439 PPPS, SUBSEQ VISIT: HCPCS | Performed by: NURSE PRACTITIONER

## 2019-11-14 PROCEDURE — 99214 OFFICE O/P EST MOD 30 MIN: CPT | Mod: 25 | Performed by: NURSE PRACTITIONER

## 2019-11-14 RX ORDER — HYDROCODONE BITARTRATE AND ACETAMINOPHEN 5; 325 MG/1; MG/1
1-2 TABLET ORAL EVERY 8 HOURS PRN
Qty: 60 TAB | Refills: 0 | Status: SHIPPED | OUTPATIENT
Start: 2019-11-14 | End: 2019-12-05 | Stop reason: SDUPTHER

## 2019-11-14 RX ORDER — ALBUTEROL SULFATE 90 UG/1
2 AEROSOL, METERED RESPIRATORY (INHALATION) EVERY 6 HOURS PRN
Qty: 8.5 G | Refills: 3 | Status: SHIPPED | OUTPATIENT
Start: 2019-11-14

## 2019-11-14 ASSESSMENT — PATIENT HEALTH QUESTIONNAIRE - PHQ9: CLINICAL INTERPRETATION OF PHQ2 SCORE: 0

## 2019-11-14 ASSESSMENT — ENCOUNTER SYMPTOMS: GENERAL WELL-BEING: GOOD

## 2019-11-14 ASSESSMENT — ACTIVITIES OF DAILY LIVING (ADL): BATHING_REQUIRES_ASSISTANCE: 0

## 2019-11-14 NOTE — PROGRESS NOTES
Chief Complaint   Patient presents with   • Annual Wellness Visit         HPI:  Randi is a 79 y.o. here for Medicare Annual Wellness Visit and with new concerns    Panlobular emphysema (HCC), shortness of breath on exertion  Chronic issue, uncontrolled.  She states that she is feeling terrible recently.  She is frequently short of breath, has to stop and rest when walking.  No longer has the endurance that she is used to which is become frustrating for her.  She would like to be able to play golf but has not been up to this.  She was recently treated for sinusitis and feels that she has not fully rebounded from this illness although her congestion and sinus pressure and improved.  She is having some cough, largely unproductive. No fever, chills, increased sputum production, orthopnea, diaphoresis, chest pain  Her emphysema has been managed with Flovent, she has not used albuterol recently.  Last PFT 2018:  INTERPRETATION:  1.  FEV1 is 1.71 liters, which is 81% of predicted.  The FEV1/FVC ratio is   normal at 78%.  MVV is reduced at 62% of predicted.  2.  There is no response to an inhaled bronchodilator.  3.  Total lung capacity is 86% of predicted.  4.  Gas exchange as estimated by DLCO is reduced at 67% of predicted.  5.  Airway resistance is normal.    Last Echo 2015:  CONCLUSIONS  Agitated saline (bubble) study was performed; No bubbles were observed   crossing over to the left heart chambers to indicated the presence of a   intracardiac shunt.  Normal left ventricular systolic function.  Left ventricular ejection fraction is 60% to 65%.  Moderate left ventricular hypertrophy.    Hypertension  Blood pressure stable and adequately controlled on hydrochlorothiazide.  No chest pain, palpitation    Raynaud's disease  Stable, no current treatment    Gastroesophageal reflux disease without esophagitis  Symptoms remain stable on omeprazole 40 mg daily    Dyslipidemia  Controlled with atorvastatin, tolerating  medication without difficulty    Primary osteoarthritis involving multiple joints  Using over-the-counter Tylenol, Norco for more significant pain.  She is in need of refill for Norco today which she uses sparingly.  Last prescription filled approximately 2 months ago with quantity of 15    CKD (chronic kidney disease) stage 3, GFR 30-59 ml/min  Kidney function remained stable, she is avoiding NSAIDs.  No change in urine output, dark urine, hematuria  Component      Latest Ref Rng & Units 10/17/2018 2/8/2019 9/3/2019           7:04 AM  7:18 AM  7:46 AM   GFR If African American      >60 mL/min/1.73 m 2 52 (A) 51 (A) 54 (A)   GFR If Non African American      >60 mL/min/1.73 m 2 43 (A) 42 (A) 45 (A)       Prediabetes  Stable with last A1c of 6.1, no medications for this.  She continues with dietary efforts    Acute midline thoracic back pain  Gradual worsening of her mid back pain over the last few months, can bother her during the day or when lying in bed.  She is having occasional muscle spasms.  She has found some relief with use of Norco during flares, needing refill today.  I have reviewed her  report which shows no aberrant behavior.  She does not drive or drink alcohol with medication.  We have discussed imaging of the spine, she would like to hold off and deal with her shortness of breath first        Patient Active Problem List    Diagnosis Date Noted   • Acute midline thoracic back pain 08/14/2019   • Suprapubic pressure 01/28/2019   • Panlobular emphysema (HCC) 01/28/2019   • Coronary artery disease involving native coronary artery of native heart without angina pectoris 01/28/2019   • Prediabetes 01/28/2019   • Elevated fasting glucose 03/07/2018   • Post-menopausal osteoporosis 03/03/2018   • CKD (chronic kidney disease) stage 3, GFR 30-59 ml/min (HCC) 03/03/2018   • History of esophageal cancer    • Dental disorder    • Primary osteoarthritis involving multiple joints 12/12/2017   • Environmental  allergies 07/05/2017   • Dyslipidemia 01/04/2017   • Gastroesophageal reflux disease without esophagitis 06/09/2016   • Raynaud's disease 01/15/2016   • Chronic fatigue 01/15/2016   • History of Bell's palsy 05/14/2015   • Hypertension 04/01/2015   • History of MI (myocardial infarction) 01/29/2015       Current Outpatient Medications   Medication Sig Dispense Refill   • fluticasone (FLOVENT HFA) 220 MCG/ACT Aerosol Inhale 1 Puff by mouth 2 times a day. 3 Inhaler 1   • atorvastatin (LIPITOR) 20 MG Tab Take 1 Tab by mouth every day. TAKE 1 TAB BY MOUTH EVERY DAY. 100 Tab 2   • clopidogrel (PLAVIX) 75 MG Tab TAKE 1 TABLET BY MOUTH EVERY DAY 90 Tab 3   • hydroCHLOROthiazide (HYDRODIURIL) 25 MG Tab TAKE 1 TABLET BY MOUTH EVERY DAY 90 Tab 3   • triamcinolone acetonide (KENALOG) 0.1 % Cream APPLY 2 TO 4 GRAMS TO AFFECTED AREA(S) 2 TIMES A DAY. 30 g 0   • omeprazole (PRILOSEC) 40 MG delayed-release capsule TAKE 1 CAP BY MOUTH EVERY DAY. 90 Cap 3   • fluticasone (FLONASE) 50 MCG/ACT nasal spray USE 2 SPRAYS IN NOSE EVERY DAY. 16 g 5   • PROAIR  (90 Base) MCG/ACT Aero Soln inhalation aerosol INHALE 2 PUFFS BY MOUTH EVERY 6 HOURS AS NEEDED FOR SHORTNESS OF BREATH. 8.5 Inhaler 5   • aspirin (ASA) 325 MG Tab Take 325 mg by mouth every 6 hours as needed.     • albuterol (VENTOLIN OR PROVENTIL) 108 (90 BASE) MCG/ACT Aero Soln inhalation aerosol Inhale 2 Puffs by mouth every 6 hours as needed for Shortness of Breath. 8.5 g 3   • Naproxen Sodium (ALEVE PO) Take 1 Tab by mouth as needed.     • amoxicillin-clavulanate (AUGMENTIN) 875-125 MG Tab Take 1 Tab by mouth 2 times a day. (Patient not taking: Reported on 11/13/2019) 20 Tab 0   • nitrofurantoin monohydr macro (MACROBID) 100 MG Cap Take 1 Cap by mouth 2 times a day. (Patient not taking: Reported on 11/13/2019) 6 Cap 0   • Ferrous Sulfate (IRON) 325 (65 FE) MG TABS Take 1 Tab by mouth 2 times a day, with meals.       No current facility-administered medications for this  visit.         Patient is taking medications as noted in medication list.  Current supplements as per medication list.     Allergies: Doxycycline    Current social contact/activities: Patient reports golfing, walking her dog 3-4 times per day, visiting family, going to kids sport games     Is patient current with immunizations? No, due for TDAP and SHINGRIX (Shingles). Patient is interested in receiving NONE today.    She  reports that she quit smoking about 23 years ago. Her smoking use included cigarettes. She has a 50.00 pack-year smoking history. She has never used smokeless tobacco. She reports current alcohol use. She reports that she does not use drugs.  Counseling given: Not Answered        DPA/Advanced directive: Patient has Advanced Directive, but it is not on file. Instructed to bring in a copy to scan into their chart.    ROS:    Gait: Uses no assistive device   Ostomy: No   Other tubes: No   Amputations: No   Chronic oxygen use No   Last eye exam Patient reports over 1 year ago   Wears hearing aids: No   : Denies any urinary leakage during the last 6 months      Depression Screening    Little interest or pleasure in doing things?  0 - not at all  Feeling down, depressed, or hopeless? 0 - not at all  Patient Health Questionnaire Score: 0    If depressive symptoms identified deferred to follow up visit unless specifically addressed in assessment and plan.    Interpretation of PHQ-9 Total Score   Score Severity   1-4 No Depression   5-9 Mild Depression   10-14 Moderate Depression   15-19 Moderately Severe Depression   20-27 Severe Depression    Screening for Cognitive Impairment    Three Minute Recall (village, kitchen, baby)  2/3    Sotero clock face with all 12 numbers and set the hands to show 10 past 10.  Yes 5/5  If cognitive concerns identified, deferred for follow up unless specifically addressed in assessment and plan.    Fall Risk Assessment    Has the patient had two or more falls in the last  year or any fall with injury in the last year?  No  If fall risk identified, deferred for follow up unless specifically addressed in assessment and plan.    Safety Assessment    Throw rugs on floor.  Yes  Handrails on all stairs.  Yes  Good lighting in all hallways.  Yes  Difficulty hearing.  Yes  Patient counseled about all safety risks that were identified.    Functional Assessment ADLs    Are there any barriers preventing you from cooking for yourself or meeting nutritional needs?  No.    Are there any barriers preventing you from driving safely or obtaining transportation?  No.    Are there any barriers preventing you from using a telephone or calling for help?  No.    Are there any barriers preventing you from shopping?  No.    Are there any barriers preventing you from taking care of your own finances?  No.    Are there any barriers preventing you from managing your medications?  No.    Are there any barriers preventing you from showering, bathing or dressing yourself?  No.    Are you currently engaging in any exercise or physical activity?  Yes.  Patient reports a lot of walking   What is your perception of your health?  Good.    Health Maintenance Summary                IMM DTaP/Tdap/Td Vaccine Overdue 7/31/1950     PFT SCREENING-FEV1 AND FEV/FVC RATIO / SPIROMETRY SHOULD BE PERFORMED ANNUALLY Overdue 7/31/1957     IMM ZOSTER VACCINES Overdue 7/31/1989     Annual Wellness Visit Overdue 3/8/2019      Done 3/7/2018 Visit Dx: Medicare annual wellness visit, subsequent     Patient has more history with this topic...    BONE DENSITY Next Due 6/30/2021      Done 6/30/2016 DS-BONE DENSITY STUDY (DEXA)          Patient Care Team:  RAUDEL Cazares as PCP - General (Family Medicine)  Rk Gauthier M.D. as Consulting Physician (Cardiology)  Jose Art M.D. as Consulting Physician (Gastroenterology)  Justice Byrd M.D. as Consulting Physician (Gastroenterology)    Social History     Tobacco Use   •  Smoking status: Former Smoker     Packs/day: 2.00     Years: 25.00     Pack years: 50.00     Types: Cigarettes     Last attempt to quit: 1996     Years since quittin.4   • Smokeless tobacco: Never Used   Substance Use Topics   • Alcohol use: Yes     Alcohol/week: 0.0 oz     Frequency: 2-4 times a month     Drinks per session: 1 or 2     Binge frequency: Never     Comment: once a week   • Drug use: No     Family History   Problem Relation Age of Onset   • Cancer Father         lung, smoker   • Cancer Brother         kidney, liver   • Arthritis Sister         Rheumatoid Arthritis     She  has a past medical history of Anesthesia, Anxiety (2016), Bell palsy (2015), Chronic fatigue (1/15/2016), CKD (chronic kidney disease) stage 3, GFR 30-59 ml/min ([facility]) (3/3/2018), COPD (chronic obstructive pulmonary disease) ([facility]), Dental disorder, Dyslipidemia (2017), Environmental allergies (2017), GERD (gastroesophageal reflux disease), Hair thinning (1/15/2016), Hiatus hernia syndrome, History of esophageal cancer, Hypertension (2015), Myocardial infarct ([facility]) (), Pneumonia (), Post-menopausal osteoporosis (3/3/2018), Primary osteoarthritis involving multiple joints (2017), Raynaud's disease (1/15/2016), and Shingles ().   Past Surgical History:   Procedure Laterality Date   • CATARACT PHACO WITH IOL Right 11/15/2016    Procedure: CATARACT PHACO WITH IOL;  Surgeon: Gian Bruno M.D.;  Location: SUR[facility];  Service:    • CATARACT PHACO WITH IOL Left 2016    Procedure: CATARACT PHACO WITH IOL;  Surgeon: Gian Bruno M.D.;  Location: SURGERY SURGICAL [facility];  Service:    • CAROTID ENDARTERECTOMY  2011    Performed by JB MIN at SURGERY [facility] ORS   • VEIN LIGATION RADIO FREQUENCY  2011    Performed by JB MIN at SURGERY [facility] ORS   • OTHER      matri stem   • OTHER      EGD to remove esophageal polyp   •  "OTHER CARDIAC SURGERY  1996    angioplasty   • APPENDECTOMY  1957    appendectomy           Exam:     /76   Pulse 63   Temp 36.3 °C (97.4 °F) (Temporal)   Ht 1.676 m (5' 6\")   Wt 78 kg (172 lb)  Body mass index is 27.76 kg/m².    Hearing good.    Dentition good  Alert, oriented in no acute distress.  Eye contact is good, speech goal directed, affect calm  Heart: Regular rate and rhythm S1-S2, no gallop, no rub  Lungs: Clear and equal with good aeration, slight wheeze, normal effort.  No rhonchi or rails  Abdomen: Soft, nontender  Extremities: No pedal edema    Assessment and Plan. The following treatment and monitoring plan is recommended:   1. Medicare annual wellness visit, subsequent  Problem list and medications reviewed and updated, acute concerns addressed as detailed.  Preventative health measures reviewed    2. Shortness of breath on exertion  New concern with shortness of breath on exertion over the last several weeks.  She was recently treated for sinusitis, and reports improvement in the congestion and sinus pressure.  I have reviewed prior PFT and echocardiogram.  We will update the echo, check chest x-ray, labs as listed below.  I do think that she needs reevaluation with pulmonology and I will start a referral for this  - DX-CHEST-2 VIEWS; Future  - EC-ECHOCARDIOGRAM COMPLETE W/O CONT; Future  - CBC WITH DIFFERENTIAL; Future  - Comp Metabolic Panel; Future    3. Acute midline thoracic back pain  Increase in bilateral mid back pain recently.  Discussed obtaining imaging, patient would like to first address #2.  We will continue with as needed use of Norco.  I have reviewed the  report and determine this prescription to be medically necessary.  Consent for opiate medication reviewed and signed  - HYDROcodone-acetaminophen (NORCO) 5-325 MG Tab per tablet; Take 1-2 Tabs by mouth every 8 hours as needed for up to 30 days.  Dispense: 60 Tab; Refill: 0    4. Panlobular emphysema (HCC)  I suspect " that this is uncontrolled on her current medication regimen of Flovent.  She has not been using albuterol, I have encouraged her to try this more frequently.  Referral placed to pulmonology  - albuterol 108 (90 Base) MCG/ACT Aero Soln inhalation aerosol; Inhale 2 Puffs by mouth every 6 hours as needed for Shortness of Breath.  Dispense: 8.5 g; Refill: 3    5. Essential hypertension  Stable    6. Raynaud's disease without gangrene  Stable    7. Gastroesophageal reflux disease without esophagitis  Stable    8. Dyslipidemia  Stable    9. Primary osteoarthritis involving multiple joints  Stable    10. CKD (chronic kidney disease) stage 3, GFR 30-59 ml/min ([facility])  Continue avoidance of NSAIDs    11. Prediabetes  Continue dietary efforts    12. Thyroid disorder screen  - TSH WITH REFLEX TO FT4; Future    Services suggested: No services needed at this time  Health Care Screening recommendations as per orders if indicated.  Referrals offered: PT/OT/Nutrition counseling/Behavioral Health/Smoking cessation as per orders if indicated.    Discussion today about general wellness and lifestyle habits:    · Prevent falls and reduce trip hazards; Cautioned about securing or removing rugs.  · Have a working fire alarm and carbon monoxide detector;   · Engage in regular physical activity and social activities       Follow-up: pending tests

## 2019-11-14 NOTE — ASSESSMENT & PLAN NOTE
Chronic issue managed with flovent twice daily. She does not currently have albuterol at home.  She is feeling more more short of breath with exertion over the last few months, easily fatigued.  She has not seen pulmonology in recent years.  She was recently treated for sinus infection, feels that she is still not fully rebounded from this.  She does have cough which is largely unproductive

## 2019-11-15 NOTE — ASSESSMENT & PLAN NOTE
Using over-the-counter Tylenol, Norco for more significant pain.  She is in need of refill for Norco today which she uses sparingly.  Last prescription filled approximately 2 months ago with quantity of 15

## 2019-11-15 NOTE — ASSESSMENT & PLAN NOTE
Gradual worsening of her mid back pain over the last few months, can bother her during the day or when lying in bed.  She is having occasional muscle spasms.  She has found some relief with use of Norco during flares, needing refill today.  I have reviewed her  report which shows no aberrant behavior.  She does not drive or drink alcohol with medication.  We have discussed imaging of the spine, she would like to hold off and deal with her shortness of breath first

## 2019-11-15 NOTE — ASSESSMENT & PLAN NOTE
Blood pressure stable and adequately controlled on hydrochlorothiazide.  No chest pain, palpitation

## 2019-11-15 NOTE — ASSESSMENT & PLAN NOTE
Kidney function remained stable, she is avoiding NSAIDs.  No change in urine output, dark urine, hematuria  Component      Latest Ref Rng & Units 10/17/2018 2/8/2019 9/3/2019           7:04 AM  7:18 AM  7:46 AM   GFR If African American      >60 mL/min/1.73 m 2 52 (A) 51 (A) 54 (A)   GFR If Non African American      >60 mL/min/1.73 m 2 43 (A) 42 (A) 45 (A)

## 2019-12-05 ENCOUNTER — PATIENT MESSAGE (OUTPATIENT)
Dept: MEDICAL GROUP | Facility: MEDICAL CENTER | Age: 79
End: 2019-12-05

## 2019-12-05 DIAGNOSIS — M54.6 ACUTE MIDLINE THORACIC BACK PAIN: ICD-10-CM

## 2019-12-05 RX ORDER — HYDROCODONE BITARTRATE AND ACETAMINOPHEN 5; 325 MG/1; MG/1
1-2 TABLET ORAL EVERY 8 HOURS PRN
Qty: 60 TAB | Refills: 0 | Status: SHIPPED | OUTPATIENT
Start: 2019-12-05 | End: 2020-01-04

## 2019-12-05 NOTE — TELEPHONE ENCOUNTER
From: Randi Bello  To: RAUDEL Cazares  Sent: 2019 8:38 AM PST  Subject: Prescription Question    Orion Nazario, sorry to bother you. the presc you gave me for hydrocodone (Norco) , which I didn't realize had an expiration. I retained the original but didn't know if I should bring it in and re sign a new one. Naturally I waited until I actually needed it, to refill. Please let me know what to do. Michel Bello (Sharon)

## 2019-12-06 ENCOUNTER — HOSPITAL ENCOUNTER (OUTPATIENT)
Dept: CARDIOLOGY | Facility: MEDICAL CENTER | Age: 79
End: 2019-12-06
Attending: NURSE PRACTITIONER
Payer: MEDICARE

## 2019-12-06 DIAGNOSIS — R06.02 SHORTNESS OF BREATH ON EXERTION: ICD-10-CM

## 2019-12-06 LAB
LV EJECT FRACT  99904: 65
LV EJECT FRACT MOD 2C 99903: 68.28
LV EJECT FRACT MOD 4C 99902: 65.49
LV EJECT FRACT MOD BP 99901: 65.59

## 2019-12-06 PROCEDURE — 93306 TTE W/DOPPLER COMPLETE: CPT

## 2019-12-06 PROCEDURE — 93306 TTE W/DOPPLER COMPLETE: CPT | Mod: 26 | Performed by: INTERNAL MEDICINE

## 2020-01-15 ENCOUNTER — OFFICE VISIT (OUTPATIENT)
Dept: MEDICAL GROUP | Facility: MEDICAL CENTER | Age: 80
End: 2020-01-15
Payer: MEDICARE

## 2020-01-15 ENCOUNTER — HOSPITAL ENCOUNTER (OUTPATIENT)
Facility: MEDICAL CENTER | Age: 80
End: 2020-01-15
Attending: NURSE PRACTITIONER
Payer: MEDICARE

## 2020-01-15 VITALS
OXYGEN SATURATION: 93 % | WEIGHT: 172 LBS | HEART RATE: 87 BPM | DIASTOLIC BLOOD PRESSURE: 52 MMHG | HEIGHT: 66 IN | BODY MASS INDEX: 27.64 KG/M2 | SYSTOLIC BLOOD PRESSURE: 126 MMHG | RESPIRATION RATE: 16 BRPM | TEMPERATURE: 97.8 F

## 2020-01-15 DIAGNOSIS — N18.30 CKD (CHRONIC KIDNEY DISEASE) STAGE 3, GFR 30-59 ML/MIN: ICD-10-CM

## 2020-01-15 DIAGNOSIS — R30.0 DYSURIA: ICD-10-CM

## 2020-01-15 DIAGNOSIS — J43.1 PANLOBULAR EMPHYSEMA (HCC): ICD-10-CM

## 2020-01-15 DIAGNOSIS — R04.0 EPISTAXIS: ICD-10-CM

## 2020-01-15 LAB
APPEARANCE UR: CLEAR
BILIRUB UR STRIP-MCNC: NEGATIVE MG/DL
COLOR UR AUTO: YELLOW
GLUCOSE UR STRIP.AUTO-MCNC: NEGATIVE MG/DL
KETONES UR STRIP.AUTO-MCNC: NEGATIVE MG/DL
LEUKOCYTE ESTERASE UR QL STRIP.AUTO: NORMAL
NITRITE UR QL STRIP.AUTO: NEGATIVE
PH UR STRIP.AUTO: 7 [PH] (ref 5–8)
PROT UR QL STRIP: NEGATIVE MG/DL
RBC UR QL AUTO: NEGATIVE
SP GR UR STRIP.AUTO: 1.01
UROBILINOGEN UR STRIP-MCNC: 0.2 MG/DL

## 2020-01-15 PROCEDURE — 99214 OFFICE O/P EST MOD 30 MIN: CPT | Performed by: NURSE PRACTITIONER

## 2020-01-15 PROCEDURE — 87186 SC STD MICRODIL/AGAR DIL: CPT

## 2020-01-15 PROCEDURE — 81002 URINALYSIS NONAUTO W/O SCOPE: CPT | Performed by: NURSE PRACTITIONER

## 2020-01-15 PROCEDURE — 87086 URINE CULTURE/COLONY COUNT: CPT

## 2020-01-15 PROCEDURE — 87077 CULTURE AEROBIC IDENTIFY: CPT

## 2020-01-15 PROCEDURE — 8041 PR SCP AHA: Performed by: NURSE PRACTITIONER

## 2020-01-15 RX ORDER — NITROFURANTOIN 25; 75 MG/1; MG/1
100 CAPSULE ORAL 2 TIMES DAILY
Qty: 10 CAP | Refills: 0 | Status: SHIPPED | OUTPATIENT
Start: 2020-01-15 | End: 2020-03-13

## 2020-01-15 ASSESSMENT — PATIENT HEALTH QUESTIONNAIRE - PHQ9: CLINICAL INTERPRETATION OF PHQ2 SCORE: 0

## 2020-01-16 NOTE — ASSESSMENT & PLAN NOTE
Patient reports 3 episodes of nosebleed recently, taking up to an hour to resolve.  At one point she states she was so concerned she nearly went to the ER.  She is on daily Plavix and tells me today that she had been taking ibuprofen also for her back, she has since stopped.  No nasal pain, history of trauma, no bruising or other bleeding

## 2020-01-16 NOTE — ASSESSMENT & PLAN NOTE
Stable with most recent GFR of 45.  She has been advised to avoid NSAIDs although she has been using them recently for her back.  No change in urine output, no dark urine

## 2020-01-16 NOTE — ASSESSMENT & PLAN NOTE
Burning with urination and increased frequency starting yesterday.  She has started increasing her fluid intake but does not feel that it is improving.  She does have history of UTI, last episode a year ago.  No fever, chills, nausea, back pain, hematuria

## 2020-01-16 NOTE — PROGRESS NOTES
Subjective:     Chief Complaint   Patient presents with   • Epistaxis   • UTI     Randi Bello is a 79 y.o. female here today to follow up on:    Panlobular emphysema (HCC)  Chronic issue managed with Flovent twice daily, no recent flares.  Denies shortness of breath, increase in cough or sputum production    CKD (chronic kidney disease) stage 3, GFR 30-59 ml/min  Stable with most recent GFR of 45.  She has been advised to avoid NSAIDs although she has been using them recently for her back.  No change in urine output, no dark urine    Epistaxis  Patient reports 3 episodes of nosebleed recently, taking up to an hour to resolve.  At one point she states she was so concerned she nearly went to the ER.  She is on daily Plavix and tells me today that she had been taking ibuprofen also for her back, she has since stopped.  No nasal pain, history of trauma, no bruising or other bleeding    Dysuria  Burning with urination and increased frequency starting yesterday.  She has started increasing her fluid intake but does not feel that it is improving.  She does have history of UTI, last episode a year ago.  No fever, chills, nausea, back pain, hematuria       Current medicines (including changes today)  Current Outpatient Medications   Medication Sig Dispense Refill   • nitrofurantoin monohyd macro (MACROBID) 100 MG Cap Take 1 Cap by mouth 2 times a day. 10 Cap 0   • albuterol 108 (90 Base) MCG/ACT Aero Soln inhalation aerosol Inhale 2 Puffs by mouth every 6 hours as needed for Shortness of Breath. 8.5 g 3   • fluticasone (FLOVENT HFA) 220 MCG/ACT Aerosol Inhale 1 Puff by mouth 2 times a day. 3 Inhaler 1   • atorvastatin (LIPITOR) 20 MG Tab Take 1 Tab by mouth every day. TAKE 1 TAB BY MOUTH EVERY DAY. 100 Tab 2   • clopidogrel (PLAVIX) 75 MG Tab TAKE 1 TABLET BY MOUTH EVERY DAY 90 Tab 3   • hydroCHLOROthiazide (HYDRODIURIL) 25 MG Tab TAKE 1 TABLET BY MOUTH EVERY DAY 90 Tab 3   • triamcinolone acetonide (KENALOG) 0.1 %  "Cream APPLY 2 TO 4 GRAMS TO AFFECTED AREA(S) 2 TIMES A DAY. 30 g 0   • omeprazole (PRILOSEC) 40 MG delayed-release capsule TAKE 1 CAP BY MOUTH EVERY DAY. 90 Cap 3   • fluticasone (FLONASE) 50 MCG/ACT nasal spray USE 2 SPRAYS IN NOSE EVERY DAY. 16 g 5   • PROAIR  (90 Base) MCG/ACT Aero Soln inhalation aerosol INHALE 2 PUFFS BY MOUTH EVERY 6 HOURS AS NEEDED FOR SHORTNESS OF BREATH. 8.5 Inhaler 5   • aspirin (ASA) 325 MG Tab Take 325 mg by mouth every 6 hours as needed.     • Ferrous Sulfate (IRON) 325 (65 FE) MG TABS Take 1 Tab by mouth 2 times a day, with meals.     • Naproxen Sodium (ALEVE PO) Take 1 Tab by mouth as needed.       No current facility-administered medications for this visit.      She  has a past medical history of Anesthesia, Anxiety (6/9/2016), Bell palsy (5/14/2015), Chronic fatigue (1/15/2016), CKD (chronic kidney disease) stage 3, GFR 30-59 ml/min ([facility]) (3/3/2018), COPD (chronic obstructive pulmonary disease) ([facility]), Dental disorder, Dyslipidemia (1/4/2017), Environmental allergies (7/5/2017), GERD (gastroesophageal reflux disease), Hair thinning (1/15/2016), Hiatus hernia syndrome, History of esophageal cancer, Hypertension (4/1/2015), Myocardial infarct ([facility]) (1996), Pneumonia (2007), Post-menopausal osteoporosis (3/3/2018), Primary osteoarthritis involving multiple joints (12/12/2017), Raynaud's disease (1/15/2016), and Shingles (2014).    ROS included above     Objective:     /52 (BP Location: Left arm, Patient Position: Sitting, BP Cuff Size: Adult)   Pulse 87   Temp 36.6 °C (97.8 °F) (Temporal)   Resp 16   Ht 1.676 m (5' 6\")   Wt 78 kg (172 lb)   SpO2 93%  Body mass index is 27.76 kg/m².     Physical Exam:  General: Alert, oriented in no acute distress.  Eye contact is good, speech is normal, affect calm  HEENT: No nasal sores or superficial blood vessel appreciated in bilateral naris.  Lungs: clear to auscultation bilaterally, normal effort, no wheeze/ rhonchi/ rales.  CV: " regular rate and rhythm, S1, S2  Abdomen: soft, nontender  Ext: no edema, color normal, vascularity normal, temperature normal    Assessment and Plan:   The following treatment plan was discussed  1. Dysuria   will start Macrobid for suspected UTI, culture sent.  Encouraged to continue to push fluids  URINE CULTURE(NEW)    POCT Urinalysis    nitrofurantoin monohyd macro (MACROBID) 100 MG Cap   2. Panlobular emphysema (HCC)   stable at this time, continue to monitor   3. CKD (chronic kidney disease) stage 3, GFR 30-59 ml/min (HCC)   stable and most recent labs.  Advised to avoid NSAIDs   4. Epistaxis   3 episodes of nosebleed after having used ibuprofen in conjunction with her Plavix.  Advised to avoid ibuprofen and NSAIDs in general.  Nasal moisturization discussed.  If she has another episode we will consider referral to ENT for possible cauterization   AHA form completed  Followup: Pending lab       Please note that this dictation was created using voice recognition software. I have worked with consultants from the vendor as well as technical experts from [employer] to optimize the interface. I have made every reasonable attempt to correct obvious errors, but I expect that there are errors of grammar and possibly content that I did not discover before finalizing the note.

## 2020-01-16 NOTE — ASSESSMENT & PLAN NOTE
Chronic issue managed with Flovent twice daily, no recent flares.  Denies shortness of breath, increase in cough or sputum production

## 2020-02-03 DIAGNOSIS — L30.9 DERMATITIS: ICD-10-CM

## 2020-02-04 DIAGNOSIS — L30.9 DERMATITIS: ICD-10-CM

## 2020-02-04 RX ORDER — TRIAMCINOLONE ACETONIDE 1 MG/G
CREAM TOPICAL
Qty: 454 G | Refills: 0 | Status: SHIPPED | OUTPATIENT
Start: 2020-02-04 | End: 2020-06-15 | Stop reason: SDUPTHER

## 2020-02-04 RX ORDER — TRIAMCINOLONE ACETONIDE 1 MG/G
CREAM TOPICAL
Qty: 30 G | Refills: 0 | Status: SHIPPED | OUTPATIENT
Start: 2020-02-04 | End: 2020-02-04 | Stop reason: SDUPTHER

## 2020-02-14 ENCOUNTER — OFFICE VISIT (OUTPATIENT)
Dept: MEDICAL GROUP | Facility: MEDICAL CENTER | Age: 80
End: 2020-02-14
Payer: MEDICARE

## 2020-02-14 VITALS
BODY MASS INDEX: 27.42 KG/M2 | DIASTOLIC BLOOD PRESSURE: 60 MMHG | OXYGEN SATURATION: 96 % | TEMPERATURE: 97.1 F | RESPIRATION RATE: 16 BRPM | HEIGHT: 66 IN | WEIGHT: 170.64 LBS | HEART RATE: 81 BPM | SYSTOLIC BLOOD PRESSURE: 132 MMHG

## 2020-02-14 DIAGNOSIS — F41.8 SITUATIONAL ANXIETY: ICD-10-CM

## 2020-02-14 PROCEDURE — 99214 OFFICE O/P EST MOD 30 MIN: CPT | Performed by: NURSE PRACTITIONER

## 2020-02-14 RX ORDER — BUPROPION HYDROCHLORIDE 150 MG/1
150 TABLET ORAL EVERY MORNING
Qty: 30 TAB | Refills: 2 | Status: SHIPPED | OUTPATIENT
Start: 2020-02-14 | End: 2021-01-08

## 2020-02-14 RX ORDER — HYDROXYZINE HYDROCHLORIDE 25 MG/1
25 TABLET, FILM COATED ORAL 3 TIMES DAILY PRN
Qty: 30 TAB | Refills: 0 | Status: SHIPPED | OUTPATIENT
Start: 2020-02-14 | End: 2020-03-13

## 2020-02-14 ASSESSMENT — PATIENT HEALTH QUESTIONNAIRE - PHQ9
6. FEELING BAD ABOUT YOURSELF - OR THAT YOU ARE A FAILURE OR HAVE LET YOURSELF OR YOUR FAMILY DOWN: 0
SUM OF ALL RESPONSES TO PHQ QUESTIONS 1-9: 7
SUM OF ALL RESPONSES TO PHQ9 QUESTIONS 1 AND 2: 0
8. MOVING OR SPEAKING SO SLOWLY THAT OTHER PEOPLE COULD HAVE NOTICED. OR THE OPPOSITE, BEING SO FIGETY OR RESTLESS THAT YOU HAVE BEEN MOVING AROUND A LOT MORE THAN USUAL: 2
1. LITTLE INTEREST OR PLEASURE IN DOING THINGS: 0
5. POOR APPETITE OR OVEREATING: 0
4. FEELING TIRED OR HAVING LITTLE ENERGY: 0
7. TROUBLE CONCENTRATING ON THINGS, SUCH AS READING THE NEWSPAPER OR WATCHING TELEVISION: 2
2. FEELING DOWN, DEPRESSED, IRRITABLE, OR HOPELESS: 0
9. THOUGHTS THAT YOU WOULD BE BETTER OFF DEAD, OR OF HURTING YOURSELF: 0
3. TROUBLE FALLING OR STAYING ASLEEP OR SLEEPING TOO MUCH: 3

## 2020-02-14 ASSESSMENT — ANXIETY QUESTIONNAIRES
7. FEELING AFRAID AS IF SOMETHING AWFUL MIGHT HAPPEN: NOT AT ALL
1. FEELING NERVOUS, ANXIOUS, OR ON EDGE: NEARLY EVERY DAY
5. BEING SO RESTLESS THAT IT IS HARD TO SIT STILL: MORE THAN HALF THE DAYS
4. TROUBLE RELAXING: MORE THAN HALF THE DAYS
3. WORRYING TOO MUCH ABOUT DIFFERENT THINGS: MORE THAN HALF THE DAYS
6. BECOMING EASILY ANNOYED OR IRRITABLE: NOT AT ALL
GAD7 TOTAL SCORE: 11
2. NOT BEING ABLE TO STOP OR CONTROL WORRYING: MORE THAN HALF THE DAYS

## 2020-02-14 NOTE — ASSESSMENT & PLAN NOTE
Here today to discuss increase in anxiety, poor sleep, nervousness.  Patient reports that she is feeling very stressed, currently having conflict with a neighbor who is making her very uncomfortable at home.  She is feeling worried much of the time and having difficulty relaxing.  She reports that she had had difficulty with anxiety when going through separation with her , at that time she had taken Wellbutrin and found it to be very helpful for her.  She has been off for the last several years but is interested in restarting at this time  No significant depression, no SI/HI, no history of manic behavior  Patient Health Questionaire    Little interest or pleasure in doing things?: 0   Feeling down, depressed, or hopeless?: 0  Trouble falling or staying asleep, or sleeping too much? : 3  Feeling tired or having little energy? : 0  Poor appetite or overeating? : 0  Feeling bad about yourself - or that you are a failure or have let yourself or your family down? : 0  Trouble concentrating on things, such as reading the newspaper or watching television? : 2  Moving or speaking so slowly that other people could have noticed.  Or the opposite - being so fidgety or restless that you have been moving around alot more than usual. : 2  Thoughts that you would be better off dead, or of hurting yourself?: 0  Patient Health Questionnaire Score: 7    If depressive symptoms identified deferred to follow up visit unless specifically addressed in assesment and plan.    Interpretation of PHQ-9 Total Score   Score Severity   1-4 No Depression   5-9 Mild Depression   10-14 Moderate Depression   15-19 Moderately Severe Depression   20-27 Severe Depression    TERI-7 Questionnaire    Feeling nervous, anxious, or on edge: Nearly every day  Not being able to sop or control worrying: More than half the days  Worrying too much about different things: More than half the days  Trouble relaxing: More than half the days  Being so restless  that it's hard to sit still: More than half the days  Becoming easily annoyed or irritable: Not at all  Feeling afraid as if something awful might happen: Not at all  Total: 11    Interpretation of TERI 7 Total Score   Score Severity :  0-4 No Anxiety   5-9 Mild Anxiety  10-14 Moderate Anxiety  15-21 Severe Anxiety

## 2020-02-14 NOTE — PROGRESS NOTES
Subjective:     Chief Complaint   Patient presents with   • Anxiety     Randi Bello is a 79 y.o. female here today to follow up on:    Situational anxiety  Here today to discuss increase in anxiety, poor sleep, nervousness.  Patient reports that she is feeling very stressed, currently having conflict with a neighbor who is making her very uncomfortable at home.  She is feeling worried much of the time and having difficulty relaxing.  She reports that she had had difficulty with anxiety when going through separation with her , at that time she had taken Wellbutrin and found it to be very helpful for her.  She has been off for the last several years but is interested in restarting at this time  No significant depression, no SI/HI, no history of manic behavior  Patient Health Questionaire    Little interest or pleasure in doing things?: 0   Feeling down, depressed, or hopeless?: 0  Trouble falling or staying asleep, or sleeping too much? : 3  Feeling tired or having little energy? : 0  Poor appetite or overeating? : 0  Feeling bad about yourself - or that you are a failure or have let yourself or your family down? : 0  Trouble concentrating on things, such as reading the newspaper or watching television? : 2  Moving or speaking so slowly that other people could have noticed.  Or the opposite - being so fidgety or restless that you have been moving around alot more than usual. : 2  Thoughts that you would be better off dead, or of hurting yourself?: 0  Patient Health Questionnaire Score: 7    If depressive symptoms identified deferred to follow up visit unless specifically addressed in assesment and plan.    Interpretation of PHQ-9 Total Score   Score Severity   1-4 No Depression   5-9 Mild Depression   10-14 Moderate Depression   15-19 Moderately Severe Depression   20-27 Severe Depression    TERI-7 Questionnaire    Feeling nervous, anxious, or on edge: Nearly every day  Not being able to sop or control  worrying: More than half the days  Worrying too much about different things: More than half the days  Trouble relaxing: More than half the days  Being so restless that it's hard to sit still: More than half the days  Becoming easily annoyed or irritable: Not at all  Feeling afraid as if something awful might happen: Not at all  Total: 11    Interpretation of TERI 7 Total Score   Score Severity :  0-4 No Anxiety   5-9 Mild Anxiety  10-14 Moderate Anxiety  15-21 Severe Anxiety           Current medicines (including changes today)  Current Outpatient Medications   Medication Sig Dispense Refill   • buPROPion (WELLBUTRIN XL) 150 MG XL tablet Take 1 Tab by mouth every morning. 30 Tab 2   • hydrOXYzine HCl (ATARAX) 25 MG Tab Take 1 Tab by mouth 3 times a day as needed for Anxiety. 30 Tab 0   • triamcinolone acetonide (KENALOG) 0.1 % Cream Apply 2-4 grams to lower back or left lower ankle twice daily as needed 454 g 0   • albuterol 108 (90 Base) MCG/ACT Aero Soln inhalation aerosol Inhale 2 Puffs by mouth every 6 hours as needed for Shortness of Breath. 8.5 g 3   • fluticasone (FLOVENT HFA) 220 MCG/ACT Aerosol Inhale 1 Puff by mouth 2 times a day. 3 Inhaler 1   • atorvastatin (LIPITOR) 20 MG Tab Take 1 Tab by mouth every day. TAKE 1 TAB BY MOUTH EVERY DAY. 100 Tab 2   • clopidogrel (PLAVIX) 75 MG Tab TAKE 1 TABLET BY MOUTH EVERY DAY 90 Tab 3   • hydroCHLOROthiazide (HYDRODIURIL) 25 MG Tab TAKE 1 TABLET BY MOUTH EVERY DAY 90 Tab 3   • omeprazole (PRILOSEC) 40 MG delayed-release capsule TAKE 1 CAP BY MOUTH EVERY DAY. 90 Cap 3   • fluticasone (FLONASE) 50 MCG/ACT nasal spray USE 2 SPRAYS IN NOSE EVERY DAY. 16 g 5   • PROAIR  (90 Base) MCG/ACT Aero Soln inhalation aerosol INHALE 2 PUFFS BY MOUTH EVERY 6 HOURS AS NEEDED FOR SHORTNESS OF BREATH. 8.5 Inhaler 5   • aspirin (ASA) 325 MG Tab Take 325 mg by mouth every 6 hours as needed.     • Ferrous Sulfate (IRON) 325 (65 FE) MG TABS Take 1 Tab by mouth 2 times a day, with  "meals.     • Naproxen Sodium (ALEVE PO) Take 1 Tab by mouth as needed.     • nitrofurantoin monohyd macro (MACROBID) 100 MG Cap Take 1 Cap by mouth 2 times a day. (Patient not taking: Reported on 2/14/2020) 10 Cap 0     No current facility-administered medications for this visit.      She  has a past medical history of Anesthesia, Anxiety (6/9/2016), Bell palsy (5/14/2015), Chronic fatigue (1/15/2016), CKD (chronic kidney disease) stage 3, GFR 30-59 ml/min ([facility]) (3/3/2018), COPD (chronic obstructive pulmonary disease) ([facility]), Dental disorder, Dyslipidemia (1/4/2017), Environmental allergies (7/5/2017), GERD (gastroesophageal reflux disease), Hair thinning (1/15/2016), Hiatus hernia syndrome, History of esophageal cancer, Hypertension (4/1/2015), Myocardial infarct ([facility]) (1996), Pneumonia (2007), Post-menopausal osteoporosis (3/3/2018), Primary osteoarthritis involving multiple joints (12/12/2017), Raynaud's disease (1/15/2016), and Shingles (2014).    ROS included above     Objective:     /60 (BP Location: Left arm, Patient Position: Sitting, BP Cuff Size: Adult)   Pulse 81   Temp 36.2 °C (97.1 °F) (Temporal)   Resp 16   Ht 1.676 m (5' 6\")   Wt 77.4 kg (170 lb 10.2 oz)   SpO2 96%  Body mass index is 27.54 kg/m².     Physical Exam:  General: Alert, oriented in no acute distress.  Eye contact is good, speech is normal, affect calm  Lungs: clear to auscultation bilaterally, normal effort, no wheeze/ rhonchi/ rales.  CV: regular rate and rhythm, S1, S2  Ext: no edema, color normal, vascularity normal, temperature normal    Assessment and Plan:   The following treatment plan was discussed   1. Situational anxiety   currently dealing with conflict with a neighbor which is causing her to feel very nervous and on edge.  She had taken Wellbutrin several years ago and feels that this worked well for her anxiety, she would like to restart.  Risks and side effects reviewed, discussed need for sustained therapy to " obtain results.  I will also provide her with hydroxyzine for as needed use as we wait for the Wellbutrin to kick in.  buPROPion (WELLBUTRIN XL) 150 MG XL tablet    hydrOXYzine HCl (ATARAX) 25 MG Tab       Followup: 4 weeks         Please note that this dictation was created using voice recognition software. I have worked with consultants from the vendor as well as technical experts from [facility] to optimize the interface. I have made every reasonable attempt to correct obvious errors, but I expect that there are errors of grammar and possibly content that I did not discover before finalizing the note.

## 2020-02-25 ENCOUNTER — PATIENT MESSAGE (OUTPATIENT)
Dept: HEALTH INFORMATION MANAGEMENT | Facility: OTHER | Age: 80
End: 2020-02-25

## 2020-02-25 ENCOUNTER — PATIENT OUTREACH (OUTPATIENT)
Dept: HEALTH INFORMATION MANAGEMENT | Facility: OTHER | Age: 80
End: 2020-02-25

## 2020-02-25 SDOH — ECONOMIC STABILITY: TRANSPORTATION INSECURITY
IN THE PAST 12 MONTHS, HAS THE LACK OF TRANSPORTATION KEPT YOU FROM MEDICAL APPOINTMENTS OR FROM GETTING MEDICATIONS?: NO

## 2020-02-25 SDOH — ECONOMIC STABILITY: FOOD INSECURITY: WITHIN THE PAST 12 MONTHS, YOU WORRIED THAT YOUR FOOD WOULD RUN OUT BEFORE YOU GOT MONEY TO BUY MORE.: NEVER TRUE

## 2020-02-25 SDOH — ECONOMIC STABILITY: FOOD INSECURITY: WITHIN THE PAST 12 MONTHS, THE FOOD YOU BOUGHT JUST DIDN'T LAST AND YOU DIDN'T HAVE MONEY TO GET MORE.: NEVER TRUE

## 2020-02-25 SDOH — ECONOMIC STABILITY: TRANSPORTATION INSECURITY
IN THE PAST 12 MONTHS, HAS LACK OF TRANSPORTATION KEPT YOU FROM MEETINGS, WORK, OR FROM GETTING THINGS NEEDED FOR DAILY LIVING?: NO

## 2020-02-25 NOTE — PROGRESS NOTES
1. HealthConnect Verified: yes    2. Verify PCP: yes    3. Review and add  to Care Team: yes      5. Reviewed/Updated the following with patient:       •   Communication Preference Obtained? YES  • MyChart Activation: already active       •   E-Mail Address Obtained? YES       •   Appointment Day and Time Preferences? YES       •   Preferred Pharmacy? YES       •   Preferred Lab? YES    6. Care Gap Scheduling (Attempt to Schedule EACH Overdue Care Gap!)    AWV not due at this time    Patient is requesting I look into another opthalmology or retinal specialist for her to see.

## 2020-02-27 NOTE — PROGRESS NOTES
I called and left message requesting a call back in regards to an Ophthalmology DR. I verified that [facility] [phone] on [address] is taking new [facility] patients and a referral is not needed.

## 2020-03-04 NOTE — PROGRESS NOTES
Patient called me back and I gave her [facility] phone number to schedule appointment.

## 2020-03-13 ENCOUNTER — OFFICE VISIT (OUTPATIENT)
Dept: MEDICAL GROUP | Facility: MEDICAL CENTER | Age: 80
End: 2020-03-13
Payer: MEDICARE

## 2020-03-13 VITALS
DIASTOLIC BLOOD PRESSURE: 68 MMHG | SYSTOLIC BLOOD PRESSURE: 110 MMHG | BODY MASS INDEX: 27 KG/M2 | HEART RATE: 64 BPM | TEMPERATURE: 97 F | RESPIRATION RATE: 12 BRPM | OXYGEN SATURATION: 95 % | HEIGHT: 66 IN | WEIGHT: 168 LBS

## 2020-03-13 DIAGNOSIS — F41.8 SITUATIONAL ANXIETY: ICD-10-CM

## 2020-03-13 PROCEDURE — 99213 OFFICE O/P EST LOW 20 MIN: CPT | Performed by: NURSE PRACTITIONER

## 2020-03-13 ASSESSMENT — ANXIETY QUESTIONNAIRES
2. NOT BEING ABLE TO STOP OR CONTROL WORRYING: NOT AT ALL
7. FEELING AFRAID AS IF SOMETHING AWFUL MIGHT HAPPEN: NOT AT ALL
5. BEING SO RESTLESS THAT IT IS HARD TO SIT STILL: NOT AT ALL
1. FEELING NERVOUS, ANXIOUS, OR ON EDGE: SEVERAL DAYS
GAD7 TOTAL SCORE: 1
3. WORRYING TOO MUCH ABOUT DIFFERENT THINGS: NOT AT ALL
4. TROUBLE RELAXING: NOT AT ALL
6. BECOMING EASILY ANNOYED OR IRRITABLE: NOT AT ALL

## 2020-03-13 ASSESSMENT — PATIENT HEALTH QUESTIONNAIRE - PHQ9
SUM OF ALL RESPONSES TO PHQ9 QUESTIONS 1 AND 2: 0
SUM OF ALL RESPONSES TO PHQ QUESTIONS 1-9: 1
7. TROUBLE CONCENTRATING ON THINGS, SUCH AS READING THE NEWSPAPER OR WATCHING TELEVISION: 0
4. FEELING TIRED OR HAVING LITTLE ENERGY: 0
2. FEELING DOWN, DEPRESSED, IRRITABLE, OR HOPELESS: 0
5. POOR APPETITE OR OVEREATING: 0
9. THOUGHTS THAT YOU WOULD BE BETTER OFF DEAD, OR OF HURTING YOURSELF: 0
1. LITTLE INTEREST OR PLEASURE IN DOING THINGS: 0
6. FEELING BAD ABOUT YOURSELF - OR THAT YOU ARE A FAILURE OR HAVE LET YOURSELF OR YOUR FAMILY DOWN: 0
3. TROUBLE FALLING OR STAYING ASLEEP OR SLEEPING TOO MUCH: 1
8. MOVING OR SPEAKING SO SLOWLY THAT OTHER PEOPLE COULD HAVE NOTICED. OR THE OPPOSITE, BEING SO FIGETY OR RESTLESS THAT YOU HAVE BEEN MOVING AROUND A LOT MORE THAN USUAL: 0

## 2020-03-13 ASSESSMENT — FIBROSIS 4 INDEX: FIB4 SCORE: 1.21

## 2020-03-13 NOTE — PROGRESS NOTES
Subjective:     Chief Complaint   Patient presents with   • Follow-Up     Anxiety     Randi Bello is a 79 y.o. female here today to follow up on:    Situational anxiety  Randi is here today to follow-up on new prescription for bupropion which she tells me is working very well.  Overall she is handling her stress much better, feeling more relaxed, less anxious.  Sleeping better.  The situation with her neighbor has been improving.  No side effects related to medication use including increased blood pressure, headaches, nausea, diarrhea  Patient Health Questionaire    Little interest or pleasure in doing things?: 0   Feeling down, depressed, or hopeless?: 0  Trouble falling or staying asleep, or sleeping too much? : 1  Feeling tired or having little energy? : 0  Poor appetite or overeating? : 0  Feeling bad about yourself - or that you are a failure or have let yourself or your family down? : 0  Trouble concentrating on things, such as reading the newspaper or watching television? : 0  Moving or speaking so slowly that other people could have noticed.  Or the opposite - being so fidgety or restless that you have been moving around alot more than usual. : 0  Thoughts that you would be better off dead, or of hurting yourself?: 0  Patient Health Questionnaire Score: 1    If depressive symptoms identified deferred to follow up visit unless specifically addressed in assesment and plan.    Interpretation of PHQ-9 Total Score   Score Severity   1-4 No Depression   5-9 Mild Depression   10-14 Moderate Depression   15-19 Moderately Severe Depression   20-27 Severe Depression    TERI-7 Questionnaire    Feeling nervous, anxious, or on edge: Several days  Not being able to sop or control worrying: Not at all  Worrying too much about different things: Not at all  Trouble relaxing: Not at all  Being so restless that it's hard to sit still: Not at all  Becoming easily annoyed or irritable: Not at all  Feeling afraid as if  something awful might happen: Not at all  Total: 1    Interpretation of TERI 7 Total Score   Score Severity :  0-4 No Anxiety   5-9 Mild Anxiety  10-14 Moderate Anxiety  15-21 Severe Anxiety           Current medicines (including changes today)  Current Outpatient Medications   Medication Sig Dispense Refill   • buPROPion (WELLBUTRIN XL) 150 MG XL tablet Take 1 Tab by mouth every morning. 30 Tab 2   • triamcinolone acetonide (KENALOG) 0.1 % Cream Apply 2-4 grams to lower back or left lower ankle twice daily as needed 454 g 0   • albuterol 108 (90 Base) MCG/ACT Aero Soln inhalation aerosol Inhale 2 Puffs by mouth every 6 hours as needed for Shortness of Breath. 8.5 g 3   • fluticasone (FLOVENT HFA) 220 MCG/ACT Aerosol Inhale 1 Puff by mouth 2 times a day. 3 Inhaler 1   • atorvastatin (LIPITOR) 20 MG Tab Take 1 Tab by mouth every day. TAKE 1 TAB BY MOUTH EVERY DAY. 100 Tab 2   • clopidogrel (PLAVIX) 75 MG Tab TAKE 1 TABLET BY MOUTH EVERY DAY 90 Tab 3   • hydroCHLOROthiazide (HYDRODIURIL) 25 MG Tab TAKE 1 TABLET BY MOUTH EVERY DAY 90 Tab 3   • omeprazole (PRILOSEC) 40 MG delayed-release capsule TAKE 1 CAP BY MOUTH EVERY DAY. 90 Cap 3   • fluticasone (FLONASE) 50 MCG/ACT nasal spray USE 2 SPRAYS IN NOSE EVERY DAY. 16 g 5   • PROAIR  (90 Base) MCG/ACT Aero Soln inhalation aerosol INHALE 2 PUFFS BY MOUTH EVERY 6 HOURS AS NEEDED FOR SHORTNESS OF BREATH. 8.5 Inhaler 5   • aspirin (ASA) 325 MG Tab Take 325 mg by mouth every 6 hours as needed.     • Ferrous Sulfate (IRON) 325 (65 FE) MG TABS Take 1 Tab by mouth 2 times a day, with meals.     • Naproxen Sodium (ALEVE PO) Take 1 Tab by mouth as needed.       No current facility-administered medications for this visit.      She  has a past medical history of Anesthesia, Anxiety (6/9/2016), Bell palsy (5/14/2015), Chronic fatigue (1/15/2016), CKD (chronic kidney disease) stage 3, GFR 30-59 ml/min ([facility]) (3/3/2018), COPD (chronic obstructive pulmonary disease) ([facility]), Dental  "disorder, Dyslipidemia (1/4/2017), Environmental allergies (7/5/2017), GERD (gastroesophageal reflux disease), Hair thinning (1/15/2016), Hiatus hernia syndrome, History of esophageal cancer, Hypertension (4/1/2015), Myocardial infarct ([facility]) (1996), Pneumonia (2007), Post-menopausal osteoporosis (3/3/2018), Primary osteoarthritis involving multiple joints (12/12/2017), Raynaud's disease (1/15/2016), and Shingles (2014).    ROS included above     Objective:     /68 (BP Location: Left arm, Patient Position: Sitting, BP Cuff Size: Adult)   Pulse 64   Temp 36.1 °C (97 °F)   Resp 12   Ht 1.676 m (5' 6\")   Wt 76.2 kg (168 lb)   SpO2 95%  Body mass index is 27.12 kg/m².     Physical Exam:  General: Alert, oriented in no acute distress.  Eye contact is good, speech is normal, affect calm  Lungs: clear to auscultation bilaterally, normal effort, no wheeze/ rhonchi/ rales.  CV: regular rate and rhythm, S1, S2  Ext: no edema, color normal, vascularity normal, temperature normal    Assessment and Plan:   The following treatment plan was discussed  1. Situational anxiety   she is doing much better at this time and happy with the bupropion.  We will continue with current dose.  She will follow-up for any further changes       Followup: As needed         Please note that this dictation was created using voice recognition software. I have worked with consultants from the vendor as well as technical experts from [employer] to optimize the interface. I have made every reasonable attempt to correct obvious errors, but I expect that there are errors of grammar and possibly content that I did not discover before finalizing the note.       "

## 2020-03-13 NOTE — ASSESSMENT & PLAN NOTE
Randi is here today to follow-up on new prescription for bupropion which she tells me is working very well.  Overall she is handling her stress much better, feeling more relaxed, less anxious.  Sleeping better.  The situation with her neighbor has been improving.  No side effects related to medication use including increased blood pressure, headaches, nausea, diarrhea  Patient Health Questionaire    Little interest or pleasure in doing things?: 0   Feeling down, depressed, or hopeless?: 0  Trouble falling or staying asleep, or sleeping too much? : 1  Feeling tired or having little energy? : 0  Poor appetite or overeating? : 0  Feeling bad about yourself - or that you are a failure or have let yourself or your family down? : 0  Trouble concentrating on things, such as reading the newspaper or watching television? : 0  Moving or speaking so slowly that other people could have noticed.  Or the opposite - being so fidgety or restless that you have been moving around alot more than usual. : 0  Thoughts that you would be better off dead, or of hurting yourself?: 0  Patient Health Questionnaire Score: 1    If depressive symptoms identified deferred to follow up visit unless specifically addressed in assesment and plan.    Interpretation of PHQ-9 Total Score   Score Severity   1-4 No Depression   5-9 Mild Depression   10-14 Moderate Depression   15-19 Moderately Severe Depression   20-27 Severe Depression    TERI-7 Questionnaire    Feeling nervous, anxious, or on edge: Several days  Not being able to sop or control worrying: Not at all  Worrying too much about different things: Not at all  Trouble relaxing: Not at all  Being so restless that it's hard to sit still: Not at all  Becoming easily annoyed or irritable: Not at all  Feeling afraid as if something awful might happen: Not at all  Total: 1    Interpretation of TERI 7 Total Score   Score Severity :  0-4 No Anxiety   5-9 Mild Anxiety  10-14 Moderate Anxiety  15-21  Severe Anxiety

## 2020-03-17 ENCOUNTER — PATIENT MESSAGE (OUTPATIENT)
Dept: MEDICAL GROUP | Facility: MEDICAL CENTER | Age: 80
End: 2020-03-17

## 2020-03-18 ENCOUNTER — PATIENT MESSAGE (OUTPATIENT)
Dept: MEDICAL GROUP | Facility: MEDICAL CENTER | Age: 80
End: 2020-03-18

## 2020-03-18 RX ORDER — AMOXICILLIN AND CLAVULANATE POTASSIUM 875; 125 MG/1; MG/1
1 TABLET, FILM COATED ORAL 2 TIMES DAILY
Qty: 20 TAB | Refills: 0 | Status: ON HOLD | OUTPATIENT
Start: 2020-03-18 | End: 2020-12-31

## 2020-03-18 NOTE — TELEPHONE ENCOUNTER
From: Randi Bello  To: RAUDEL Cazares  Sent: 3/18/2020 3:10 PM PDT  Subject: Non-Urgent Medical Question    Thank you Mansi. Starting to get deep cough, temp hasn't gone over 99.9 yet. Thinking I may need help of augmentin before long. I hate these sinus flare-ups. I can count on getting them at least twice a year. I Meant to ask you how the new puppy is doing. :)      ----- Message -----   From:SABINA CazaresRCARSON   Sent:3/18/2020 9:20 AM PDT   To:Randi Bello   Subject:RE: Non-Urgent Medical Question    Keep an eye on it and let me know if you feel anything is getting worse.      ----- Message -----   From:Randi Bello   Sent:3/18/2020 8:44 AM PDT   To:RAUDEL Cazares   Subject:Non-Urgent Medical Question    Thanks Mansi, I think I was concentrating on Coronavirus so much, I was ignoring the fact that I have had sinus drainage and the usual symptoms of a sinus infection starting. Am drinking benitez liquids and getting a lot of rest. Just hope it doesn't get ahold of me. Thanks again, Randi Bello (Sherri)      ----- Message -----   From:SABINA CazaresRCARSON   Sent:3/18/2020 7:18 AM PDT   To:Randi Bello   Subject:RE: Non-Urgent Medical Question    You could be coming down with a slight virus, I would just suggest that you stay home and rest. Be sure you are getting lots of fluids. If you had a fever over 101 or begin to feel any other symptoms please let me know.      ----- Message -----   From:Randi Bello   Sent:3/17/2020 4:04 PM PDT   To:SABINA CazaresRCARSON   Subject:Non-Urgent Medical Question    Hi Mansi,  I think this is non-urgent. I started feeling a little warm and took my temp with an outdated thermometer. On Friday it was 97. something, today, now, it is 99.7. Anything to worry about ?  Randi Bello [phone]

## 2020-03-18 NOTE — TELEPHONE ENCOUNTER
From: Randi Bello  To: RAUDEL Cazares  Sent: 3/17/2020 4:04 PM PDT  Subject: Non-Urgent Medical Question    Orion Nazario, I think this is non-urgent. I started feeling a little warm and took my temp with an outdated thermometer. On Friday it was 97. something, today, now, it is 99.7. Anything to worry about ?  Randi Bello [phone]

## 2020-03-19 ENCOUNTER — PATIENT MESSAGE (OUTPATIENT)
Dept: MEDICAL GROUP | Facility: MEDICAL CENTER | Age: 80
End: 2020-03-19

## 2020-03-19 NOTE — TELEPHONE ENCOUNTER
From: Randi Bello  To: RAUDEL Cazares  Sent: 3/18/2020 6:42 PM PDT  Subject: Non-Urgent Medical Question    Thank you. I'm sure it will help. My past experience with training was I used a pad in bathroom, then gradually moved it to back door. They have a mind of their own, for sure. Thx again      ----- Message -----   From:RAUDEL Cazares   Sent:3/18/2020 3:34 PM PDT    To:Randi Bello   Subject:RE: Non-Urgent Medical Question    She is doing great, although not quite getting the potty training down just yet.  I will send in a prescription so that you can  and start if you feel you need it.      ----- Message -----   From:Randi Bello   Sent:3/18/2020 3:10 PM PDT   To:RAUDEL Cazares   Subject:Non-Urgent Medical Question    Thank you Mansi. Starting to get deep cough, temp hasn't gone over 99.9 yet. Thinking I may need help of augmentin before long. I hate these sinus flare-ups. I can count on getting them at least twice a year. I Meant to ask you how the new puppy is doing. :)      ----- Message -----   From:RAUDEL Cazares   Sent:3/18/2020 9:20 AM PDT   To:Randi Bello   Subject:RE: Non-Urgent Medical Question    Keep an eye on it and let me know if you feel anything is getting worse.      ----- Message -----   From:Randi Bello   Sent:3/18/2020 8:44 AM PDT   To:RAUDEL Cazares   Subject:Non-Urgent Medical Question    Thanks Mansi, I think I was concentrating on Coronavirus so much, I was ignoring the fact that I have had sinus drainage and the usual symptoms of a sinus infection starting. Am drinking benitez liquids and getting a lot of rest. Just hope it doesn't get ahold of me. Thanks again, Randi Bello (Sherri)      ----- Message -----   From:RAUDEL Cazares   Sent:3/18/2020 7:18 AM PDT   To:Randi Bello   Subject:RE: Non-Urgent Medical Question    You could be coming down with a slight virus, I would just  suggest that you stay home and rest. Be sure you are getting lots of fluids. If you had a fever over 101 or begin to feel any other symptoms please let me know.      ----- Message -----   From:Randi Bello   Sent:3/17/2020 4:04 PM PDT   To:RAUDEL Cazares   Subject:Non-Urgent Medical Question    Orion Nazario, I think this is non-urgent. I started feeling a little warm and took my temp with an outdated thermometer. On Friday it was 97. something,  today, now, it is 99.7. Anything to worry about ?  Randi Bello [phone]

## 2020-03-19 NOTE — TELEPHONE ENCOUNTER
"From: Randi Bello  To: RAUDEL Cazares  Sent: 3/19/2020 9:18 AM PDT  Subject: Non-Urgent Medical Question    Thumbs up, and good luck. I think you rec'd two msgs from me as it didn't show msg sent. Just to let you know I'm not suffering from \"mental\" problem, just physical lol      ----- Message -----   From:RAUDEL Cazares   Sent:3/18/2020 6:44 PM PDT   To:Randi Victorina Bello   Subject:RE: Non-Urgent Medical Question    Viola just wants to tear up the pads! We will get there, hopefully within 30 days!!      ----- Message -----   From:Randi Bello   Sent:3/18/2020 6:42 PM PDT   To:RAUDEL Cazares   Subject:Non-Urgent Medical Question    Thank you. I'm sure it will help. My past experience with training was I used a pad in bathroom, then gradually moved it to back door. They have a mind of their own, for sure. Thx again      ----- Message -----   From:RAUEDL Cazares   Sent:3/18/2020 3:34 PM PDT   To:Randi Victorina Bello   Subject:RE: Non-Urgent Medical Question    She is doing great, although not quite getting the potty training down just yet.  I will send in a prescription so that you can  and start if you feel you need it.      ----- Message -----   From:Randi Bello   Sent:3/18/2020 3:10 PM PDT   To:RAUDEL Cazares   Subject:Non-Urgent Medical Question    Thank you Mansi. Starting to get deep cough, temp hasn't gone over 99.9 yet. Thinking I may need help of augmentin before long. I hate these sinus flare-ups. I can count on getting them at least twice a year. I Meant to ask you how the new puppy is doing. :)      ----- Message -----   From:RAUDEL Cazares   Sent:3/18/2020 9:20 AM PDT   To:Randi Bello   Subject:RE: Non-Urgent Medical Question    Keep an eye on it and let me know if you feel anything is getting worse.      ----- Message -----   From:Randi Bello   Sent:3/18/2020 8:44 AM PDT   To:RAUDEL Cazares   " Subject:Non-Urgent Medical Question    Indy Nazario, I think I was concentrating on Coronavirus so much, I was ignoring the fact that I have had sinus drainage and the usual symptoms of a sinus infection starting. Am drinking benitez liquids and getting a lot of rest. Just hope it doesn't get ahold of me. Thanks again, Randi Bello (Sherri)      ----- Message -----   From:QUYNH CazaresPIvaRCARSON   Sent:3/18/2020 7:18 AM PDT    To:Randi Bello   Subject:RE: Non-Urgent Medical Question    You could be coming down with a slight virus, I would just suggest that you stay home and rest. Be sure you are getting lots of fluids. If you had a fever over 101 or begin to feel any other symptoms please let me know.      ----- Message -----   From:Randi Bello   Sent:3/17/2020 4:04 PM PDT   To:QUYNH CazaresPIvaRCARSON   Subject:Non-Urgent Medical Question    Orion Nazario, I think this is non-urgent. I started feeling a little warm and took my temp with an outdated thermometer. On Friday it was 97. something, today, now, it is 99.7. Anything to worry about ?  Randi Bello [phone]

## 2020-03-26 DIAGNOSIS — K21.9 GASTROESOPHAGEAL REFLUX DISEASE WITHOUT ESOPHAGITIS: ICD-10-CM

## 2020-03-26 RX ORDER — OMEPRAZOLE 40 MG/1
40 CAPSULE, DELAYED RELEASE ORAL DAILY
Qty: 90 CAP | Refills: 1 | Status: SHIPPED | OUTPATIENT
Start: 2020-03-26 | End: 2020-09-08 | Stop reason: SDUPTHER

## 2020-03-26 NOTE — TELEPHONE ENCOUNTER
received via [employer]    Received request via: Pharmacy    Was the patient seen in the last year in this department? Yes    Does the patient have an active prescription (recently filled or refills available) for medication(s) requested? No     Requested Prescriptions     Pending Prescriptions Disp Refills   • omeprazole (PRILOSEC) 40 MG delayed-release capsule 90 Cap      Sig: Take 1 Cap by mouth every day.

## 2020-04-07 ENCOUNTER — PATIENT MESSAGE (OUTPATIENT)
Dept: MEDICAL GROUP | Facility: MEDICAL CENTER | Age: 80
End: 2020-04-07

## 2020-04-07 NOTE — TELEPHONE ENCOUNTER
From: Randi Bello  To: RAUDEL Cazares  Sent: 4/7/2020 4:34 PM PDT  Subject: Non-Urgent Medical Question    Thank you anyway. I am doing great, hoping the same for you and your family. Also hoping puppy training is going great. lol      ----- Message -----   From:RAUDEL Cazares   Sent:4/7/2020 3:14 PM PDT   To:Randi Bello   Subject:RE: Non-Urgent Medical Question    Orion Bryan,  Unfortunately I cannot answer this for you. We only check your blood type if you are having a major surgery or in need of a blood transfusion, otherwise insurance does not cover. They can tell you if you donate blood.  Hope you're doing well.  Monique ARRIETA      ----- Message -----   From:Randi Bello   Sent:4/7/2020 2:59 PM PDT   To:RAUDEL Cazares   Subject:Non-Urgent Medical Question    Orion Nazario, please forgive my lack of knowledge, but I have no clue what blood type I am. Any way you can help answer my ?

## 2020-04-13 ENCOUNTER — PATIENT MESSAGE (OUTPATIENT)
Dept: MEDICAL GROUP | Facility: MEDICAL CENTER | Age: 80
End: 2020-04-13

## 2020-04-13 DIAGNOSIS — M15.9 PRIMARY OSTEOARTHRITIS INVOLVING MULTIPLE JOINTS: ICD-10-CM

## 2020-04-14 ENCOUNTER — PATIENT MESSAGE (OUTPATIENT)
Dept: MEDICAL GROUP | Facility: MEDICAL CENTER | Age: 80
End: 2020-04-14

## 2020-04-14 ENCOUNTER — TELEPHONE (OUTPATIENT)
Dept: MEDICAL GROUP | Facility: MEDICAL CENTER | Age: 80
End: 2020-04-14

## 2020-04-14 RX ORDER — HYDROCODONE BITARTRATE AND ACETAMINOPHEN 5; 325 MG/1; MG/1
1 TABLET ORAL 2 TIMES DAILY PRN
Qty: 60 TAB | Refills: 0 | Status: SHIPPED | OUTPATIENT
Start: 2020-04-14 | End: 2020-05-14

## 2020-04-14 NOTE — TELEPHONE ENCOUNTER
MEDICATION PRIOR AUTHORIZATION NEEDED:    1. Name of Medication: HYDROcodone-acetaminophen (NORCO) 5-325 MG     2. Requested By (Name of Pharmacy): [facility]      3. Is insurance on file current? Yes     4. What is the name & phone number of the 3rd party payor? [facility]

## 2020-04-14 NOTE — TELEPHONE ENCOUNTER
DOCUMENTATION OF PAR STATUS:    1. Name of Medication & Dose: HYDROcodone-acetaminophen (NORCO) 5-325 MG      2. Name of Prescription Coverage Company & phone #: [employer]    3. Date Prior Auth Submitted: 04/14/2020    4. What information was given to obtain insurance decision? In Chart    5. Prior Auth Status? Pending    6. Patient Notified: yes

## 2020-04-15 ENCOUNTER — PATIENT MESSAGE (OUTPATIENT)
Dept: MEDICAL GROUP | Facility: MEDICAL CENTER | Age: 80
End: 2020-04-15

## 2020-04-15 NOTE — TELEPHONE ENCOUNTER
From: Randi Bello  To: RAUDEL Cazares  Sent: 4/15/2020 10:32 AM PDT  Subject: Non-Urgent Medical Question    Sorry for all of this communication Mansi. The pharmacy just called and said it has been approved. Thanks again, Randi      ----- Message -----   From:RAUDEL Cazares   Sent:4/15/2020 6:52 AM PDT   To:Randi Bello   Subject:RE: Non-Urgent Medical Question    We have not had to do anything in the past for this prescription (that I recall). Please check with the pharmacy and see if they are able to fill now.      ----- Message -----   From:Randi Bello   Sent:4/14/2020 6:22 PM PDT   To:RAUDEL Cazares   Subject:RE: Non-Urgent Medical Question    Orion Nazario, rec'd your assistant's msg regarding my presc NORCO 5-325 and pre authorization from my Ins. co.   I received a phone call from [employer] this evening saying they have authorized this request today. If there are any ? 's they said for you to call [employer] # [phone] to verify. I don't know if I need to let my pharmacist know of this or what action I need to take.  Thank You, Randi      ----- Message -----   From:Iveth Cooper Med [facility]'t   Sent:4/14/2020 2:04 PM PDT   To:Randi Bello   Subject:RE: Non-Urgent Medical Question    Orion Bryan,     Your insurance company has requested an additional authorization for your HYDROcodone-acetaminophen (NORCO) 5-325 MG. We have initiated a prior authorization process, and we expect to complete this in 7-10 business days. On rare occasions prior authorizations may take longer than expected.     The status of your prior authorization for HYDROcodone-acetaminophen (NORCO) 5-325 MG is: Pending on 4/14/2020     Here is the information we have for you on file:     Insurance company: [facility]   Pharmacy:   [facility]/pharmacy #[id number] - [place], [place] - [address]  [address]  [facility] [address]  Phone: [phone] Fax: [phone]       If you have any questions  "or if any of this information is incorrect, please do not hesitate to contact us through [employer] or at [phone].      ----- Message -----   From:Randi Bello   Sent:4/14/2020 1:50 PM PDT   To:RAUDEL Cazares   Subject:Non-Urgent Medical Question    Orion Nazario, me again. I went to  my [facility]... prescription and they were going to charge me 10 times the normal amount. Their reason was that my Doctor had to call my insurance co and explain why I need more than a 7 day supply. They called it a \"P A R \"Prior auth request? What a pain in the backside, I'm so sorry.  Thank you, Randi"

## 2020-04-15 NOTE — TELEPHONE ENCOUNTER
FINAL PRIOR AUTHORIZATION STATUS:    1.  Name of Medication & Dose: HYDROcodone-acetaminophen (NORCO) 5-325 MG      2. Prior Auth Status: Approved through 05/13/2020     3. Action Taken: Pharmacy Notified: yes Patient Notified: yes

## 2020-05-05 DIAGNOSIS — Z91.09 ENVIRONMENTAL ALLERGIES: ICD-10-CM

## 2020-05-05 DIAGNOSIS — I10 ESSENTIAL HYPERTENSION: ICD-10-CM

## 2020-05-05 RX ORDER — FLUTICASONE PROPIONATE 50 MCG
SPRAY, SUSPENSION (ML) NASAL
Qty: 16 G | Refills: 5 | Status: SHIPPED | OUTPATIENT
Start: 2020-05-05 | End: 2021-01-08

## 2020-05-05 RX ORDER — HYDROCHLOROTHIAZIDE 25 MG/1
25 TABLET ORAL
Qty: 90 TAB | Refills: 1 | Status: SHIPPED | OUTPATIENT
Start: 2020-05-05 | End: 2020-11-06 | Stop reason: SDUPTHER

## 2020-05-21 ENCOUNTER — HOSPITAL ENCOUNTER (OUTPATIENT)
Dept: LAB | Facility: MEDICAL CENTER | Age: 80
End: 2020-05-21
Attending: NURSE PRACTITIONER
Payer: MEDICARE

## 2020-05-21 DIAGNOSIS — R06.02 SHORTNESS OF BREATH ON EXERTION: ICD-10-CM

## 2020-05-21 DIAGNOSIS — Z13.29 THYROID DISORDER SCREEN: ICD-10-CM

## 2020-05-21 LAB
ALBUMIN SERPL BCP-MCNC: 4.1 G/DL (ref 3.2–4.9)
ALBUMIN/GLOB SERPL: 1.2 G/DL
ALP SERPL-CCNC: 81 U/L (ref 30–99)
ALT SERPL-CCNC: 17 U/L (ref 2–50)
ANION GAP SERPL CALC-SCNC: 11 MMOL/L (ref 7–16)
AST SERPL-CCNC: 24 U/L (ref 12–45)
BASOPHILS # BLD AUTO: 0.6 % (ref 0–1.8)
BASOPHILS # BLD: 0.06 K/UL (ref 0–0.12)
BILIRUB SERPL-MCNC: 0.4 MG/DL (ref 0.1–1.5)
BUN SERPL-MCNC: 17 MG/DL (ref 8–22)
CALCIUM SERPL-MCNC: 9.8 MG/DL (ref 8.5–10.5)
CHLORIDE SERPL-SCNC: 101 MMOL/L (ref 96–112)
CO2 SERPL-SCNC: 28 MMOL/L (ref 20–33)
CREAT SERPL-MCNC: 0.98 MG/DL (ref 0.5–1.4)
EOSINOPHIL # BLD AUTO: 0.11 K/UL (ref 0–0.51)
EOSINOPHIL NFR BLD: 1.1 % (ref 0–6.9)
ERYTHROCYTE [DISTWIDTH] IN BLOOD BY AUTOMATED COUNT: 49.8 FL (ref 35.9–50)
GLOBULIN SER CALC-MCNC: 3.3 G/DL (ref 1.9–3.5)
GLUCOSE SERPL-MCNC: 98 MG/DL (ref 65–99)
HCT VFR BLD AUTO: 44.5 % (ref 37–47)
HGB BLD-MCNC: 14.2 G/DL (ref 12–16)
IMM GRANULOCYTES # BLD AUTO: 0.04 K/UL (ref 0–0.11)
IMM GRANULOCYTES NFR BLD AUTO: 0.4 % (ref 0–0.9)
LYMPHOCYTES # BLD AUTO: 3.81 K/UL (ref 1–4.8)
LYMPHOCYTES NFR BLD: 39.6 % (ref 22–41)
MCH RBC QN AUTO: 32.1 PG (ref 27–33)
MCHC RBC AUTO-ENTMCNC: 31.9 G/DL (ref 33.6–35)
MCV RBC AUTO: 100.7 FL (ref 81.4–97.8)
MONOCYTES # BLD AUTO: 0.68 K/UL (ref 0–0.85)
MONOCYTES NFR BLD AUTO: 7.1 % (ref 0–13.4)
NEUTROPHILS # BLD AUTO: 4.92 K/UL (ref 2–7.15)
NEUTROPHILS NFR BLD: 51.2 % (ref 44–72)
NRBC # BLD AUTO: 0 K/UL
NRBC BLD-RTO: 0 /100 WBC
PLATELET # BLD AUTO: 282 K/UL (ref 164–446)
PMV BLD AUTO: 10.9 FL (ref 9–12.9)
POTASSIUM SERPL-SCNC: 3.9 MMOL/L (ref 3.6–5.5)
PROT SERPL-MCNC: 7.4 G/DL (ref 6–8.2)
RBC # BLD AUTO: 4.42 M/UL (ref 4.2–5.4)
SODIUM SERPL-SCNC: 140 MMOL/L (ref 135–145)
TSH SERPL DL<=0.005 MIU/L-ACNC: 1.95 UIU/ML (ref 0.38–5.33)
WBC # BLD AUTO: 9.6 K/UL (ref 4.8–10.8)

## 2020-05-21 PROCEDURE — 84443 ASSAY THYROID STIM HORMONE: CPT

## 2020-05-21 PROCEDURE — 80053 COMPREHEN METABOLIC PANEL: CPT

## 2020-05-21 PROCEDURE — 36415 COLL VENOUS BLD VENIPUNCTURE: CPT

## 2020-05-21 PROCEDURE — 85025 COMPLETE CBC W/AUTO DIFF WBC: CPT

## 2020-06-10 DIAGNOSIS — I25.2 HISTORY OF MI (MYOCARDIAL INFARCTION): ICD-10-CM

## 2020-06-10 RX ORDER — CLOPIDOGREL BISULFATE 75 MG/1
75 TABLET ORAL
Qty: 90 TAB | Refills: 3 | Status: SHIPPED | OUTPATIENT
Start: 2020-06-10 | End: 2021-06-21 | Stop reason: SDUPTHER

## 2020-06-15 DIAGNOSIS — L30.9 DERMATITIS: ICD-10-CM

## 2020-06-15 RX ORDER — TRIAMCINOLONE ACETONIDE 1 MG/G
CREAM TOPICAL
Qty: 454 G | Refills: 0 | Status: ON HOLD | OUTPATIENT
Start: 2020-06-15 | End: 2020-12-31

## 2020-06-15 NOTE — TELEPHONE ENCOUNTER
Received via [employer]    Received request via: Pharmacy    Was the patient seen in the last year in this department? Yes    Does the patient have an active prescription (recently filled or refills available) for medication(s) requested? No     Requested Prescriptions     Pending Prescriptions Disp Refills   • triamcinolone acetonide (KENALOG) 0.1 % Cream 454 g 0     Sig: Apply 2-4 grams to lower back or left lower ankle twice daily as needed

## 2020-06-29 ENCOUNTER — PATIENT MESSAGE (OUTPATIENT)
Dept: MEDICAL GROUP | Facility: MEDICAL CENTER | Age: 80
End: 2020-06-29

## 2020-06-29 DIAGNOSIS — M54.6 ACUTE MIDLINE THORACIC BACK PAIN: ICD-10-CM

## 2020-06-30 RX ORDER — HYDROCODONE BITARTRATE AND ACETAMINOPHEN 5; 325 MG/1; MG/1
1 TABLET ORAL 2 TIMES DAILY PRN
Qty: 60 TAB | Refills: 0 | Status: SHIPPED | OUTPATIENT
Start: 2020-06-30 | End: 2020-07-30

## 2020-07-31 ENCOUNTER — PATIENT OUTREACH (OUTPATIENT)
Dept: HEALTH INFORMATION MANAGEMENT | Facility: OTHER | Age: 80
End: 2020-07-31

## 2020-08-28 ENCOUNTER — OFFICE VISIT (OUTPATIENT)
Dept: MEDICAL GROUP | Facility: MEDICAL CENTER | Age: 80
End: 2020-08-28
Payer: MEDICARE

## 2020-08-28 VITALS
HEART RATE: 63 BPM | BODY MASS INDEX: 28.23 KG/M2 | WEIGHT: 165.34 LBS | HEIGHT: 64 IN | TEMPERATURE: 97.4 F | DIASTOLIC BLOOD PRESSURE: 70 MMHG | SYSTOLIC BLOOD PRESSURE: 132 MMHG | RESPIRATION RATE: 18 BRPM | OXYGEN SATURATION: 93 %

## 2020-08-28 DIAGNOSIS — M15.9 PRIMARY OSTEOARTHRITIS INVOLVING MULTIPLE JOINTS: ICD-10-CM

## 2020-08-28 DIAGNOSIS — Z76.0 MEDICATION REFILL: ICD-10-CM

## 2020-08-28 PROCEDURE — 99213 OFFICE O/P EST LOW 20 MIN: CPT | Performed by: NURSE PRACTITIONER

## 2020-08-28 RX ORDER — HYDROCODONE BITARTRATE AND ACETAMINOPHEN 5; 325 MG/1; MG/1
1 TABLET ORAL 2 TIMES DAILY PRN
Qty: 60 TAB | Refills: 0 | Status: SHIPPED | OUTPATIENT
Start: 2020-09-27 | End: 2020-10-27

## 2020-08-28 RX ORDER — HYDROCODONE BITARTRATE AND ACETAMINOPHEN 5; 325 MG/1; MG/1
1 TABLET ORAL 2 TIMES DAILY PRN
Qty: 60 TAB | Refills: 0 | Status: SHIPPED | OUTPATIENT
Start: 2020-08-28 | End: 2020-09-27

## 2020-08-28 RX ORDER — HYDROCODONE BITARTRATE AND ACETAMINOPHEN 5; 325 MG/1; MG/1
1 TABLET ORAL 2 TIMES DAILY PRN
Qty: 60 TAB | Refills: 0 | Status: SHIPPED | OUTPATIENT
Start: 2020-10-27 | End: 2020-11-26

## 2020-08-28 ASSESSMENT — FIBROSIS 4 INDEX: FIB4 SCORE: 1.67

## 2020-08-28 NOTE — PROGRESS NOTES
Subjective:     Chief Complaint   Patient presents with   • Medication Refill     norco for back pain   • Other     wanting to know blood type     Randi Bello is a 80 y.o. female here today to follow up on:    Primary osteoarthritis involving multiple joints  Chronic issue controlled at this time with over-the-counter Tylenol and as needed use of Norco.  Typically 1-2 tabs daily depending on activity level.  She gets good relief with pain medication, denies any concerning side effects including constipation, sedation.  She is unable to take NSAIDs due to chronic kidney disease  Patient questions her blood type.  I am able to find that she is A positive in her chart    Current medicines (including changes today)  Current Outpatient Medications   Medication Sig Dispense Refill   • HYDROcodone-acetaminophen (NORCO) 5-325 MG Tab per tablet Take 1 Tab by mouth 2 times a day as needed for up to 30 days. 60 Tab 0   • [START ON 9/27/2020] HYDROcodone-acetaminophen (NORCO) 5-325 MG Tab per tablet Take 1 Tab by mouth 2 times a day as needed for up to 30 days. 60 Tab 0   • [START ON 10/27/2020] HYDROcodone-acetaminophen (NORCO) 5-325 MG Tab per tablet Take 1 Tab by mouth 2 times a day as needed for up to 30 days. 60 Tab 0   • triamcinolone acetonide (KENALOG) 0.1 % Cream Apply 2-4 grams to lower back or left lower ankle twice daily as needed 454 g 0   • clopidogrel (PLAVIX) 75 MG Tab Take 1 Tab by mouth every day. 90 Tab 3   • fluticasone (FLONASE) 50 MCG/ACT nasal spray USE 2 SPRAYS IN NOSE EVERY DAY. 16 g 5   • hydroCHLOROthiazide (HYDRODIURIL) 25 MG Tab Take 1 Tab by mouth every day. 90 Tab 1   • omeprazole (PRILOSEC) 40 MG delayed-release capsule Take 1 Cap by mouth every day. 90 Cap 1   • amoxicillin-clavulanate (AUGMENTIN) 875-125 MG Tab Take 1 Tab by mouth 2 times a day. 20 Tab 0   • buPROPion (WELLBUTRIN XL) 150 MG XL tablet Take 1 Tab by mouth every morning. 30 Tab 2   • albuterol 108 (90 Base) MCG/ACT Aero  "Soln inhalation aerosol Inhale 2 Puffs by mouth every 6 hours as needed for Shortness of Breath. 8.5 g 3   • fluticasone (FLOVENT HFA) 220 MCG/ACT Aerosol Inhale 1 Puff by mouth 2 times a day. 3 Inhaler 1   • atorvastatin (LIPITOR) 20 MG Tab Take 1 Tab by mouth every day. TAKE 1 TAB BY MOUTH EVERY DAY. 100 Tab 2   • PROAIR  (90 Base) MCG/ACT Aero Soln inhalation aerosol INHALE 2 PUFFS BY MOUTH EVERY 6 HOURS AS NEEDED FOR SHORTNESS OF BREATH. 8.5 Inhaler 5   • aspirin (ASA) 325 MG Tab Take 325 mg by mouth every 6 hours as needed.     • Ferrous Sulfate (IRON) 325 (65 FE) MG TABS Take 1 Tab by mouth 2 times a day, with meals.     • Naproxen Sodium (ALEVE PO) Take 1 Tab by mouth as needed.       No current facility-administered medications for this visit.      She  has a past medical history of Anesthesia, Anxiety (6/9/2016), Bell palsy (5/14/2015), Chronic fatigue (1/15/2016), CKD (chronic kidney disease) stage 3, GFR 30-59 ml/min ([facility]) (3/3/2018), COPD (chronic obstructive pulmonary disease) ([facility]), Dental disorder, Dyslipidemia (1/4/2017), Environmental allergies (7/5/2017), GERD (gastroesophageal reflux disease), Hair thinning (1/15/2016), Hiatus hernia syndrome, History of esophageal cancer, Hypertension (4/1/2015), Myocardial infarct ([facility]) (1996), Pneumonia (2007), Post-menopausal osteoporosis (3/3/2018), Primary osteoarthritis involving multiple joints (12/12/2017), Raynaud's disease (1/15/2016), and Shingles (2014).    ROS included above     Objective:     /70 (BP Location: Left arm, Patient Position: Sitting, BP Cuff Size: Adult)   Pulse 63   Temp 36.3 °C (97.4 °F) (Temporal)   Resp 18   Ht 1.626 m (5' 4\")   Wt 75 kg (165 lb 5.5 oz)   SpO2 93%  Body mass index is 28.38 kg/m².     Physical Exam:  General: Alert, oriented in no acute distress.  Eye contact is good, speech is normal, affect calmLungs: clear to auscultation bilaterally, normal effort, no wheeze/ rhonchi/ rales.  CV: regular rate " and rhythm, S1, S2, no murmur  Ext: no edema, color normal, vascularity normal, temperature normal    Assessment and Plan:   The following treatment plan was discussed   1. Primary osteoarthritis involving multiple joints   stable and doing well on current medication regimen.  Consent for opioid prescription reviewed and signed today, refills provided.   report reviewed and without aberrant behavior  Consent for Opiate Prescription    HYDROcodone-acetaminophen (NORCO) 5-325 MG Tab per tablet    HYDROcodone-acetaminophen (NORCO) 5-325 MG Tab per tablet    HYDROcodone-acetaminophen (NORCO) 5-325 MG Tab per tablet   2. Medication refill         Followup: No follow-ups on file.         Please note that this dictation was created using voice recognition software. I have worked with consultants from the vendor as well as technical experts from [employer] to optimize the interface. I have made every reasonable attempt to correct obvious errors, but I expect that there are errors of grammar and possibly content that I did not discover before finalizing the note.

## 2020-08-28 NOTE — ASSESSMENT & PLAN NOTE
Chronic issue controlled at this time with over-the-counter Tylenol and as needed use of Norco.  Typically 1-2 tabs daily depending on activity level.  She gets good relief with pain medication, denies any concerning side effects including constipation, sedation.  She is unable to take NSAIDs due to chronic kidney disease

## 2020-09-08 ENCOUNTER — PATIENT MESSAGE (OUTPATIENT)
Dept: MEDICAL GROUP | Facility: MEDICAL CENTER | Age: 80
End: 2020-09-08

## 2020-09-08 DIAGNOSIS — K21.9 GASTROESOPHAGEAL REFLUX DISEASE WITHOUT ESOPHAGITIS: ICD-10-CM

## 2020-09-08 RX ORDER — OMEPRAZOLE 40 MG/1
40 CAPSULE, DELAYED RELEASE ORAL DAILY
Qty: 90 CAP | Refills: 1 | Status: SHIPPED | OUTPATIENT
Start: 2020-09-08 | End: 2021-03-01

## 2020-09-16 ENCOUNTER — PATIENT MESSAGE (OUTPATIENT)
Dept: MEDICAL GROUP | Facility: MEDICAL CENTER | Age: 80
End: 2020-09-16

## 2020-09-17 NOTE — TELEPHONE ENCOUNTER
From: Randi Bello  To: RAUDEL Cazares  Sent: 9/16/2020 11:30 AM PDT  Subject: Non-Urgent Medical Question    Orion Nazario, just checking to see if the flu shots are available yet.? Hoping I can get the shot in your office, ...just seems safer. Thanks, Kelly

## 2020-09-22 ENCOUNTER — NON-PROVIDER VISIT (OUTPATIENT)
Dept: MEDICAL GROUP | Facility: MEDICAL CENTER | Age: 80
End: 2020-09-22
Payer: MEDICARE

## 2020-09-22 DIAGNOSIS — Z23 NEED FOR VACCINATION: ICD-10-CM

## 2020-09-22 PROCEDURE — 90662 IIV NO PRSV INCREASED AG IM: CPT | Performed by: NURSE PRACTITIONER

## 2020-09-22 PROCEDURE — G0008 ADMIN INFLUENZA VIRUS VAC: HCPCS | Performed by: NURSE PRACTITIONER

## 2020-09-22 NOTE — NON-PROVIDER
"Randi Bello is a 80 y.o. female here for a non-provider visit for:   FLU    Reason for immunization: Annual Flu Vaccine  Immunization records indicate need for vaccine: Yes, confirmed with Epic  Minimum interval has been met for this vaccine: Yes  ABN completed: Not Indicated    VIS Dated  8/15/19  was given to patient: Yes  All IAC Questionnaire questions were answered \"No.\"    Patient tolerated injection and no adverse effects were observed or reported: Yes    Pt scheduled for next dose in series: Not Indicated    "

## 2020-11-06 DIAGNOSIS — I10 ESSENTIAL HYPERTENSION: ICD-10-CM

## 2020-11-06 RX ORDER — HYDROCHLOROTHIAZIDE 25 MG/1
25 TABLET ORAL
Qty: 90 TAB | Refills: 1 | Status: SHIPPED | OUTPATIENT
Start: 2020-11-06 | End: 2021-05-07

## 2020-11-06 NOTE — TELEPHONE ENCOUNTER
Received request via: Pharmacy    Was the patient seen in the last year in this department? Yes    Does the patient have an active prescription (recently filled or refills available) for medication(s) requested? No, just ran out on 11/2

## 2020-11-24 ENCOUNTER — PATIENT MESSAGE (OUTPATIENT)
Dept: MEDICAL GROUP | Facility: MEDICAL CENTER | Age: 80
End: 2020-11-24

## 2020-11-24 RX ORDER — AMOXICILLIN AND CLAVULANATE POTASSIUM 875; 125 MG/1; MG/1
1 TABLET, FILM COATED ORAL 2 TIMES DAILY
Qty: 20 TAB | Refills: 0 | Status: SHIPPED | OUTPATIENT
Start: 2020-11-24 | End: 2021-01-08

## 2020-11-25 ENCOUNTER — PATIENT MESSAGE (OUTPATIENT)
Dept: MEDICAL GROUP | Facility: MEDICAL CENTER | Age: 80
End: 2020-11-25

## 2020-11-28 ENCOUNTER — HOSPITAL ENCOUNTER (OUTPATIENT)
Facility: MEDICAL CENTER | Age: 80
End: 2020-11-28
Attending: PHYSICIAN ASSISTANT
Payer: MEDICARE

## 2020-11-28 PROCEDURE — U0003 INFECTIOUS AGENT DETECTION BY NUCLEIC ACID (DNA OR RNA); SEVERE ACUTE RESPIRATORY SYNDROME CORONAVIRUS 2 (SARS-COV-2) (CORONAVIRUS DISEASE [COVID-19]), AMPLIFIED PROBE TECHNIQUE, MAKING USE OF HIGH THROUGHPUT TECHNOLOGIES AS DESCRIBED BY CMS-2020-01-R: HCPCS

## 2020-11-30 LAB
COVID ORDER STATUS COVID19: NORMAL
SARS-COV-2 RNA RESP QL NAA+PROBE: DETECTED
SPECIMEN SOURCE: ABNORMAL

## 2020-12-04 ENCOUNTER — PATIENT MESSAGE (OUTPATIENT)
Dept: MEDICAL GROUP | Facility: MEDICAL CENTER | Age: 80
End: 2020-12-04

## 2020-12-05 ENCOUNTER — PATIENT MESSAGE (OUTPATIENT)
Dept: MEDICAL GROUP | Facility: MEDICAL CENTER | Age: 80
End: 2020-12-05

## 2020-12-05 DIAGNOSIS — M54.6 ACUTE MIDLINE THORACIC BACK PAIN: ICD-10-CM

## 2020-12-06 RX ORDER — HYDROCODONE BITARTRATE AND ACETAMINOPHEN 5; 325 MG/1; MG/1
1 TABLET ORAL 2 TIMES DAILY PRN
Qty: 60 TAB | Refills: 0 | Status: SHIPPED | OUTPATIENT
Start: 2020-12-06 | End: 2021-01-05

## 2020-12-07 NOTE — TELEPHONE ENCOUNTER
From: Randi Bello  To: RAUDEL Cazares  Sent: 12/5/2020 2:32 PM PST  Subject: Test Result Question    Thanks Mansi for getting back to me. I am positive that the Augmentin presc helped me so much to recover quicker. Also, my back pain is still around and would appreciate another refill of Hydro codone acet. I have only used half of the dose but am running low.  I feel very donna to get through this like I did.  Thanks for your help Mansi      ----- Message -----   From:RAUDEL Cazares   Sent:12/4/2020 12:47 PM PST   To:Randi Bello   Subject:RE: Test Result Question    I am sorry to hear that, Randi. If you are starting to feel better today I would hold off on any treatment. If you feel like things are going the wrong direction we could have you do a short course of oral steroids. Be sure that you are getting plenty of rest, it is okay to use over-the-counter medications if you need.  I hope this is fairly minor for you.  Monique ARRIETA      ----- Message -----   From:Randi Bello   Sent:12/4/2020 11:09 AM PST   To:RAUDEL Cazares   Subject:Test Result Question    Orion Nazario, wanted to follow up with you. I was tested for covid Nov 28 by [facility] as my symptoms were not getting better. I just received the results five minutes ago and I did test Positive. Apparently I am on day 9 of 10 day quarantine.   Any suggestions or prescription to get my strength back would be helpful.  Am feeling better today  Thanks Mansi

## 2020-12-08 ENCOUNTER — PATIENT MESSAGE (OUTPATIENT)
Dept: MEDICAL GROUP | Facility: MEDICAL CENTER | Age: 80
End: 2020-12-08

## 2020-12-11 ENCOUNTER — PATIENT MESSAGE (OUTPATIENT)
Dept: MEDICAL GROUP | Facility: MEDICAL CENTER | Age: 80
End: 2020-12-11

## 2020-12-11 NOTE — PATIENT COMMUNICATION
Patient has been experiencing SOB since 12/10.  Patient has been monitoring oxygen at home, recently was at 94. Patient is explaining how she can not get warm, some what chills. Patient had a covid test 11/28 that was positive.

## 2020-12-11 NOTE — TELEPHONE ENCOUNTER
From: Randi Bello  To: RAUDEL Cazares  Sent: 12/11/2020 9:53 AM PST  Subject: Non-Urgent Medical Question    i Mansi, me again.   I don't know what is going on , and trust me I am not making this up, I was doing ok with my breathing until last night and this morning walking my dog. I tried the Albuterol inhaler which doesn't seem to help much.  Any suggestions ?

## 2020-12-12 ENCOUNTER — PATIENT MESSAGE (OUTPATIENT)
Dept: MEDICAL GROUP | Facility: MEDICAL CENTER | Age: 80
End: 2020-12-12

## 2020-12-12 RX ORDER — DEXAMETHASONE 6 MG/1
6 TABLET ORAL DAILY
Qty: 7 TAB | Refills: 0 | Status: SHIPPED | OUTPATIENT
Start: 2020-12-12 | End: 2020-12-19

## 2020-12-12 NOTE — TELEPHONE ENCOUNTER
From: Randi Bello  To: RAUDEL Cazares  Sent: 12/12/2020 9:45 AM PST  Subject: Non-Urgent Medical Question    Fidela,, yes I would definitely like to try that. When I was trying to read the oximeter yesterday. I didn't realize the batteries ran out and it kept reading 92. After batteries, it was between 88 and 91. years ago I was taught how to breathe for better oxygen but it hasn't been working. So sorry to bother you but I'm beginning to get frustrated. Thanks again      ----- Message -----   From:RAUDEL Cazares   Sent:12/11/2020 2:40 PM PST   To:Randi Bello   Subject:RE: Non-Urgent Medical Question    I could go ahead and send in an oral course of steroids if you like. Would you like to try that?      ----- Message -----   From:Randi Bello   Sent:12/11/2020 9:53 AM PST   To:RAUDEL Cazares   Subject:Non-Urgent Medical Question    alexandra Currania, me again.   I don't know what is going on , and trust me I am not making this up, I was doing ok with my breathing until last night and this morning walking my dog. I tried the Albuterol inhaler which doesn't seem to help much.  Any suggestions ?

## 2020-12-14 ENCOUNTER — PATIENT MESSAGE (OUTPATIENT)
Dept: MEDICAL GROUP | Facility: MEDICAL CENTER | Age: 80
End: 2020-12-14

## 2020-12-15 NOTE — TELEPHONE ENCOUNTER
From: Randi Bello  To: RAUDEL Cazares  Sent: 12/14/2020 5:23 PM PST  Subject: Non-Urgent Medical Question    Maria Del Rosario Nazario: never have I ever had a $.84 cent co pay, let alone the outcome I had with three of those seven little pills. I took first one Saturday with breakfast, had a major sweat-out that night with two ramesh changes, and woke up next morning with energy level so much higher and this morning I looked in the mirror and saw pink cheeks for the first time in weeks, plus twice the energy level again. My oximeter reading is now between 93 and 96 and feeling back to normal again. Thank you, thank you, thank you. God willing I will not be pestering you and have a wonderful Holiday. Kelly Leslie      ----- Message -----   From:RAUDEL Cazares   Sent:12/12/2020 10:53 AM PST   To:Randi Bello   Subject:RE: Non-Urgent Medical Question    I will send in dexamethasone which is a steroid that should help. Please take it with food.  If your oxygen is staying below 90% you need to go to the urgent care. They may need to set you up with oxygen.      ----- Message -----   From:Randi Bello   Sent:12/12/2020 9:45 AM PST   To:RAUDEL Cazares   Subject:Non-Urgent Medical Question    Gorge,, yes I would definitely like to try that. When I was trying to read the oximeter yesterday. I didn't realize the batteries ran out and it kept reading 92. After batteries, it was between 88 and 91. years ago I was taught how to breathe for better oxygen but it hasn't been working. So sorry to bother you but I'm beginning to get frustrated. Thanks again      ----- Message -----   From:RAUDEL Cazares   Sent:12/11/2020 2:40 PM PST   To:Randi eBllo   Subject:RE: Non-Urgent Medical Question    I could go ahead and send in an oral course of steroids if you like. Would you like to try that?      ----- Message -----   From:Randi Bello   Sent:12/11/2020 9:53 AM PST   To:Mansi CHEN  RAUDEL Thakur   Subject:Non-Urgent Medical Question    i Mansi, me again.   I don't know what is going on , and trust me I am not making this up, I was doing ok with my breathing until last night and this morning walking my dog. I tried the Albuterol inhaler which doesn't seem to help much.  Any suggestions ?

## 2020-12-18 ENCOUNTER — PATIENT MESSAGE (OUTPATIENT)
Dept: MEDICAL GROUP | Facility: MEDICAL CENTER | Age: 80
End: 2020-12-18

## 2020-12-26 ENCOUNTER — HOSPITAL ENCOUNTER (OUTPATIENT)
Facility: MEDICAL CENTER | Age: 80
End: 2020-12-26
Attending: PHYSICIAN ASSISTANT
Payer: MEDICARE

## 2020-12-26 PROCEDURE — U0003 INFECTIOUS AGENT DETECTION BY NUCLEIC ACID (DNA OR RNA); SEVERE ACUTE RESPIRATORY SYNDROME CORONAVIRUS 2 (SARS-COV-2) (CORONAVIRUS DISEASE [COVID-19]), AMPLIFIED PROBE TECHNIQUE, MAKING USE OF HIGH THROUGHPUT TECHNOLOGIES AS DESCRIBED BY CMS-2020-01-R: HCPCS

## 2020-12-27 LAB
COVID ORDER STATUS COVID19: NORMAL
SARS-COV-2 RNA RESP QL NAA+PROBE: NOTDETECTED
SPECIMEN SOURCE: NORMAL

## 2020-12-28 ENCOUNTER — PATIENT MESSAGE (OUTPATIENT)
Dept: MEDICAL GROUP | Facility: MEDICAL CENTER | Age: 80
End: 2020-12-28

## 2020-12-29 ENCOUNTER — HOSPITAL ENCOUNTER (INPATIENT)
Facility: MEDICAL CENTER | Age: 80
LOS: 1 days | DRG: 177 | End: 2020-12-31
Attending: EMERGENCY MEDICINE | Admitting: INTERNAL MEDICINE
Payer: MEDICARE

## 2020-12-29 ENCOUNTER — APPOINTMENT (OUTPATIENT)
Dept: RADIOLOGY | Facility: MEDICAL CENTER | Age: 80
DRG: 177 | End: 2020-12-29
Attending: EMERGENCY MEDICINE
Payer: MEDICARE

## 2020-12-29 ENCOUNTER — PATIENT MESSAGE (OUTPATIENT)
Dept: MEDICAL GROUP | Facility: MEDICAL CENTER | Age: 80
End: 2020-12-29

## 2020-12-29 DIAGNOSIS — J18.9 PNEUMONIA DUE TO INFECTIOUS ORGANISM, UNSPECIFIED LATERALITY, UNSPECIFIED PART OF LUNG: ICD-10-CM

## 2020-12-29 DIAGNOSIS — R06.02 SHORTNESS OF BREATH: ICD-10-CM

## 2020-12-29 DIAGNOSIS — I10 ESSENTIAL HYPERTENSION: ICD-10-CM

## 2020-12-29 PROBLEM — U07.1 PNEUMONIA DUE TO COVID-19 VIRUS: Status: ACTIVE | Noted: 2020-12-29

## 2020-12-29 PROBLEM — E87.6 HYPOKALEMIA: Status: ACTIVE | Noted: 2020-12-29

## 2020-12-29 PROBLEM — J12.82 PNEUMONIA DUE TO COVID-19 VIRUS: Status: ACTIVE | Noted: 2020-12-29

## 2020-12-29 PROBLEM — N17.9 ACUTE KIDNEY INJURY (HCC): Status: ACTIVE | Noted: 2020-12-29

## 2020-12-29 LAB
ALBUMIN SERPL BCP-MCNC: 3.1 G/DL (ref 3.2–4.9)
ALBUMIN/GLOB SERPL: 0.8 G/DL
ALP SERPL-CCNC: 65 U/L (ref 30–99)
ALT SERPL-CCNC: 10 U/L (ref 2–50)
ANION GAP SERPL CALC-SCNC: 14 MMOL/L (ref 7–16)
AST SERPL-CCNC: 15 U/L (ref 12–45)
BASOPHILS # BLD AUTO: 0.4 % (ref 0–1.8)
BASOPHILS # BLD: 0.07 K/UL (ref 0–0.12)
BILIRUB SERPL-MCNC: 0.4 MG/DL (ref 0.1–1.5)
BUN SERPL-MCNC: 24 MG/DL (ref 8–22)
CALCIUM SERPL-MCNC: 8.5 MG/DL (ref 8.4–10.2)
CHLORIDE SERPL-SCNC: 93 MMOL/L (ref 96–112)
CO2 SERPL-SCNC: 24 MMOL/L (ref 20–33)
COVID ORDER STATUS COVID19: NORMAL
CREAT SERPL-MCNC: 1.48 MG/DL (ref 0.5–1.4)
CRP SERPL HS-MCNC: 21.72 MG/DL (ref 0–0.75)
D DIMER PPP IA.FEU-MCNC: 0.7 UG/ML (FEU) (ref 0–0.5)
EKG IMPRESSION: NORMAL
EOSINOPHIL # BLD AUTO: 0.1 K/UL (ref 0–0.51)
EOSINOPHIL NFR BLD: 0.6 % (ref 0–6.9)
ERYTHROCYTE [DISTWIDTH] IN BLOOD BY AUTOMATED COUNT: 44.9 FL (ref 35.9–50)
FLUAV RNA SPEC QL NAA+PROBE: NEGATIVE
FLUBV RNA SPEC QL NAA+PROBE: NEGATIVE
GLOBULIN SER CALC-MCNC: 4.1 G/DL (ref 1.9–3.5)
GLUCOSE SERPL-MCNC: 106 MG/DL (ref 65–99)
HCT VFR BLD AUTO: 36.7 % (ref 37–47)
HGB BLD-MCNC: 12.3 G/DL (ref 12–16)
IMM GRANULOCYTES # BLD AUTO: 0.11 K/UL (ref 0–0.11)
IMM GRANULOCYTES NFR BLD AUTO: 0.7 % (ref 0–0.9)
LACTATE BLD-SCNC: 1.1 MMOL/L (ref 0.5–2)
LYMPHOCYTES # BLD AUTO: 4.55 K/UL (ref 1–4.8)
LYMPHOCYTES NFR BLD: 28.6 % (ref 22–41)
MAGNESIUM SERPL-MCNC: 2 MG/DL (ref 1.5–2.5)
MCH RBC QN AUTO: 32.5 PG (ref 27–33)
MCHC RBC AUTO-ENTMCNC: 33.5 G/DL (ref 33.6–35)
MCV RBC AUTO: 97.1 FL (ref 81.4–97.8)
MONOCYTES # BLD AUTO: 1.61 K/UL (ref 0–0.85)
MONOCYTES NFR BLD AUTO: 10.1 % (ref 0–13.4)
NEUTROPHILS # BLD AUTO: 9.45 K/UL (ref 2–7.15)
NEUTROPHILS NFR BLD: 59.6 % (ref 44–72)
NRBC # BLD AUTO: 0 K/UL
NRBC BLD-RTO: 0 /100 WBC
PLATELET # BLD AUTO: 340 K/UL (ref 164–446)
PMV BLD AUTO: 9.6 FL (ref 9–12.9)
POTASSIUM SERPL-SCNC: 3 MMOL/L (ref 3.6–5.5)
PROCALCITONIN SERPL-MCNC: 0.15 NG/ML
PROT SERPL-MCNC: 7.2 G/DL (ref 6–8.2)
RBC # BLD AUTO: 3.78 M/UL (ref 4.2–5.4)
RSV RNA SPEC QL NAA+PROBE: NEGATIVE
SARS-COV-2 RNA RESP QL NAA+PROBE: DETECTED
SODIUM SERPL-SCNC: 131 MMOL/L (ref 135–145)
SPECIMEN SOURCE: ABNORMAL
WBC # BLD AUTO: 15.9 K/UL (ref 4.8–10.8)

## 2020-12-29 PROCEDURE — 86140 C-REACTIVE PROTEIN: CPT

## 2020-12-29 PROCEDURE — 80053 COMPREHEN METABOLIC PANEL: CPT

## 2020-12-29 PROCEDURE — 84145 PROCALCITONIN (PCT): CPT

## 2020-12-29 PROCEDURE — 700111 HCHG RX REV CODE 636 W/ 250 OVERRIDE (IP): Performed by: EMERGENCY MEDICINE

## 2020-12-29 PROCEDURE — C9803 HOPD COVID-19 SPEC COLLECT: HCPCS | Performed by: EMERGENCY MEDICINE

## 2020-12-29 PROCEDURE — 96367 TX/PROPH/DG ADDL SEQ IV INF: CPT

## 2020-12-29 PROCEDURE — G0378 HOSPITAL OBSERVATION PER HR: HCPCS

## 2020-12-29 PROCEDURE — 700105 HCHG RX REV CODE 258: Performed by: EMERGENCY MEDICINE

## 2020-12-29 PROCEDURE — 71045 X-RAY EXAM CHEST 1 VIEW: CPT

## 2020-12-29 PROCEDURE — 96372 THER/PROPH/DIAG INJ SC/IM: CPT

## 2020-12-29 PROCEDURE — 83605 ASSAY OF LACTIC ACID: CPT

## 2020-12-29 PROCEDURE — 99220 PR INITIAL OBSERVATION CARE,LEVL III: CPT | Performed by: INTERNAL MEDICINE

## 2020-12-29 PROCEDURE — 85025 COMPLETE CBC W/AUTO DIFF WBC: CPT

## 2020-12-29 PROCEDURE — 83735 ASSAY OF MAGNESIUM: CPT

## 2020-12-29 PROCEDURE — 96365 THER/PROPH/DIAG IV INF INIT: CPT

## 2020-12-29 PROCEDURE — 0241U HCHG SARS-COV-2 COVID-19 NFCT DS RESP RNA 4 TRGT MIC: CPT

## 2020-12-29 PROCEDURE — 99285 EMERGENCY DEPT VISIT HI MDM: CPT

## 2020-12-29 PROCEDURE — 93005 ELECTROCARDIOGRAM TRACING: CPT

## 2020-12-29 PROCEDURE — 85379 FIBRIN DEGRADATION QUANT: CPT

## 2020-12-29 PROCEDURE — 93005 ELECTROCARDIOGRAM TRACING: CPT | Performed by: EMERGENCY MEDICINE

## 2020-12-29 PROCEDURE — 87040 BLOOD CULTURE FOR BACTERIA: CPT | Mod: 91

## 2020-12-29 PROCEDURE — 700102 HCHG RX REV CODE 250 W/ 637 OVERRIDE(OP): Performed by: INTERNAL MEDICINE

## 2020-12-29 PROCEDURE — 700105 HCHG RX REV CODE 258: Performed by: INTERNAL MEDICINE

## 2020-12-29 PROCEDURE — A9270 NON-COVERED ITEM OR SERVICE: HCPCS | Performed by: INTERNAL MEDICINE

## 2020-12-29 PROCEDURE — 700111 HCHG RX REV CODE 636 W/ 250 OVERRIDE (IP): Performed by: INTERNAL MEDICINE

## 2020-12-29 RX ORDER — AMOXICILLIN 250 MG
2 CAPSULE ORAL 2 TIMES DAILY
Status: DISCONTINUED | OUTPATIENT
Start: 2020-12-29 | End: 2020-12-31 | Stop reason: HOSPADM

## 2020-12-29 RX ORDER — OMEPRAZOLE 20 MG/1
40 CAPSULE, DELAYED RELEASE ORAL DAILY
Status: DISCONTINUED | OUTPATIENT
Start: 2020-12-30 | End: 2020-12-31 | Stop reason: HOSPADM

## 2020-12-29 RX ORDER — FLUTICASONE PROPIONATE 220 UG/1
1 AEROSOL, METERED RESPIRATORY (INHALATION) 2 TIMES DAILY
Status: DISCONTINUED | OUTPATIENT
Start: 2020-12-29 | End: 2020-12-29

## 2020-12-29 RX ORDER — CLOPIDOGREL BISULFATE 75 MG/1
75 TABLET ORAL DAILY
Status: DISCONTINUED | OUTPATIENT
Start: 2020-12-29 | End: 2020-12-31 | Stop reason: HOSPADM

## 2020-12-29 RX ORDER — AZITHROMYCIN 250 MG/1
500 TABLET, FILM COATED ORAL DAILY
Status: DISCONTINUED | OUTPATIENT
Start: 2020-12-30 | End: 2020-12-30

## 2020-12-29 RX ORDER — OXYCODONE HYDROCHLORIDE 5 MG/1
5 TABLET ORAL
Status: DISCONTINUED | OUTPATIENT
Start: 2020-12-29 | End: 2020-12-31 | Stop reason: HOSPADM

## 2020-12-29 RX ORDER — AZITHROMYCIN 500 MG/1
500 INJECTION, POWDER, LYOPHILIZED, FOR SOLUTION INTRAVENOUS ONCE
Status: COMPLETED | OUTPATIENT
Start: 2020-12-29 | End: 2020-12-29

## 2020-12-29 RX ORDER — ATORVASTATIN CALCIUM 10 MG/1
20 TABLET, FILM COATED ORAL
Status: DISCONTINUED | OUTPATIENT
Start: 2020-12-29 | End: 2020-12-29

## 2020-12-29 RX ORDER — HYDROMORPHONE HYDROCHLORIDE 1 MG/ML
0.25 INJECTION, SOLUTION INTRAMUSCULAR; INTRAVENOUS; SUBCUTANEOUS
Status: DISCONTINUED | OUTPATIENT
Start: 2020-12-29 | End: 2020-12-31 | Stop reason: HOSPADM

## 2020-12-29 RX ORDER — ONDANSETRON 2 MG/ML
4 INJECTION INTRAMUSCULAR; INTRAVENOUS EVERY 4 HOURS PRN
Status: DISCONTINUED | OUTPATIENT
Start: 2020-12-29 | End: 2020-12-31 | Stop reason: HOSPADM

## 2020-12-29 RX ORDER — ACETAMINOPHEN 325 MG/1
650 TABLET ORAL EVERY 6 HOURS PRN
Status: DISCONTINUED | OUTPATIENT
Start: 2020-12-29 | End: 2020-12-31 | Stop reason: HOSPADM

## 2020-12-29 RX ORDER — BISACODYL 10 MG
10 SUPPOSITORY, RECTAL RECTAL
Status: DISCONTINUED | OUTPATIENT
Start: 2020-12-29 | End: 2020-12-31 | Stop reason: HOSPADM

## 2020-12-29 RX ORDER — LABETALOL HYDROCHLORIDE 5 MG/ML
10 INJECTION, SOLUTION INTRAVENOUS EVERY 4 HOURS PRN
Status: DISCONTINUED | OUTPATIENT
Start: 2020-12-29 | End: 2020-12-31 | Stop reason: HOSPADM

## 2020-12-29 RX ORDER — ONDANSETRON 4 MG/1
4 TABLET, ORALLY DISINTEGRATING ORAL EVERY 4 HOURS PRN
Status: DISCONTINUED | OUTPATIENT
Start: 2020-12-29 | End: 2020-12-31 | Stop reason: HOSPADM

## 2020-12-29 RX ORDER — OXYCODONE HYDROCHLORIDE 5 MG/1
2.5 TABLET ORAL
Status: DISCONTINUED | OUTPATIENT
Start: 2020-12-29 | End: 2020-12-31 | Stop reason: HOSPADM

## 2020-12-29 RX ORDER — SODIUM CHLORIDE AND POTASSIUM CHLORIDE 150; 900 MG/100ML; MG/100ML
INJECTION, SOLUTION INTRAVENOUS CONTINUOUS
Status: DISCONTINUED | OUTPATIENT
Start: 2020-12-29 | End: 2020-12-29

## 2020-12-29 RX ORDER — DEXAMETHASONE 4 MG/1
6 TABLET ORAL DAILY
Status: COMPLETED | OUTPATIENT
Start: 2020-12-29 | End: 2020-12-31

## 2020-12-29 RX ORDER — POLYETHYLENE GLYCOL 3350 17 G/17G
1 POWDER, FOR SOLUTION ORAL
Status: DISCONTINUED | OUTPATIENT
Start: 2020-12-29 | End: 2020-12-31 | Stop reason: HOSPADM

## 2020-12-29 RX ORDER — ASPIRIN 325 MG
325 TABLET ORAL DAILY
Status: DISCONTINUED | OUTPATIENT
Start: 2020-12-30 | End: 2020-12-31 | Stop reason: HOSPADM

## 2020-12-29 RX ORDER — HEPARIN SODIUM 5000 [USP'U]/ML
5000 INJECTION, SOLUTION INTRAVENOUS; SUBCUTANEOUS EVERY 8 HOURS
Status: DISCONTINUED | OUTPATIENT
Start: 2020-12-29 | End: 2020-12-31 | Stop reason: HOSPADM

## 2020-12-29 RX ORDER — GUAIFENESIN/DEXTROMETHORPHAN 100-10MG/5
10 SYRUP ORAL EVERY 6 HOURS PRN
Status: DISCONTINUED | OUTPATIENT
Start: 2020-12-29 | End: 2020-12-31 | Stop reason: HOSPADM

## 2020-12-29 RX ORDER — POTASSIUM CHLORIDE 20 MEQ/1
40 TABLET, EXTENDED RELEASE ORAL
Status: ACTIVE | OUTPATIENT
Start: 2020-12-29 | End: 2020-12-29

## 2020-12-29 RX ORDER — BUPROPION HYDROCHLORIDE 150 MG/1
150 TABLET ORAL EVERY MORNING
Status: DISCONTINUED | OUTPATIENT
Start: 2020-12-29 | End: 2020-12-29

## 2020-12-29 RX ORDER — SODIUM CHLORIDE 9 MG/ML
250 INJECTION, SOLUTION INTRAVENOUS ONCE
Status: COMPLETED | OUTPATIENT
Start: 2020-12-29 | End: 2020-12-29

## 2020-12-29 RX ADMIN — HEPARIN SODIUM 5000 UNITS: 5000 INJECTION, SOLUTION INTRAVENOUS; SUBCUTANEOUS at 23:24

## 2020-12-29 RX ADMIN — DEXAMETHASONE 6 MG: 4 TABLET ORAL at 22:16

## 2020-12-29 RX ADMIN — SODIUM CHLORIDE 250 ML: 9 INJECTION, SOLUTION INTRAVENOUS at 17:30

## 2020-12-29 RX ADMIN — AMPICILLIN SODIUM AND SULBACTAM SODIUM 3 G: 2; 1 INJECTION, POWDER, FOR SOLUTION INTRAMUSCULAR; INTRAVENOUS at 15:32

## 2020-12-29 RX ADMIN — AZITHROMYCIN DIHYDRATE 500 MG: 500 INJECTION, POWDER, LYOPHILIZED, FOR SOLUTION INTRAVENOUS at 16:08

## 2020-12-29 RX ADMIN — CLOPIDOGREL BISULFATE 75 MG: 75 TABLET ORAL at 19:00

## 2020-12-29 ASSESSMENT — LIFESTYLE VARIABLES
ON A TYPICAL DAY WHEN YOU DRINK ALCOHOL HOW MANY DRINKS DO YOU HAVE: 0
TOTAL SCORE: 0
HAVE PEOPLE ANNOYED YOU BY CRITICIZING YOUR DRINKING: NO
TOTAL SCORE: 0
HOW MANY TIMES IN THE PAST YEAR HAVE YOU HAD 5 OR MORE DRINKS IN A DAY: 0
EVER FELT BAD OR GUILTY ABOUT YOUR DRINKING: NO
EVER HAD A DRINK FIRST THING IN THE MORNING TO STEADY YOUR NERVES TO GET RID OF A HANGOVER: NO
HAVE YOU EVER FELT YOU SHOULD CUT DOWN ON YOUR DRINKING: NO
TOTAL SCORE: 0
AVERAGE NUMBER OF DAYS PER WEEK YOU HAVE A DRINK CONTAINING ALCOHOL: 0
ALCOHOL_USE: NO
CONSUMPTION TOTAL: NEGATIVE
SUBSTANCE_ABUSE: 0

## 2020-12-29 ASSESSMENT — ENCOUNTER SYMPTOMS
DOUBLE VISION: 0
POLYDIPSIA: 0
PHOTOPHOBIA: 0
COUGH: 1
WEIGHT LOSS: 0
SPEECH CHANGE: 0
TREMORS: 0
BLURRED VISION: 0
NERVOUS/ANXIOUS: 0
VOMITING: 0
BACK PAIN: 0
SPUTUM PRODUCTION: 0
HEADACHES: 0
FLANK PAIN: 0
BRUISES/BLEEDS EASILY: 0
FEVER: 1
HEARTBURN: 0
ORTHOPNEA: 0
HALLUCINATIONS: 0
HEMOPTYSIS: 0
NAUSEA: 0
FOCAL WEAKNESS: 0
NECK PAIN: 0
CHILLS: 1
PALPITATIONS: 0
SHORTNESS OF BREATH: 1

## 2020-12-29 ASSESSMENT — FIBROSIS 4 INDEX: FIB4 SCORE: 1.67

## 2020-12-29 ASSESSMENT — PATIENT HEALTH QUESTIONNAIRE - PHQ9
2. FEELING DOWN, DEPRESSED, IRRITABLE, OR HOPELESS: NOT AT ALL
SUM OF ALL RESPONSES TO PHQ9 QUESTIONS 1 AND 2: 0
1. LITTLE INTEREST OR PLEASURE IN DOING THINGS: NOT AT ALL

## 2020-12-29 NOTE — TELEPHONE ENCOUNTER
From: Randi Bello  To: RAUDEL Cazares  Sent: 12/29/2020 9:40 AM PST  Subject: Non-Urgent Medical Question    Yes , my Daughter will bring me at 2 tomorrow. I was really praying for that low dose steroid presc. Sorry to bug you but my oxygen is 83,84 max. I have no energy to walk my dog,   So sincerely, Randi      ----- Message -----   From:RAUDEL Cazares   Sent:12/28/2020 5:24 PM PST   To:Randi Bello   Subject:RE: Non-Urgent Medical Question    Orion Bryan,  We need to have an appointment to potentially get you set up with oxygen. Your covid test from the 26th was negative which is good. But, the fact that you are still not feeling well is concerning. We may need to send you for an xray. Are you able to come in tomorrow at 2pm?  Monique ARRIETA      ----- Message -----   From:Randi Bello   Sent:12/28/2020 11:16 AM PST   To:RAUDEL Cazares   Subject:Non-Urgent Medical Question    Orion Nazario, So after getting almost back to normal, Somehow I got this dreaded Covid again. I had the test on Saturday and shd get results by tomorrow. Same symptoms, fever occasionally between 99.9 and 101.9.  I cannot get my oxygen level above 87, 88. Am hoping you can help me out again with maybe a double dose of Steroid resc that worked so well last time. I don't know if you can also give presc for oxygen.   I am so desperate right now, Sincerely Randi

## 2020-12-29 NOTE — ED NOTES
Med rec updated and complete  Allergies reviewed  Interviewed pt with daughter at bedside with permission from pt.  Pt reports that she takes an ASPIRIN 325MG every day, but took and extra one today (12/29/2020) @ 0800  Pt reports that she just started taking VITAMIN D3 yesterday (12/28/2020)  Pt reports no antibiotics in the last 2 weeks

## 2020-12-29 NOTE — ED TRIAGE NOTES
Pt presents with daughter from home for fever. Dx covid in november. Had a negative covid swab on Friday but developed a fever and low oxygen level on Saturday.   Pt A&Ox4 and ambulatory. Pt put on 2L - now 93%. Pt masked.

## 2020-12-29 NOTE — ED PROVIDER NOTES
ED Provider Note    Scribed for Felipe Laurent M.D. by Felipe Laurent M.D.. 12/29/2020,  2:40 PM.    CHIEF COMPLAINT  Chief Complaint   Patient presents with   • Fever   • Shortness of Breath       HPI  2:40 PM due to extended wait times in the context of COVID-19, as well as reviewing lab results on patient's still in triage, noted this patient's presentation with hypoxia, and lab results showing significant leukocytosis, requested that she be brought back to her room as soon as possible.  I ordered the balance of the sepsis protocol, as well as appropriate antibiotics.    3:06 PM this patient is being roomed now.    3:15  PM patient evaluated at the bedside.  Randi Bello is a 80 y.o. female who presents to the Emergency Department for shortness of breath and fatigue and fevers.  She says that she started feeling poorly the morning after Leonila, with all of those symptoms.  She is especially concerned because she was diagnosed with COVID-19 in November, however had a negative follow-up test report to her today.  She has not had nausea or vomiting or chest pain.  She has an equivocal sounding diagnosis of COPD.  She is a former heavy smoker.  She does check her oxygen saturations at home, though she does not have supplemental oxygen available.  She has noted oxygen saturations in the mid to low 80s, and her daughter who accompanies her reports that she has appeared pale and yellow, and looks much better now on supplemental oxygen.  She has saturations of 95 to 97% on nasal cannula, 2 L.  She reports feeling better.  Despite the shortness of breath, she denies a cough.      REVIEW OF SYSTEMS  See HPI for further details. All other systems are negative.     PAST MEDICAL HISTORY   has a past medical history of Anesthesia, Anxiety (6/9/2016), Bell palsy (5/14/2015), Chronic fatigue (1/15/2016), CKD (chronic kidney disease) stage 3, GFR 30-59 ml/min (3/3/2018), COPD (chronic obstructive pulmonary  disease) (), Dental disorder, Dyslipidemia (2017), Environmental allergies (2017), GERD (gastroesophageal reflux disease), Hair thinning (1/15/2016), Hiatus hernia syndrome, History of esophageal cancer, Hypertension (2015), Myocardial infarct (HCC) (), Pneumonia (), Post-menopausal osteoporosis (3/3/2018), Primary osteoarthritis involving multiple joints (2017), Raynaud's disease (1/15/2016), and Shingles ().    SOCIAL HISTORY  Social History     Tobacco Use   • Smoking status: Former Smoker     Packs/day: 2.00     Years: 25.00     Pack years: 50.00     Types: Cigarettes     Quit date: 1996     Years since quittin.5   • Smokeless tobacco: Never Used   Substance and Sexual Activity   • Alcohol use: Yes     Alcohol/week: 0.0 oz     Frequency: 2-4 times a month     Drinks per session: 1 or 2     Binge frequency: Never     Comment: once a week   • Drug use: No   • Sexual activity: Never     Social History     Substance and Sexual Activity   Drug Use No       SURGICAL HISTORY   has a past surgical history that includes appendectomy (); other cardiac surgery (); other (); other (); vein ligation radio frequency (2011); carotid endarterectomy (2011); cataract phaco with iol (Left, 2016); and cataract phaco with iol (Right, 11/15/2016).    CURRENT MEDICATIONS  Home Medications     Reviewed by Britany Crowley (Pharmacy Tech) on 20 at 1553  Med List Status: Complete   Medication Last Dose Status   albuterol 108 (90 Base) MCG/ACT Aero Soln inhalation aerosol 2020 Active   amoxicillin-clavulanate (AUGMENTIN) 875-125 MG Tab Not Taking Active   amoxicillin-clavulanate (AUGMENTIN) 875-125 MG Tab Not Taking Active   aspirin (ASA) 325 MG Tab 2020 Active   atorvastatin (LIPITOR) 20 MG Tab Not Taking Active   buPROPion (WELLBUTRIN XL) 150 MG XL tablet Not Taking Active   Cholecalciferol (D3 VITAMIN PO) 2020 Active   clopidogrel  "(PLAVIX) 75 MG Tab 12/28/2020 Active   Ferrous Sulfate (IRON PO) 12/28/2020 Active   fluticasone (FLONASE) 50 MCG/ACT nasal spray Not Taking Active   fluticasone (FLOVENT HFA) 220 MCG/ACT Aerosol Not Taking Active   hydroCHLOROthiazide (HYDRODIURIL) 25 MG Tab 12/28/2020 Active   HYDROcodone-acetaminophen (NORCO) 5-325 MG Tab per tablet 12/29/2020 Active   omeprazole (PRILOSEC) 40 MG delayed-release capsule 12/28/2020 Active   PROAIR  (90 Base) MCG/ACT Aero Soln inhalation aerosol Not Taking Active   Pseudoephedrine-Ibuprofen (ADVIL COLD & SINUS LIQUI-GELS PO) 12/29/2020 Active   triamcinolone acetonide (KENALOG) 0.1 % Cream Not Taking Active                ALLERGIES  Allergies   Allergen Reactions   • Doxycycline Nausea     Pt also experienced Vertigo on this medication.   Pt reports that this medication did not help her       PHYSICAL EXAM  VITAL SIGNS: /60   Pulse 81   Temp (!) 35.7 °C (96.2 °F) (Temporal)   Resp 20   Ht 1.702 m (5' 7\")   Wt 75.7 kg (166 lb 14.2 oz)   SpO2 93%   BMI 26.14 kg/m²   Pulse ox interpretation: I interpret this pulse ox as normal.  Constitutional: Alert in no apparent distress.  HENT: No signs of trauma, Bilateral external ears normal, Nose normal.   Eyes: Conjunctiva normal, Non-icteric.   Neck: Normal range of motion, Supple, No stridor.   Lymphatic: No lymphadenopathy noted.   Cardiovascular: Regular rate and rhythm, no murmurs.   Thorax & Lungs: Diffuse crackles in posterior lung fields, No respiratory distress, No wheezing, No chest tenderness.   Abdomen: Nondistended.  Skin: Warm, Dry, No erythema, No rash.   Extremities: Intact distal pulses, No edema, No cyanosis.  Musculoskeletal: Good range of motion in all major joints. No or major deformities noted.   Neurologic: Alert , Normal motor function, Normal sensory function, No focal deficits noted.   Psychiatric: Affect normal, Judgment normal, Mood normal.     DIAGNOSTIC STUDIES / PROCEDURES    EKG  Interpreted " "by me    SINUS RHYTHM  CONSIDER LEFT ATRIAL ABNORMALITY  BORDERLINE REPOLARIZATION ABNORMALITY  Compared to ECG 10/27/2016 09:00:44  Sinus bradycardia no longer present  Myocardial infarct finding no longer present    LABS  Labs Reviewed   CBC WITH DIFFERENTIAL - Abnormal; Notable for the following components:       Result Value    WBC 15.9 (*)     RBC 3.78 (*)     Hematocrit 36.7 (*)     MCHC 33.5 (*)     Neutrophils (Absolute) 9.45 (*)     Monos (Absolute) 1.61 (*)     All other components within normal limits   COMP METABOLIC PANEL - Abnormal; Notable for the following components:    Sodium 131 (*)     Potassium 3.0 (*)     Chloride 93 (*)     Glucose 106 (*)     Bun 24 (*)     Creatinine 1.48 (*)     Albumin 3.1 (*)     Globulin 4.1 (*)     All other components within normal limits   ESTIMATED GFR - Abnormal; Notable for the following components:    GFR If  41 (*)     GFR If Non  34 (*)     All other components within normal limits   CRP QUANTITIVE (NON-CARDIAC) - Abnormal; Notable for the following components:    Stat C-Reactive Protein 21.72 (*)     All other components within normal limits    Narrative:     Droplet, Contact, and Eye Protection   D-DIMER - Abnormal; Notable for the following components:    D-Dimer Screen 0.70 (*)     All other components within normal limits    Narrative:     Droplet, Contact, and Eye Protection   LACTIC ACID   BLOOD CULTURE    Narrative:     Per Hospital Policy: Only change Specimen Src: to \"Line\" if  specified by physician order.   BLOOD CULTURE    Narrative:     Per Hospital Policy: Only change Specimen Src: to \"Line\" if  specified by physician order.   PROCALCITONIN   MAGNESIUM    Narrative:     Droplet, Contact, and Eye Protection   LACTIC ACID   LACTIC ACID   URINALYSIS   URINE CULTURE(NEW)     All labs reviewed by me.    RADIOLOGY  DX-CHEST-PORTABLE (1 VIEW)   Final Result      Increasing pulmonary interstitial densities consistent with " edema and/or fibrosis        The radiologist's interpretation of all radiological studies have been reviewed by me.    COURSE & MEDICAL DECISION MAKING  Nursing notes, VS, PMSFHx reviewed in chart.     3:05 PM Patient seen and examined at bedside. Differential diagnosis includes but is not limited to bacterial pneumonia, COVID-19 pneumonia, other respiratory virus.  Given that we have lab tests back, showing a leukocytosis of 16, the patient claims of shortness of breath with demonstrated low oxygen saturation, including on arrival, the ACS is unlikely.  She has been treated appropriately with antibiotics for presumed community-acquired pneumonia.  Chest x-ray is still pending.  However, given her significant hypoxia without home oxygen available, it is very unlikely that she will be able to be discharged home before home oxygen can be arranged.    3:53 PM this patient's chest x-ray shows interstitial densities. With her reported fevers and leukocytosis, think this is likely to represent true infiltrate, though there is also the possibility raised of edema/fibrosis.  Given the hypoxia, as discussed, patient will be admitted.  I paged the hospitalist.     4:15 PM Dr. White agrees to admit.     DISPOSITION:  Patient will be admitted to Dr. Foster in stable condition.      FINAL IMPRESSION  1. Community acquired pneumonia  2. Hypoxia

## 2020-12-29 NOTE — PROGRESS NOTES
Spoke with patient by phone.  Strongly encouraged ER urgent care, she is really not wanting to go to either at this time.  I will see her if she can make it to the office anytime today.  Also discussed option for dispatch health.  She will get back to me when she obtains a ride

## 2020-12-30 LAB
ALBUMIN SERPL BCP-MCNC: 3.2 G/DL (ref 3.2–4.9)
ALBUMIN/GLOB SERPL: 0.8 G/DL
ALP SERPL-CCNC: 69 U/L (ref 30–99)
ALT SERPL-CCNC: 12 U/L (ref 2–50)
ANION GAP SERPL CALC-SCNC: 12 MMOL/L (ref 7–16)
AST SERPL-CCNC: 16 U/L (ref 12–45)
BASOPHILS # BLD AUTO: 0.4 % (ref 0–1.8)
BASOPHILS # BLD: 0.06 K/UL (ref 0–0.12)
BILIRUB SERPL-MCNC: 0.6 MG/DL (ref 0.1–1.5)
BUN SERPL-MCNC: 21 MG/DL (ref 8–22)
CALCIUM SERPL-MCNC: 8.8 MG/DL (ref 8.4–10.2)
CHLORIDE SERPL-SCNC: 94 MMOL/L (ref 96–112)
CO2 SERPL-SCNC: 25 MMOL/L (ref 20–33)
CREAT SERPL-MCNC: 1.04 MG/DL (ref 0.5–1.4)
EOSINOPHIL # BLD AUTO: 0.27 K/UL (ref 0–0.51)
EOSINOPHIL NFR BLD: 1.9 % (ref 0–6.9)
ERYTHROCYTE [DISTWIDTH] IN BLOOD BY AUTOMATED COUNT: 45.2 FL (ref 35.9–50)
GLOBULIN SER CALC-MCNC: 3.9 G/DL (ref 1.9–3.5)
GLUCOSE SERPL-MCNC: 128 MG/DL (ref 65–99)
HCT VFR BLD AUTO: 36.6 % (ref 37–47)
HGB BLD-MCNC: 12.5 G/DL (ref 12–16)
IMM GRANULOCYTES # BLD AUTO: 0.09 K/UL (ref 0–0.11)
IMM GRANULOCYTES NFR BLD AUTO: 0.6 % (ref 0–0.9)
LACTATE BLD-SCNC: 1.7 MMOL/L (ref 0.5–2)
LYMPHOCYTES # BLD AUTO: 1.24 K/UL (ref 1–4.8)
LYMPHOCYTES NFR BLD: 8.7 % (ref 22–41)
MCH RBC QN AUTO: 33 PG (ref 27–33)
MCHC RBC AUTO-ENTMCNC: 34.2 G/DL (ref 33.6–35)
MCV RBC AUTO: 96.6 FL (ref 81.4–97.8)
MONOCYTES # BLD AUTO: 0.57 K/UL (ref 0–0.85)
MONOCYTES NFR BLD AUTO: 4 % (ref 0–13.4)
NEUTROPHILS # BLD AUTO: 12.04 K/UL (ref 2–7.15)
NEUTROPHILS NFR BLD: 84.4 % (ref 44–72)
NRBC # BLD AUTO: 0 K/UL
NRBC BLD-RTO: 0 /100 WBC
PLATELET # BLD AUTO: 321 K/UL (ref 164–446)
PMV BLD AUTO: 9.5 FL (ref 9–12.9)
POTASSIUM SERPL-SCNC: 2.9 MMOL/L (ref 3.6–5.5)
PROCALCITONIN SERPL-MCNC: 0.12 NG/ML
PROT SERPL-MCNC: 7.1 G/DL (ref 6–8.2)
RBC # BLD AUTO: 3.79 M/UL (ref 4.2–5.4)
SODIUM SERPL-SCNC: 131 MMOL/L (ref 135–145)
WBC # BLD AUTO: 14.3 K/UL (ref 4.8–10.8)

## 2020-12-30 PROCEDURE — A9270 NON-COVERED ITEM OR SERVICE: HCPCS | Performed by: INTERNAL MEDICINE

## 2020-12-30 PROCEDURE — 85025 COMPLETE CBC W/AUTO DIFF WBC: CPT

## 2020-12-30 PROCEDURE — 80053 COMPREHEN METABOLIC PANEL: CPT

## 2020-12-30 PROCEDURE — 96376 TX/PRO/DX INJ SAME DRUG ADON: CPT

## 2020-12-30 PROCEDURE — 99232 SBSQ HOSP IP/OBS MODERATE 35: CPT | Performed by: INTERNAL MEDICINE

## 2020-12-30 PROCEDURE — 770021 HCHG ROOM/CARE - ISO PRIVATE

## 2020-12-30 PROCEDURE — 83605 ASSAY OF LACTIC ACID: CPT

## 2020-12-30 PROCEDURE — 700102 HCHG RX REV CODE 250 W/ 637 OVERRIDE(OP): Performed by: INTERNAL MEDICINE

## 2020-12-30 PROCEDURE — 96372 THER/PROPH/DIAG INJ SC/IM: CPT

## 2020-12-30 PROCEDURE — 700111 HCHG RX REV CODE 636 W/ 250 OVERRIDE (IP): Performed by: INTERNAL MEDICINE

## 2020-12-30 PROCEDURE — 700105 HCHG RX REV CODE 258: Performed by: INTERNAL MEDICINE

## 2020-12-30 PROCEDURE — 84145 PROCALCITONIN (PCT): CPT

## 2020-12-30 PROCEDURE — 94664 DEMO&/EVAL PT USE INHALER: CPT

## 2020-12-30 RX ORDER — AMOXICILLIN AND CLAVULANATE POTASSIUM 875; 125 MG/1; MG/1
1 TABLET, FILM COATED ORAL EVERY 12 HOURS
Status: DISCONTINUED | OUTPATIENT
Start: 2020-12-30 | End: 2020-12-31 | Stop reason: HOSPADM

## 2020-12-30 RX ORDER — POTASSIUM CHLORIDE 20 MEQ/1
40 TABLET, EXTENDED RELEASE ORAL ONCE
Status: COMPLETED | OUTPATIENT
Start: 2020-12-30 | End: 2020-12-30

## 2020-12-30 RX ADMIN — AMPICILLIN SODIUM AND SULBACTAM SODIUM 3 G: 2; 1 INJECTION, POWDER, FOR SOLUTION INTRAMUSCULAR; INTRAVENOUS at 06:32

## 2020-12-30 RX ADMIN — DEXAMETHASONE 6 MG: 4 TABLET ORAL at 06:33

## 2020-12-30 RX ADMIN — HEPARIN SODIUM 5000 UNITS: 5000 INJECTION, SOLUTION INTRAVENOUS; SUBCUTANEOUS at 19:50

## 2020-12-30 RX ADMIN — CLOPIDOGREL BISULFATE 75 MG: 75 TABLET ORAL at 18:09

## 2020-12-30 RX ADMIN — HEPARIN SODIUM 5000 UNITS: 5000 INJECTION, SOLUTION INTRAVENOUS; SUBCUTANEOUS at 09:19

## 2020-12-30 RX ADMIN — ASPIRIN 325 MG ORAL TABLET 325 MG: 325 PILL ORAL at 06:33

## 2020-12-30 RX ADMIN — POTASSIUM CHLORIDE 40 MEQ: 1500 TABLET, EXTENDED RELEASE ORAL at 10:45

## 2020-12-30 RX ADMIN — OMEPRAZOLE 40 MG: 20 CAPSULE, DELAYED RELEASE ORAL at 06:33

## 2020-12-30 RX ADMIN — AMOXICILLIN AND CLAVULANATE POTASSIUM 1 TABLET: 875; 125 TABLET, FILM COATED ORAL at 18:10

## 2020-12-30 RX ADMIN — AZITHROMYCIN MONOHYDRATE 500 MG: 250 TABLET ORAL at 06:33

## 2020-12-30 ASSESSMENT — ENCOUNTER SYMPTOMS
SHORTNESS OF BREATH: 1
SPUTUM PRODUCTION: 1
COUGH: 1
WEIGHT LOSS: 0
FEVER: 1
CHILLS: 1

## 2020-12-30 NOTE — H&P
Hospital Medicine History & Physical Note    Date of Service  12/29/2020    Primary Care Physician  RAUDEL Cazares    Consultants  None    Code Status  Full Code    Chief Complaint  Chief Complaint   Patient presents with   • Fever   • Shortness of Breath       History of Presenting Illness  80 y.o. female with past medical history of CKD stage III, chronic back pain, GERD, emphysema, Reunaud disease, prediabetes, hypertension who presented 12/29/2020 with complaints of worsening of shortness of breath, generalized weakness, chills and fever up to 102 at home, occasional dry cough.  Patient has been having symptoms since Christmas .  Per chart review, patient was first diagnosed with COVID-19 on 11/28.  Her history is inconsistent.  She had a course of Augmentin and azithromycin.  She received course of Decadron from 12/12 through 12/19.  However, she came today because she has been feeling worse.  She was checking her oxygen at home and and her oxygen noted to be dropping to mid 80s.  She has been taking Advil and Advil.  Of note, COVID-19 on 12/26 was not detected  Chest x-ray showed bilateral pneumonitis.  Blood work significant for potassium 3.0, white blood cells 15.9, worsening of kidney function from baseline.  Procalcitonin 0.15, while white blood cell 15.9.  Patient was started on Unasyn and azithromycin in ER.  Review of Systems  Review of Systems   Constitutional: Positive for chills, fever and malaise/fatigue. Negative for weight loss.   HENT: Negative for ear pain, hearing loss and tinnitus.    Eyes: Negative for blurred vision, double vision and photophobia.   Respiratory: Positive for cough and shortness of breath. Negative for hemoptysis and sputum production.    Cardiovascular: Negative for chest pain, palpitations and orthopnea.   Gastrointestinal: Negative for heartburn, nausea and vomiting.   Genitourinary: Negative for dysuria, flank pain, frequency and hematuria.   Musculoskeletal:  Negative for back pain, joint pain and neck pain.   Skin: Negative for itching and rash.   Neurological: Negative for tremors, speech change, focal weakness and headaches.   Endo/Heme/Allergies: Negative for environmental allergies and polydipsia. Does not bruise/bleed easily.   Psychiatric/Behavioral: Negative for hallucinations and substance abuse. The patient is not nervous/anxious.        Past Medical History   has a past medical history of Anesthesia, Anxiety (6/9/2016), Bell palsy (5/14/2015), Chronic fatigue (1/15/2016), CKD (chronic kidney disease) stage 3, GFR 30-59 ml/min (3/3/2018), COPD (chronic obstructive pulmonary disease) ([facility]), Dental disorder, Dyslipidemia (1/4/2017), Environmental allergies (7/5/2017), GERD (gastroesophageal reflux disease), Hair thinning (1/15/2016), Hiatus hernia syndrome, History of esophageal cancer, Hypertension (4/1/2015), Myocardial infarct ([facility]) (1996), Pneumonia (2007), Post-menopausal osteoporosis (3/3/2018), Primary osteoarthritis involving multiple joints (12/12/2017), Raynaud's disease (1/15/2016), and Shingles (2014).    Surgical History   has a past surgical history that includes appendectomy (1957); other cardiac surgery (1996); other (2007); other (2010); vein ligation radio frequency (2/22/2011); carotid endarterectomy (11/8/2011); cataract phaco with iol (Left, 11/1/2016); and cataract phaco with iol (Right, 11/15/2016).     Family History  family history includes Arthritis in her sister; Cancer in her brother and father.     Social History   reports that she quit smoking about 24 years ago. Her smoking use included cigarettes. She has a 50.00 pack-year smoking history. She has never used smokeless tobacco. She reports current alcohol use. She reports that she does not use drugs.    Allergies  Allergies   Allergen Reactions   • Doxycycline Nausea     Pt also experienced Vertigo on this medication.   Pt reports that this medication did not help her        Medications  Prior to Admission Medications   Prescriptions Last Dose Informant Patient Reported? Taking?   Cholecalciferol (D3 VITAMIN PO) 12/28/2020 at 0900 Patient Yes Yes   Sig: Take 1 Cap by mouth every day.   Ferrous Sulfate (IRON PO) 12/28/2020 at 0900 Patient Yes Yes   Sig: Take 1 Tab by mouth every day.   HYDROcodone-acetaminophen (NORCO) 5-325 MG Tab per tablet 12/29/2020 at 1000 Patient No No   Sig: Take 1 Tab by mouth 2 times a day as needed for up to 30 days.   Patient taking differently: Take 1 Tab by mouth 2 times a day as needed. Indications: Pain   PROAIR  (90 Base) MCG/ACT Aero Soln inhalation aerosol Not Taking at Unknown time Patient No No   Sig: INHALE 2 PUFFS BY MOUTH EVERY 6 HOURS AS NEEDED FOR SHORTNESS OF BREATH.   Patient not taking: Reported on 12/29/2020   Pseudoephedrine-Ibuprofen (ADVIL COLD & SINUS LIQUI-GELS PO) 12/29/2020 at 0700 Patient Yes Yes   Sig: Take 2 Tabs by mouth as needed (For cold symptoms).   albuterol 108 (90 Base) MCG/ACT Aero Soln inhalation aerosol 12/29/2020 at 0800 Patient No No   Sig: Inhale 2 Puffs by mouth every 6 hours as needed for Shortness of Breath.   amoxicillin-clavulanate (AUGMENTIN) 875-125 MG Tab Not Taking at Unknown time Patient No No   Sig: Take 1 Tab by mouth 2 times a day.   Patient not taking: Reported on 12/29/2020   amoxicillin-clavulanate (AUGMENTIN) 875-125 MG Tab Not Taking at Unknown time Patient No No   Sig: Take 1 Tab by mouth 2 times a day.   Patient not taking: Reported on 12/29/2020   aspirin (ASA) 325 MG Tab 12/29/2020 at 0900 Patient Yes No   Sig: Take 325 mg by mouth every day. Pt takes one tablet daily but took and extra ASPIRIN 325MG today   atorvastatin (LIPITOR) 20 MG Tab Not Taking at Unknown time Patient No No   Sig: Take 1 Tab by mouth every day. TAKE 1 TAB BY MOUTH EVERY DAY.   Patient not taking: Reported on 12/29/2020   buPROPion (WELLBUTRIN XL) 150 MG XL tablet Not Taking at Unknown time Patient No No   Sig:  Take 1 Tab by mouth every morning.   Patient not taking: Reported on 12/29/2020   clopidogrel (PLAVIX) 75 MG Tab 12/28/2020 at 0900 Patient No No   Sig: Take 1 Tab by mouth every day.   fluticasone (FLONASE) 50 MCG/ACT nasal spray Not Taking at Unknown time Patient No No   Sig: USE 2 SPRAYS IN NOSE EVERY DAY.   Patient not taking: Reported on 12/29/2020   fluticasone (FLOVENT HFA) 220 MCG/ACT Aerosol Not Taking at Unknown time Patient No No   Sig: Inhale 1 Puff by mouth 2 times a day.   Patient not taking: Reported on 12/29/2020   hydroCHLOROthiazide (HYDRODIURIL) 25 MG Tab 12/28/2020 at 0900 Patient No No   Sig: Take 1 Tab by mouth every day.   omeprazole (PRILOSEC) 40 MG delayed-release capsule 12/28/2020 at 0900 Patient No No   Sig: Take 1 Cap by mouth every day.   triamcinolone acetonide (KENALOG) 0.1 % Cream Not Taking at Unknown time Patient No No   Sig: Apply 2-4 grams to lower back or left lower ankle twice daily as needed   Patient not taking: Reported on 12/29/2020      Facility-Administered Medications: None       Physical Exam  Temp:  [35.7 °C (96.2 °F)] 35.7 °C (96.2 °F)  Pulse:  [81] 81  Resp:  [20] 20  BP: (113)/(60) 113/60  SpO2:  [86 %-93 %] 93 %    Physical Exam  Vitals signs and nursing note reviewed.   Constitutional:       General: She is not in acute distress.     Appearance: Normal appearance.   HENT:      Head: Normocephalic and atraumatic.      Nose: Nose normal.      Mouth/Throat:      Mouth: Mucous membranes are moist.   Eyes:      Extraocular Movements: Extraocular movements intact.      Pupils: Pupils are equal, round, and reactive to light.   Neck:      Musculoskeletal: Normal range of motion and neck supple.   Cardiovascular:      Rate and Rhythm: Normal rate and regular rhythm.   Pulmonary:      Effort: Pulmonary effort is normal.      Breath sounds: Rales present. No wheezing.   Abdominal:      General: Abdomen is flat. There is no distension.      Tenderness: There is no abdominal  tenderness.   Musculoskeletal: Normal range of motion.         General: No swelling or deformity.   Skin:     General: Skin is warm and dry.   Neurological:      General: No focal deficit present.      Mental Status: She is alert and oriented to person, place, and time.   Psychiatric:         Mood and Affect: Mood normal.         Behavior: Behavior normal.         Laboratory:  Recent Labs     12/29/20  1411   WBC 15.9*   RBC 3.78*   HEMOGLOBIN 12.3   HEMATOCRIT 36.7*   MCV 97.1   MCH 32.5   MCHC 33.5*   RDW 44.9   PLATELETCT 340   MPV 9.6     Recent Labs     12/29/20  1411   SODIUM 131*   POTASSIUM 3.0*   CHLORIDE 93*   CO2 24   GLUCOSE 106*   BUN 24*   CREATININE 1.48*   CALCIUM 8.5     Recent Labs     12/29/20  1411   ALTSGPT 10   ASTSGOT 15   ALKPHOSPHAT 65   TBILIRUBIN 0.4   GLUCOSE 106*         No results for input(s): NTPROBNP in the last 72 hours.      No results for input(s): TROPONINT in the last 72 hours.    Imaging:  DX-CHEST-PORTABLE (1 VIEW)   Final Result      Increasing pulmonary interstitial densities consistent with edema and/or fibrosis            Assessment/Plan:  I anticipate this patient is appropriate for observation status at this time.    Pneumonia  Assessment & Plan  Tested positive for COVID-19 on 11/28, tested negative on 12/26.  Will repeat COVID-19 test again  It is unclear if COVID-19 infection still plays a role in her fever, shortness of breath and hypoxia  Patient received course of Decadron from 12/12-12/19.     Will order additional small course of Decadron 3 days due to hypoxia.  We will continue empiric antibiotics and repeat procalcitonin  Incentive spirometer  Wean off oxygen as tolerated    Acute kidney injury (HCC)  Assessment & Plan  Possibly related to taking NSAID.  Avoid NSAID  Gentle IV hydration  Bladder scan  Monitor kidney function    Hypertension- (present on admission)  Assessment & Plan  Will hold hydrochlorothiazide due to worsening of kidney function  IV  labetalol as needed    Hypokalemia  Assessment & Plan  Will be replaced

## 2020-12-30 NOTE — HOSPITAL COURSE
80 y.o. female with past medical history of CKD stage III, chronic back pain, GERD, emphysema, Reunaud disease, prediabetes, hypertension who presented 12/29/2020 with complaints of worsening of shortness of breath, generalized weakness, chills and fever up to 102 at home, occasional dry cough.  Patient has been having symptoms since Christmas .  Per chart review, patient was first diagnosed with COVID-19 on 11/28.  Her history is inconsistent.  She had a course of Augmentin and azithromycin.  She received course of Decadron from 12/12 through 12/19.  However, she came today because she has been feeling worse.  She was checking her oxygen at home and and her oxygen noted to be dropping to mid 80s.  She has been taking Advil and Advil.  Of note, COVID-19 on 12/26 was not detected  Chest x-ray showed bilateral pneumonitis.  Blood work significant for potassium 3.0, white blood cells 15.9, worsening of kidney function from baseline.  Procalcitonin 0.15, while white blood cell 15.9.  Patient was started on Unasyn and azithromycin in ER.

## 2020-12-30 NOTE — ASSESSMENT & PLAN NOTE
Tested positive for COVID-19 on 11/28, tested negative on 12/26.  Per infection control, still not clear.  Symptoms unlikely caused by COVID-19, but by superimposed bacterial infection.  Patient received course of Decadron from 12/12-12/19.    We will continue empiric antibiotics and repeat procalcitonin  Incentive spirometer  Wean off oxygen as tolerated, walk test

## 2020-12-30 NOTE — ASSESSMENT & PLAN NOTE
Possibly related to taking NSAID.  Avoid NSAID  Gentle IV hydration  Bladder scan  Monitor kidney function

## 2020-12-30 NOTE — PROGRESS NOTES
Hospital Medicine Daily Progress Note    Date of Service  12/30/2020    Chief Complaint  Shortness of breath    Hospital Course  80 y.o. female with past medical history of CKD stage III, chronic back pain, GERD, emphysema, Reunaud disease, prediabetes, hypertension who presented 12/29/2020 with complaints of worsening of shortness of breath, generalized weakness, chills and fever up to 102 at home, occasional dry cough.  Patient has been having symptoms since Christmas .  Per chart review, patient was first diagnosed with COVID-19 on 11/28.  Her history is inconsistent.  She had a course of Augmentin and azithromycin.  She received course of Decadron from 12/12 through 12/19.  However, she came today because she has been feeling worse.  She was checking her oxygen at home and and her oxygen noted to be dropping to mid 80s.  She has been taking Advil and Advil.  Of note, COVID-19 on 12/26 was not detected  Chest x-ray showed bilateral pneumonitis.  Blood work significant for potassium 3.0, white blood cells 15.9, worsening of kidney function from baseline.  Procalcitonin 0.15, while white blood cell 15.9.  Patient was started on Unasyn and azithromycin in ER.      Interval Problem Update  Patient was seen and examined by me this morning at bedside, stated overall improvement in symptoms, but still with some shortness of breath and requiring supplemental O2.  Nursing had no overnight events to report.    Patient initially positive for Covid in November, discussed with infection control, not cleared yet, will follow up.     Consultants/Specialty  None    Code Status  Full Code    Disposition  Anticipated DC 24 to 48 hours    Review of Systems  Review of Systems   Constitutional: Positive for chills and fever. Negative for malaise/fatigue and weight loss.   Respiratory: Positive for cough, sputum production and shortness of breath.         Physical Exam  Temp:  [36.1 °C (96.9 °F)-37.1 °C (98.8 °F)] 36.3 °C (97.3  °F)  Pulse:  [61-79] 79  Resp:  [16-20] 20  BP: (123-142)/(57-62) 128/58  SpO2:  [90 %-99 %] 90 %    Physical Exam  Vitals signs and nursing note reviewed.   Constitutional:       General: She is not in acute distress.     Appearance: Normal appearance. She is obese. She is not ill-appearing.   Cardiovascular:      Rate and Rhythm: Normal rate and regular rhythm.      Pulses: Normal pulses.      Heart sounds: Normal heart sounds. No murmur. No gallop.    Pulmonary:      Effort: Pulmonary effort is normal. No respiratory distress.      Breath sounds: Normal breath sounds. No stridor. No wheezing.   Abdominal:      General: Abdomen is flat. Bowel sounds are normal. There is no distension.      Palpations: There is no mass.      Tenderness: There is no abdominal tenderness.   Musculoskeletal:         General: No swelling or deformity.      Right lower leg: No edema.   Neurological:      General: No focal deficit present.      Mental Status: She is alert and oriented to person, place, and time.         Fluids    Intake/Output Summary (Last 24 hours) at 12/30/2020 1432  Last data filed at 12/30/2020 0933  Gross per 24 hour   Intake 550 ml   Output --   Net 550 ml       Laboratory  Recent Labs     12/29/20  1411 12/30/20  0024   WBC 15.9* 14.3*   RBC 3.78* 3.79*   HEMOGLOBIN 12.3 12.5   HEMATOCRIT 36.7* 36.6*   MCV 97.1 96.6   MCH 32.5 33.0   MCHC 33.5* 34.2   RDW 44.9 45.2   PLATELETCT 340 321   MPV 9.6 9.5     Recent Labs     12/29/20  1411 12/30/20  0024   SODIUM 131* 131*   POTASSIUM 3.0* 2.9*   CHLORIDE 93* 94*   CO2 24 25   GLUCOSE 106* 128*   BUN 24* 21   CREATININE 1.48* 1.04   CALCIUM 8.5 8.8                   Imaging  DX-CHEST-PORTABLE (1 VIEW)   Final Result      Increasing pulmonary interstitial densities consistent with edema and/or fibrosis           Assessment/Plan  Pneumonia  Assessment & Plan  Tested positive for COVID-19 on 11/28, tested negative on 12/26.  Per infection control, still not  clear.  Symptoms unlikely caused by COVID-19, but by superimposed bacterial infection.  Patient received course of Decadron from 12/12-12/19.    We will continue empiric antibiotics and repeat procalcitonin  Incentive spirometer  Wean off oxygen as tolerated, walk test    Acute kidney injury (HCC)  Assessment & Plan  Possibly related to taking NSAID.  Avoid NSAID  Gentle IV hydration  Bladder scan  Monitor kidney function    Hypertension- (present on admission)  Assessment & Plan  Will hold hydrochlorothiazide due to worsening of kidney function  IV labetalol as needed    Hypokalemia  Assessment & Plan  Will be replaced       VTE prophylaxis: Lovenox

## 2020-12-30 NOTE — RESPIRATORY CARE
COPD EDUCATION by COPD CLINICAL EDUCATOR  12/30/2020 at 7:27 AM by Munira Vale, RRT     Patient reviewed by COPD education team. Patient does not have a history or diagnosis of COPD and is a former smoker quit 1996.  Therefore does not qualify for the COPD program. PFT 1/2018 FEV1/FVC ratio is normal at 78%,  pt hx seasonal allergies, quit smoking 1996. Pt does use a rescue inhaler occasionally, will look into having another PFT done in future.    REMOTE MONITORING PROGRAM by RESPIRATORY THERAPY  12/30/2020 at 6:39 PM by Munira Vale, RRT     Patient interviewed by RT. Patient was not interested in remote monitoring.

## 2020-12-30 NOTE — FACE TO FACE
"Face to Face Note  -  Durable Medical Equipment    Nav Morris M.D. - NPI: [id number]  I certify that this patient is under my care and that they had a durable medical equipment(DME)face to face encounter by myself that meets the physician DME face-to-face encounter requirements with this patient on:    Date of encounter:   Patient:                    MRN:                       YOB: 2021  Randi Bello  [id number]  1940     The encounter with the patient was in whole, or in part, for the following medical condition, which is the primary reason for durable medical equipment:  Other - Pneumonia    I certify that, based on my findings, the following durable medical equipment is medically necessary:  Oxygen.    HOME O2 Saturation Measurements:(Values must be present for Home Oxygen orders)  Room air sat at rest: 87  Room air sat with amb: 82  With liters of O2: 2, O2 sat at rest with O2: 91  With Liters of O2: 3, O2 sat with amb with O2 : 92  Is the patient mobile?: Yes    My Clinical findings support the need for the above equipment due to:  Hypoxia    Supporting Symptoms: The patient requires supplemental oxygen, as the following interventions have been tried with limited or no improvement: \"Bronchodilators and/or steroid inhalers, \"Ambulation with oximetry and \"Incentive spirometry    If patient feels more short of breath, they can go up to 6 liters per minute and contact healthcare provider.  "

## 2020-12-31 VITALS
HEART RATE: 62 BPM | SYSTOLIC BLOOD PRESSURE: 125 MMHG | BODY MASS INDEX: 26.19 KG/M2 | RESPIRATION RATE: 17 BRPM | WEIGHT: 166.89 LBS | TEMPERATURE: 97.4 F | OXYGEN SATURATION: 92 % | HEIGHT: 67 IN | DIASTOLIC BLOOD PRESSURE: 63 MMHG

## 2020-12-31 PROBLEM — E87.6 HYPOKALEMIA: Status: RESOLVED | Noted: 2020-12-29 | Resolved: 2020-12-31

## 2020-12-31 PROBLEM — N17.9 ACUTE KIDNEY INJURY (HCC): Status: RESOLVED | Noted: 2020-12-29 | Resolved: 2020-12-31

## 2020-12-31 LAB
ANION GAP SERPL CALC-SCNC: 7 MMOL/L (ref 7–16)
BUN SERPL-MCNC: 22 MG/DL (ref 8–22)
CALCIUM SERPL-MCNC: 9.3 MG/DL (ref 8.4–10.2)
CHLORIDE SERPL-SCNC: 104 MMOL/L (ref 96–112)
CO2 SERPL-SCNC: 28 MMOL/L (ref 20–33)
CREAT SERPL-MCNC: 1.01 MG/DL (ref 0.5–1.4)
ERYTHROCYTE [DISTWIDTH] IN BLOOD BY AUTOMATED COUNT: 45.3 FL (ref 35.9–50)
GLUCOSE SERPL-MCNC: 127 MG/DL (ref 65–99)
HCT VFR BLD AUTO: 35.3 % (ref 37–47)
HGB BLD-MCNC: 11.6 G/DL (ref 12–16)
MCH RBC QN AUTO: 32.2 PG (ref 27–33)
MCHC RBC AUTO-ENTMCNC: 32.9 G/DL (ref 33.6–35)
MCV RBC AUTO: 98.1 FL (ref 81.4–97.8)
PLATELET # BLD AUTO: 365 K/UL (ref 164–446)
PMV BLD AUTO: 10.1 FL (ref 9–12.9)
POTASSIUM SERPL-SCNC: 3.9 MMOL/L (ref 3.6–5.5)
RBC # BLD AUTO: 3.6 M/UL (ref 4.2–5.4)
SODIUM SERPL-SCNC: 139 MMOL/L (ref 135–145)
WBC # BLD AUTO: 20.4 K/UL (ref 4.8–10.8)

## 2020-12-31 PROCEDURE — 700102 HCHG RX REV CODE 250 W/ 637 OVERRIDE(OP): Performed by: INTERNAL MEDICINE

## 2020-12-31 PROCEDURE — 99239 HOSP IP/OBS DSCHRG MGMT >30: CPT | Performed by: INTERNAL MEDICINE

## 2020-12-31 PROCEDURE — 80048 BASIC METABOLIC PNL TOTAL CA: CPT

## 2020-12-31 PROCEDURE — A9270 NON-COVERED ITEM OR SERVICE: HCPCS | Performed by: INTERNAL MEDICINE

## 2020-12-31 PROCEDURE — 700111 HCHG RX REV CODE 636 W/ 250 OVERRIDE (IP): Performed by: INTERNAL MEDICINE

## 2020-12-31 PROCEDURE — 85027 COMPLETE CBC AUTOMATED: CPT

## 2020-12-31 RX ORDER — AMOXICILLIN AND CLAVULANATE POTASSIUM 875; 125 MG/1; MG/1
1 TABLET, FILM COATED ORAL EVERY 12 HOURS
Qty: 4 TAB | Refills: 0 | Status: SHIPPED | OUTPATIENT
Start: 2020-12-31 | End: 2021-01-02

## 2020-12-31 RX ADMIN — CLOPIDOGREL BISULFATE 75 MG: 75 TABLET ORAL at 05:09

## 2020-12-31 RX ADMIN — OMEPRAZOLE 40 MG: 20 CAPSULE, DELAYED RELEASE ORAL at 05:09

## 2020-12-31 RX ADMIN — HEPARIN SODIUM 5000 UNITS: 5000 INJECTION, SOLUTION INTRAVENOUS; SUBCUTANEOUS at 12:36

## 2020-12-31 RX ADMIN — AMOXICILLIN AND CLAVULANATE POTASSIUM 1 TABLET: 875; 125 TABLET, FILM COATED ORAL at 05:10

## 2020-12-31 RX ADMIN — ASPIRIN 325 MG ORAL TABLET 325 MG: 325 PILL ORAL at 05:10

## 2020-12-31 RX ADMIN — HEPARIN SODIUM 5000 UNITS: 5000 INJECTION, SOLUTION INTRAVENOUS; SUBCUTANEOUS at 05:10

## 2020-12-31 RX ADMIN — DEXAMETHASONE 6 MG: 4 TABLET ORAL at 05:09

## 2020-12-31 NOTE — DISCHARGE PLANNING
Care Transition Team Assessment    LSW called pt to complete assessment. Confirmed information listed on facesheet. Pt reports she lives alone in an apartment. Pt is completely independent and doesn't require DME. Pt is established with her PCP. Social supports consist of her daughters who live locally.   Discussed DME order. Choice form completed for [facility] for O2.     Information Source  Information Given By: Patient    Elopement Risk  Legal Hold: No  Ambulatory or Self Mobile in Wheelchair: No-Not an Elopement Risk    Interdisciplinary Discharge Planning  Patient or legal guardian wants to designate a caregiver: No    Discharge Preparedness  What is your plan after discharge?: Home with help  What are your discharge supports?: Child  Prior Functional Level: Ambulatory, Independent with Activities of Daily Living, Independent with Medication Management  Difficulity with ADLs: None  Difficulity with IADLs: None    Functional Assesment  Prior Functional Level: Ambulatory, Independent with Activities of Daily Living, Independent with Medication Management    Finances  Financial Barriers to Discharge: No  Prescription Coverage: Yes    Domestic Abuse  Have you ever been the victim of abuse or violence?: No  Physical Abuse or Sexual Abuse: No  Verbal Abuse or Emotional Abuse: No  Possible Abuse/Neglect Reported to:: Not Applicable    Psychological Assessment  History of Substance Abuse: None  History of Psychiatric Problems: No  Non-compliant with Treatment: No  Newly Diagnosed Illness: Yes    Discharge Risks or Barriers  Discharge risks or barriers?: No    Anticipated Discharge Information  Discharge Disposition: Discharged to home/self care (01)

## 2020-12-31 NOTE — PROGRESS NOTES
Received bedside patient report from INES Dominguez. Patient resting comfortably in bed, no complaints at this time. Safety precautions in place. Will continue to monitor.

## 2020-12-31 NOTE — DISCHARGE PLANNING
Agency/Facility Name: [facility]  Spoke To: Praveen  Outcome: Accepted.  Oxygen order is being processed.  Will be delivered within two hours.    DEMARIO Gardiner notifed via Teams.

## 2020-12-31 NOTE — DISCHARGE PLANNING
Received Choice form at [phone]  Agency/Facility Name: [facility]  Referral sent per Choice form @ [phone]

## 2020-12-31 NOTE — DISCHARGE PLANNING
Anticipated Discharge Disposition: Home    Action: Confirmed with RN, pt's O2 was delivered.   LSW spoke with pt who states she needs assistance with transportation home.   Transport form completed and faxed to [facility] for [facility] transport.   MD placed order for home monitoring. Per RT note pt is not interested in remote monitoring. LSW confirmed pt is still not interested.     Barriers to Discharge: None    Plan: D/C home when cleared

## 2020-12-31 NOTE — PROGRESS NOTES
MD wrote order for disharge home with oxygen. Oxygen delivered. AVS printed and gone over with patient. Signed and dated. Personal belongings gathered up and pt. Is dressed and ready for pickup from transportation.

## 2020-12-31 NOTE — DISCHARGE SUMMARY
Discharge Summary    CHIEF COMPLAINT ON ADMISSION  Chief Complaint   Patient presents with   • Fever   • Shortness of Breath       Reason for Admission  Sent by MD     Admission Date  12/29/2020    CODE STATUS  Full Code    HPI & HOSPITAL COURSE  80 y.o. female with past medical history of CKD stage III, chronic back pain, GERD, emphysema, Reunaud disease, prediabetes, hypertension who presented 12/29/2020 with complaints of worsening of shortness of breath, generalized weakness, chills and fever up to 102 at home, occasional dry cough.  Patient has been having symptoms since Christmas .  Per chart review, patient was first diagnosed with COVID-19 on 11/28.  Her history is inconsistent.  She had a course of Augmentin and azithromycin.  She received course of Decadron from 12/12 through 12/19.  However, she came today because she has been feeling worse.  She was checking her oxygen at home and and her oxygen noted to be dropping to mid 80s.  She has been taking Advil and Advil.  Of note, COVID-19 on 12/26 was not detected  Chest x-ray showed bilateral pneumonitis.  Blood work significant for potassium 3.0, white blood cells 15.9, worsening of kidney function from baseline.  Procalcitonin 0.15, while white blood cell 15.9.  Patient was started on Unasyn and azithromycin in ER.  Patient had good overall progression.  She was transitioned to moxifloxacin to complete course of antibiotics for superimposed pneumonia.  She is requiring supplemental O2, and will be discharged with this.  Plan of care and all diagnoses were discussed in detail with patient.      Therefore, she is discharged in good and stable condition to home with close outpatient follow-up.    The patient recovered much more quickly than anticipated on admission.    Discharge Date  12/31/2020    FOLLOW UP ITEMS POST DISCHARGE  Follow-up with PCP    DISCHARGE DIAGNOSES  Active Problems:    Pneumonia POA: Unknown    Hypertension POA: Yes  Resolved  Problems:    Acute kidney injury (HCC) POA: Unknown    Hypokalemia POA: Unknown      FOLLOW UP  No future appointments.  No follow-up provider specified.    MEDICATIONS ON DISCHARGE     Medication List      CHANGE how you take these medications      Instructions   albuterol 108 (90 Base) MCG/ACT Aers inhalation aerosol  What changed: Another medication with the same name was removed. Continue taking this medication, and follow the directions you see here.   Inhale 2 Puffs by mouth every 6 hours as needed for Shortness of Breath.  Dose: 2 Puff     * amoxicillin-clavulanate 875-125 MG Tabs  What changed: Another medication with the same name was changed. Make sure you understand how and when to take each.  Commonly known as: AUGMENTIN   Take 1 Tab by mouth 2 times a day.  Dose: 1 Tab     * amoxicillin-clavulanate 875-125 MG Tabs  What changed: when to take this  Commonly known as: AUGMENTIN   Take 1 Tab by mouth every 12 hours for 2 days.  Dose: 1 Tab         * This list has 2 medication(s) that are the same as other medications prescribed for you. Read the directions carefully, and ask your doctor or other care provider to review them with you.            CONTINUE taking these medications      Instructions   aspirin 325 MG Tabs  Commonly known as: ASA   Take 325 mg by mouth every day. Pt takes one tablet daily but took and extra ASPIRIN 325MG today  Dose: 325 mg     atorvastatin 20 MG Tabs  Commonly known as: LIPITOR   Take 1 Tab by mouth every day. TAKE 1 TAB BY MOUTH EVERY DAY.  Dose: 20 mg     buPROPion 150 MG XL tablet  Commonly known as: WELLBUTRIN XL   Take 1 Tab by mouth every morning.  Dose: 150 mg     clopidogrel 75 MG Tabs  Commonly known as: PLAVIX   Take 1 Tab by mouth every day.  Dose: 75 mg     D3 VITAMIN PO   Take 1 Cap by mouth every day.  Dose: 1 Cap     fluticasone 50 MCG/ACT nasal spray  Commonly known as: FLONASE   USE 2 SPRAYS IN NOSE EVERY DAY.     hydroCHLOROthiazide 25 MG Tabs  Commonly known  as: HYDRODIURIL   Take 1 Tab by mouth every day.  Dose: 25 mg     HYDROcodone-acetaminophen 5-325 MG Tabs per tablet  Commonly known as: Norco   Take 1 Tab by mouth 2 times a day as needed for up to 30 days.  Dose: 1 Tab     IRON PO   Take 1 Tab by mouth every day.  Dose: 1 Tab     omeprazole 40 MG delayed-release capsule  Commonly known as: PRILOSEC   Take 1 Cap by mouth every day.  Dose: 40 mg        STOP taking these medications    ADVIL COLD & SINUS LIQUI-GELS PO     fluticasone 220 MCG/ACT Aero  Commonly known as: FLOVENT HFA     triamcinolone acetonide 0.1 % Crea  Commonly known as: KENALOG            Allergies  Allergies   Allergen Reactions   • Doxycycline Nausea     Pt also experienced Vertigo on this medication.   Pt reports that this medication did not help her       DIET  Orders Placed This Encounter   Procedures   • Diet Order Diet: Regular     Standing Status:   Standing     Number of Occurrences:   1     Order Specific Question:   Diet:     Answer:   Regular [1]       ACTIVITY  As tolerated.  Weight bearing as tolerated    CONSULTATIONS  None    PROCEDURES  None    LABORATORY  Lab Results   Component Value Date    SODIUM 139 12/31/2020    POTASSIUM 3.9 12/31/2020    CHLORIDE 104 12/31/2020    CO2 28 12/31/2020    GLUCOSE 127 (H) 12/31/2020    BUN 22 12/31/2020    CREATININE 1.01 12/31/2020        Lab Results   Component Value Date    WBC 20.4 (H) 12/31/2020    HEMOGLOBIN 11.6 (L) 12/31/2020    HEMATOCRIT 35.3 (L) 12/31/2020    PLATELETCT 365 12/31/2020        Total time of the discharge process exceeds 31 minutes.

## 2020-12-31 NOTE — DISCHARGE INSTRUCTIONS
Discharge Instructions    Discharged to home by medical transportation with escort. Discharged via wheelchair, hospital escort: Yes.  Special equipment needed: Walker    Be sure to schedule a follow-up appointment with your primary care doctor or any specialists as instructed.     Discharge Plan:   Diet Plan: Discussed  Activity Level: Discussed  Confirmed Symptoms Management: Discussed  Medication Reconciliation Updated: Yes  Influenza Vaccine Indication: Patient Refuses    I understand that a diet low in cholesterol, fat, and sodium is recommended for good health. Unless I have been given specific instructions below for another diet, I accept this instruction as my diet prescription.   Other diet: Regular    Special Instructions: None    · Is patient discharged on Warfarin / Coumadin?   No     Depression / Suicide Risk    As you are discharged from this [facility] facility, it is important to learn how to keep safe from harming yourself.    Recognize the warning signs:  · Abrupt changes in personality, positive or negative- including increase in energy   · Giving away possessions  · Change in eating patterns- significant weight changes-  positive or negative  · Change in sleeping patterns- unable to sleep or sleeping all the time   · Unwillingness or inability to communicate  · Depression  · Unusual sadness, discouragement and loneliness  · Talk of wanting to die  · Neglect of personal appearance   · Rebelliousness- reckless behavior  · Withdrawal from people/activities they love  · Confusion- inability to concentrate     If you or a loved one observes any of these behaviors or has concerns about self-harm, here's what you can do:  · Talk about it- your feelings and reasons for harming yourself  · Remove any means that you might use to hurt yourself (examples: pills, rope, extension cords, firearm)  · Get professional help from the community (Mental Health, Substance Abuse, psychological counseling)  · Do not  be alone:Call your Safe Contact- someone whom you trust who will be there for you.  · Call your local CRISIS HOTLINE 120-1056 or 085-604-8404  · Call your local Children's Mobile Crisis Response Team [place] (374) 085-3880 or www.Swiftpage  · Call the toll free National Suicide Prevention Hotlines   · National Suicide Prevention Lifeline 652-206-QYBC (6586)  · National [employer] Line Network 800-SUICIDE (528-9032)

## 2020-12-31 NOTE — DISCHARGE PLANNING
Received Transport Form @ [id number]  Spoke to [facility]    Transport is scheduled for 12/31 @3656-0141 going to Home. [address] [facility],[id number]    Notified Zuleyka HANKS

## 2021-01-03 ENCOUNTER — HOSPITAL ENCOUNTER (OUTPATIENT)
Facility: MEDICAL CENTER | Age: 81
End: 2021-01-03
Attending: NURSE PRACTITIONER
Payer: MEDICARE

## 2021-01-03 LAB
BACTERIA BLD CULT: NORMAL
BACTERIA BLD CULT: NORMAL
COVID ORDER STATUS COVID19: NORMAL
SIGNIFICANT IND 70042: NORMAL
SIGNIFICANT IND 70042: NORMAL
SITE SITE: NORMAL
SITE SITE: NORMAL
SOURCE SOURCE: NORMAL
SOURCE SOURCE: NORMAL

## 2021-01-03 PROCEDURE — U0005 INFEC AGEN DETEC AMPLI PROBE: HCPCS

## 2021-01-03 PROCEDURE — U0003 INFECTIOUS AGENT DETECTION BY NUCLEIC ACID (DNA OR RNA); SEVERE ACUTE RESPIRATORY SYNDROME CORONAVIRUS 2 (SARS-COV-2) (CORONAVIRUS DISEASE [COVID-19]), AMPLIFIED PROBE TECHNIQUE, MAKING USE OF HIGH THROUGHPUT TECHNOLOGIES AS DESCRIBED BY CMS-2020-01-R: HCPCS

## 2021-01-04 ENCOUNTER — PATIENT OUTREACH (OUTPATIENT)
Dept: HEALTH INFORMATION MANAGEMENT | Facility: OTHER | Age: 81
End: 2021-01-04

## 2021-01-04 LAB
SARS-COV-2 RNA RESP QL NAA+PROBE: NOTDETECTED
SPECIMEN SOURCE: NORMAL

## 2021-01-07 ENCOUNTER — TELEPHONE (OUTPATIENT)
Dept: MEDICAL GROUP | Facility: MEDICAL CENTER | Age: 81
End: 2021-01-07

## 2021-01-07 NOTE — TELEPHONE ENCOUNTER
ESTABLISHED PATIENT PRE-VISIT PLANNING     Patient was contacted to complete PVP.     Note: Patient will not be contacted if there is no indication to call.     1.  Reviewed notes from the last few office visits within the medical group: Yes    2.  If any orders were placed at last visit or intended to be done for this visit (i.e. 6 mos follow-up), do we have Results/Consult Notes?         •  Labs - Labs were not ordered at last office visit.  Note: If patient appointment is for lab review and patient did not complete labs, check with provider if OK to reschedule patient until labs completed.       •  Imaging - Imaging was not ordered at last office visit.       •  Referrals - No referrals were ordered at last office visit.    3. Is this appointment scheduled as a Hospital Follow-Up? Yes, visit was at [facility].     4.  Immunizations were updated in Epic using Reconcile Outside Information activity? Yes    5.  Patient is due for the following Health Maintenance Topics:   Health Maintenance Due   Topic Date Due   • IMM DTaP/Tdap/Td Vaccine (1 - Tdap) 07/31/1959   • IMM ZOSTER VACCINES (1 of 2) 07/31/1989   • Annual Pulmonary Function Test / Spirometry  01/10/2019   • Annual Wellness Visit  11/14/2020       - Patient has completed TDAP and SHINGRIX (Shingles) Immunization(s) per [employer]. Chart has been updated.    6.  AHA (Pulse8) form printed for Provider? Yes

## 2021-01-08 ENCOUNTER — OFFICE VISIT (OUTPATIENT)
Dept: MEDICAL GROUP | Facility: MEDICAL CENTER | Age: 81
End: 2021-01-08
Payer: MEDICARE

## 2021-01-08 VITALS
DIASTOLIC BLOOD PRESSURE: 60 MMHG | RESPIRATION RATE: 16 BRPM | SYSTOLIC BLOOD PRESSURE: 132 MMHG | TEMPERATURE: 97.8 F | OXYGEN SATURATION: 94 % | HEIGHT: 66 IN | BODY MASS INDEX: 25.39 KG/M2 | HEART RATE: 69 BPM | WEIGHT: 158 LBS

## 2021-01-08 DIAGNOSIS — I25.10 CORONARY ARTERY DISEASE INVOLVING NATIVE CORONARY ARTERY OF NATIVE HEART WITHOUT ANGINA PECTORIS: ICD-10-CM

## 2021-01-08 DIAGNOSIS — G89.29 CHRONIC MIDLINE THORACIC BACK PAIN: ICD-10-CM

## 2021-01-08 DIAGNOSIS — M54.6 CHRONIC MIDLINE THORACIC BACK PAIN: ICD-10-CM

## 2021-01-08 DIAGNOSIS — Z23 NEED FOR VACCINATION: ICD-10-CM

## 2021-01-08 DIAGNOSIS — J43.1 PANLOBULAR EMPHYSEMA (HCC): ICD-10-CM

## 2021-01-08 DIAGNOSIS — I10 ESSENTIAL HYPERTENSION: ICD-10-CM

## 2021-01-08 DIAGNOSIS — Z00.00 MEDICARE ANNUAL WELLNESS VISIT, SUBSEQUENT: ICD-10-CM

## 2021-01-08 DIAGNOSIS — E78.5 DYSLIPIDEMIA: ICD-10-CM

## 2021-01-08 DIAGNOSIS — J12.82 PNEUMONIA DUE TO COVID-19 VIRUS: ICD-10-CM

## 2021-01-08 DIAGNOSIS — N18.30 STAGE 3 CHRONIC KIDNEY DISEASE, UNSPECIFIED WHETHER STAGE 3A OR 3B CKD: ICD-10-CM

## 2021-01-08 DIAGNOSIS — U07.1 PNEUMONIA DUE TO COVID-19 VIRUS: ICD-10-CM

## 2021-01-08 DIAGNOSIS — R73.03 PREDIABETES: ICD-10-CM

## 2021-01-08 DIAGNOSIS — R73.01 ELEVATED FASTING GLUCOSE: ICD-10-CM

## 2021-01-08 PROBLEM — J18.9 PNEUMONIA: Status: RESOLVED | Noted: 2020-12-29 | Resolved: 2021-01-08

## 2021-01-08 PROBLEM — R30.0 DYSURIA: Status: RESOLVED | Noted: 2020-01-15 | Resolved: 2021-01-08

## 2021-01-08 PROBLEM — R04.0 EPISTAXIS: Status: RESOLVED | Noted: 2020-01-15 | Resolved: 2021-01-08

## 2021-01-08 PROCEDURE — 8041 PR SCP AHA: Performed by: NURSE PRACTITIONER

## 2021-01-08 PROCEDURE — G0439 PPPS, SUBSEQ VISIT: HCPCS | Performed by: NURSE PRACTITIONER

## 2021-01-08 RX ORDER — HYDROCODONE BITARTRATE AND ACETAMINOPHEN 5; 325 MG/1; MG/1
1 TABLET ORAL 2 TIMES DAILY PRN
Qty: 60 TAB | Refills: 0 | Status: SHIPPED | OUTPATIENT
Start: 2021-01-08 | End: 2021-02-07

## 2021-01-08 RX ORDER — ATORVASTATIN CALCIUM 20 MG/1
20 TABLET, FILM COATED ORAL
Qty: 100 TAB | Refills: 3 | Status: SHIPPED | OUTPATIENT
Start: 2021-01-08 | End: 2022-02-04

## 2021-01-08 ASSESSMENT — ENCOUNTER SYMPTOMS: GENERAL WELL-BEING: GOOD

## 2021-01-08 ASSESSMENT — FIBROSIS 4 INDEX: FIB4 SCORE: 1.02

## 2021-01-08 ASSESSMENT — ACTIVITIES OF DAILY LIVING (ADL): BATHING_REQUIRES_ASSISTANCE: 0

## 2021-01-08 ASSESSMENT — PATIENT HEALTH QUESTIONNAIRE - PHQ9: CLINICAL INTERPRETATION OF PHQ2 SCORE: 0

## 2021-01-08 NOTE — ASSESSMENT & PLAN NOTE
Initially developed respiratory illness at the end of November, she was tested for COVID-19 and positive.  She improved with a short course of oral steroids at home but then again worsened eventually requiring hospitalization will for a 2 night stay.  She was discharged after antibiotic therapy and further steroids and reports that she is doing very well since getting home.  She is still using 1.5 L nasal cannula but feels that she will be able to discontinue this soon.  Her activity level is gradually increasing, her energy is much better.  She is eating and drinking well.

## 2021-01-08 NOTE — ASSESSMENT & PLAN NOTE
Chronic issue managed with Flovent although she has not been using it regularly.  She is occasionally using albuterol.

## 2021-01-08 NOTE — NON-PROVIDER
Annual Health Assessment Questions:    1.  Are you currently engaging in any exercise or physical activity? Yes    2.  How would you describe your mood or emotional well-being today? IMPROVING EACH DAY    3.  Have you had any falls in the last year? No    4.  Have you noticed any problems with your balance or had difficulty walking? No    5.  In the last six months have you experienced any leakage of urine? Yes    6. DPA/Advanced Directive: Patient has Durable Power of  on file.    Living Will as well

## 2021-01-08 NOTE — ASSESSMENT & PLAN NOTE
Chronic issue stable with last GFR of 45, she is avoiding NSAIDs.  No change in urine output, no dark urine

## 2021-01-08 NOTE — PROGRESS NOTES
CC:  Wellness exam and follow up medical problems.    HPI:  Randi is an 80-year-old established female patient here for HRA and hospital follow-up  Chronic midline thoracic back pain  This is been a persistent problem with intermittent flares, for detailed history please see note dated 11/15/2019.  She is rarely using Norco, no more than 2 tablets a day.  She avoids NSAID use due to chronic kidney disease.  She is getting adequate pain relief with medication, no concerning side effects including constipation.   report reviewed, no aberrant behavior    CKD (chronic kidney disease) stage 3, GFR 30-59 ml/min  Chronic issue stable with last GFR of 45, she is avoiding NSAIDs.  No change in urine output, no dark urine    Coronary artery disease involving native coronary artery of native heart without angina pectoris  History of MI more than 20 years ago.  She continues with daily aspirin, Plavix.  It appears that she has discontinued atorvastatin, ran out of refills and forgot to have this requested.  We will restart this today.  No recent chest pain, activity intolerance    Dyslipidemia  As discussed above    Hypertension  Blood pressure is stable on hydrochlorothiazide.  No chest pain, dizziness, palpitation    Panlobular emphysema (HCC)  Chronic issue managed with Flovent although she has not been using it regularly.  She is occasionally using albuterol.    Prediabetes  Last a1c 6.1, no medication at this time.     Pneumonia due to COVID-19 virus  Initially developed respiratory illness at the end of November, she was tested for COVID-19 and positive.  She improved with a short course of oral steroids at home but then again worsened eventually requiring hospitalization will for a 2 night stay.  She was discharged after antibiotic therapy and further steroids and reports that she is doing very well since getting home.  She is still using 1.5 L nasal cannula but feels that she will be able to discontinue this soon.   Her activity level is gradually increasing, her energy is much better.  She is eating and drinking well.    Depression Screening    Little interest or pleasure in doing things?  0 - not at all  Feeling down, depressed , or hopeless? 0 - not at all  Patient Health Questionnaire Score: 0     If depressive symptoms identified deferred to follow up visit unless specifically addressed in assessment and plan.    Interpretation of PHQ-9 Total Score   Score Severity   1-4 No Depression   5-9 Mild Depression   10-14 Moderate Depression   15-19 Moderately Severe Depression   20-27 Severe Depression    Screening for Cognitive Impairment    Three Minute Recall (river, nation, finger) 3/3    Sotero clock face with all 12 numbers and set the hands to show 10 past 11.  Yes    Cognitive concerns identified deferred for follow up unless specifically addressed in assessment and plan.    Fall Risk Assessment    Has the patient had two or more falls in the last year or any fall with injury in the last year?  No    Safety Assessment    Throw rugs on floor.  No  Handrails on all stairs.  No  Good lighting in all hallways.  Yes  Difficulty hearing.  Yes  Patient counseled about all safety risks that were identified.    Functional Assessment ADLs    Are there any barriers preventing you from cooking for yourself or meeting nutritional needs?  No.    Are there any barriers preventing you from driving safely or obtaining transportation?  No.    Are there any barriers preventing you from using a telephone or calling for help?  No.    Are there any barriers preventing you from shopping?  No.    Are there any barriers preventing you from taking care of your own finances?  No.    Are there any barriers preventing you from managing your medications?  No.    Are there any barriers preventing you from showering, bathing or dressing yourself?  No.    Are you currently engaging in any exercise or physical activity?  Yes.  WALKING  What is your  perception of your health?  Good.      Health Maintenance Summary                COVID-19 Vaccine Overdue 7[birth year]31/1956     IMM DTaP/Tdap/Td Vaccine Overdue 7/31/1958     Annual Pulmonary Function Test / Spirometry Overdue 1/10/2019      Done 1/10/2018 PFT DICTATED RESULTS    IMM ZOSTER VACCINES Overdue 10/9/2019      Done 8/14/2019 Imm Admin: Zoster Vaccine Recombinant (RZV) (SHINGRIX)    BONE DENSITY Next Due 6/30/2021      Done 6/30/2016 DS-BONE DENSITY STUDY (DEXA)    Annual Wellness Visit Next Due 1/9/2022      Done 1/8/2021 Visit Dx: Medicare annual wellness visit, subsequent     Patient has more history with this topic...          Patient Care Team:  RAUDEL Cazares as PCP - General (Family Medicine)  Jose Art M.D. as Consulting Physician (Gastroenterology)  Atul Aviles Ass't as      See chart problem list or referrals section for names of specialist.    Patient Active Problem List    Diagnosis Date Noted   • Pneumonia due to COVID-19 virus 01/08/2021   • Chronic midline thoracic back pain 08/14/2019   • Suprapubic pressure 01/28/2019   • Panlobular emphysema (HCC) 01/28/2019   • Coronary artery disease involving native coronary artery of native heart without angina pectoris 01/28/2019   • Prediabetes 01/28/2019   • Post-menopausal osteoporosis 03/03/2018   • CKD (chronic kidney disease) stage 3, GFR 30-59 ml/min 03/03/2018   • History of esophageal cancer    • Dental disorder    • Primary osteoarthritis involving multiple joints 12/12/2017   • Environmental allergies 07/05/2017   • Dyslipidemia 01/04/2017   • Situational anxiety 06/09/2016   • Gastroesophageal reflux disease without esophagitis 06/09/2016   • Raynaud's disease 01/15/2016   • Chronic fatigue 01/15/2016   • History of Bell's palsy 05/14/2015   • Hypertension 04/01/2015   • History of MI (myocardial infarction) 01/29/2015       Current Outpatient Medications on File Prior to Visit   Medication Sig  Dispense Refill   • Ferrous Sulfate (IRON PO) Take 1 Tab by mouth every day.     • Cholecalciferol (D3 VITAMIN PO) Take 1 Cap by mouth every day.     • hydroCHLOROthiazide (HYDRODIURIL) 25 MG Tab Take 1 Tab by mouth every day. 90 Tab 1   • omeprazole (PRILOSEC) 40 MG delayed-release capsule Take 1 Cap by mouth every day. 90 Cap 1   • clopidogrel (PLAVIX) 75 MG Tab Take 1 Tab by mouth every day. 90 Tab 3   • albuterol 108 (90 Base) MCG/ACT Aero Soln inhalation aerosol Inhale 2 Puffs by mouth every 6 hours as needed for Shortness of Breath. 8.5 g 3   • aspirin (ASA) 325 MG Tab Take 325 mg by mouth every day. Pt takes one tablet daily but took and extra ASPIRIN 325MG today       No current facility-administered medications on file prior to visit.        Doxycycline    Social History     Socioeconomic History   • Marital status: Single     Spouse name: Not on file   • Number of children: Not on file   • Years of education: Not on file   • Highest education level: Not on file   Occupational History   • Not on file   Social Needs   • Financial resource strain: Not on file   • Food insecurity     Worry: Never true     Inability: Never true   • Transportation needs     Medical: No     Non-medical: No   Tobacco Use   • Smoking status: Former Smoker     Packs/day: 2.00     Years: 25.00     Pack years: 50.00     Types: Cigarettes     Quit date: 1996     Years since quittin.6   • Smokeless tobacco: Never Used   Substance and Sexual Activity   • Alcohol use: Yes     Alcohol/week: 0.0 oz     Frequency: 2-4 times a month     Drinks per session: 1 or 2     Binge frequency: Never     Comment: once a week   • Drug use: No   • Sexual activity: Never   Lifestyle   • Physical activity     Days per week: Not on file     Minutes per session: Not on file   • Stress: Not on file   Relationships   • Social connections     Talks on phone: Not on file     Gets together: Not on file     Attends [employer] service: Not on file     Active  "member of club or organization: Not on file     Attends meetings of clubs or organizations: Not on file     Relationship status: Not on file   • Intimate partner violence     Fear of current or ex partner: Not on file     Emotionally abused: Not on file     Physically abused: Not on file     Forced sexual activity: Not on file   Other Topics Concern   • Not on file   Social History Narrative    Works at [employer], lives in [place], , lives alone with daughters nearby       Family History   Problem Relation Age of Onset   • Cancer Father         lung, smoker   • Cancer Brother         kidney, liver   • Arthritis Sister         Rheumatoid Arthritis       Past Surgical History:   Procedure Laterality Date   • CATARACT PHACO WITH IOL Right 11/15/2016    Procedure: CATARACT PHACO WITH IOL;  Surgeon: Gian Bruno M.D.;  Location: SURGERY SURGICAL [facility] ORS;  Service:    • CATARACT PHACO WITH IOL Left 11/1/2016    Procedure: CATARACT PHACO WITH IOL;  Surgeon: Gian Bruno M.D.;  Location: SURGERY SURGICAL [facility] ORS;  Service:    • CAROTID ENDARTERECTOMY  11/8/2011    Performed by JB MIN at SURGERY [facility]   • VEIN LIGATION RADIO FREQUENCY  2/22/2011    Performed by JB MIN at SURGERY [facility]   • OTHER  2010    matri stem   • OTHER  2007    EGD to remove esophageal polyp   • OTHER CARDIAC SURGERY  1996    angioplasty   • APPENDECTOMY  1957    appendectomy       ROS:  Denies any Headache, Blurred Vision, Confusion Chest pain,  Shortness of breath,  Abdominal pain, Changes of bowel or bladder, Lower ext edema, Fevers, Nights sweats, Weight Changes, Focal weakness or numbness.  All other systems are negative.    PHYSICAL:    /60 (BP Location: Right arm, Patient Position: Sitting, BP Cuff Size: Adult)   Pulse 69   Temp 36.6 °C (97.8 °F) (Temporal)   Resp 16   Ht 1.676 m (5' 6\")   Wt 71.7 kg (158 lb)   SpO2 94%     Gen:         Alert and oriented, No apparent " distress.  HEENT:   Perrla, TM clear,  Oralpharynx no erythema or exudates.  Neck:       No Jugular venous distension, Lymphadenopathy, Thyromegaly, Bruits.  Lungs:     Clear to auscultation bilaterally  CV:          Regular rate and rhythm. No murmurs, rubs or gallops.  Abd:         Soft non tender, non distended. Normal active bowel sounds. No Hepatosplenomegaly, No pulsatile masses.                       Ext:          No clubbing, cyanosis, edema.  Neuro:     CN II-XII grossly intact.  Strength 5/5 throughout.  DTR's equal and                   symmetrical both upper and lower extremities. Down going Babinski.    MS:          Full range motion all joints no major deformities.  Skin:        No concerning lesions or rashes.    Health Maintenance Due   Topic Date Due   • COVID-19 Vaccine (1 of 2) 07/31/1956   • IMM DTaP/Tdap/Td Vaccine (1 - Tdap) 07/31/1958   • Annual Pulmonary Function Test / Spirometry  01/10/2019   • IMM ZOSTER VACCINES (2 of 2) 10/09/2019         ASSESSMENT AND PLAN:  Screening test reviewed with patient today and updated within health-care maintenance record. Discussion today about general wellness and lifestyle habits.      1. Medicare annual wellness visit, subsequent  Problems to medications reviewed and updated.  Preventative health measures discussed.    2. Dyslipidemia  Stable  - atorvastatin (LIPITOR) 20 MG Tab; Take 1 Tab by mouth every day. TAKE 1 TAB BY MOUTH EVERY DAY.  Dispense: 100 Tab; Refill: 3  - Lipid Profile; Future  - Comp Metabolic Panel; Future    3. Elevated fasting glucose  Last A1c 6.1, counseled on diet and exercise recommendations  - HEMOGLOBIN A1C; Future    4. Chronic midline thoracic back pain  Stable at this time with occasional use of Norco, she is avoiding NSAIDs.  Risks and side effects reviewed, consent for opiate prescription reviewed and signed.   report reviewed, I determined this prescription to be medically necessary  - Consent for Opiate  Prescription  - HYDROcodone-acetaminophen (NORCO) 5-325 MG Tab per tablet; Take 1 Tab by mouth 2 times a day as needed for up to 30 days.  Dispense: 60 Tab; Refill: 0    5. Stage 3 chronic kidney disease, unspecified whether stage 3a or 3b CKD  Stable, continue to monitor    6. Coronary artery disease involving native coronary artery of native heart without angina pectoris  Continue statin    7. Essential hypertension  Stable    8. Panlobular emphysema (HCC)  Stable at this time, encouraged more consistent use of Flovent    9. Prediabetes  As discussed above    10. Pneumonia due to COVID-19 virus  Resolved, she continues to recover well.  May discontinue home oxygen anytime      AHA form completed

## 2021-01-08 NOTE — ASSESSMENT & PLAN NOTE
This is been a persistent problem with intermittent flares, for detailed history please see note dated 11/15/2019.  She is rarely using Norco, no more than 2 tablets a day.  She avoids NSAID use due to chronic kidney disease.  She is getting adequate pain relief with medication, no concerning side effects including constipation.   report reviewed, no aberrant behavior

## 2021-01-08 NOTE — ASSESSMENT & PLAN NOTE
History of MI more than 20 years ago.  She continues with daily aspirin, Plavix.  It appears that she has discontinued atorvastatin, ran out of refills and forgot to have this requested.  We will restart this today.  No recent chest pain, activity intolerance

## 2021-01-13 ENCOUNTER — PATIENT MESSAGE (OUTPATIENT)
Dept: MEDICAL GROUP | Facility: MEDICAL CENTER | Age: 81
End: 2021-01-13

## 2021-01-14 NOTE — TELEPHONE ENCOUNTER
"From: Randi Bello  To: RAUDEL Cazares  Sent: 1/13/2021 5:52 PM PST  Subject: Non-Urgent Medical Question    Orion Nazario, I did go back in and tried a few times, it just said \"no times or dates available\". I was only concerned about my still being on oxygen and if that should be a reason to hold off . I will call the number you gave. The My Chart website could use an over hauling, dallas Nazario      ----- Message -----   From:RAUDEL Cazares   Sent:1/13/2021 3:03 PM PST   To:Randi Bello   Subject:RE: Non-Urgent Medical Question    Orion Bryan,  I am not sure if that was a technical problem. Did you go back in and try to start over?  We do not have the shots here in the clinic, they are scheduled at a different location. If you still have trouble doing it online you can call the main phone number to schedule [phone].  Monique ARRIETA      ----- Message -----   From:Randi Bello   Sent:1/13/2021 11:09 AM PST   To:RAUDEL Cazares   Subject:Non-Urgent Medical Question    Orion Nazario, I just tried to make appt for covid vaccine. It was ok on the 17th and it asked if I had a question. When I asked if it affects the shot due to still being on oxygen, it kicked me out and said no dates available. Can you help me with this, or can I come in and get shot from you? Randi Pearson  "

## 2021-01-29 ENCOUNTER — PATIENT MESSAGE (OUTPATIENT)
Dept: MEDICAL GROUP | Facility: MEDICAL CENTER | Age: 81
End: 2021-01-29

## 2021-01-29 DIAGNOSIS — R42 DIZZINESS: ICD-10-CM

## 2021-01-31 NOTE — TELEPHONE ENCOUNTER
From: Randi Bello  To: RAUDEL Cazares  Sent: 1/29/2021 5:37 PM PST  Subject: Non-Urgent Medical Question    Mansi, yes you suggested that I do lab by march. I am more than happy to do them earlier. Please let me know and I will go in next week if you like.  Thanks so much. Kelly      ----- Message -----   From:RAUDEL Cazares   Sent:1/29/2021 3:09 PM PST   To:Randi Bello   Subject:RE: Non-Urgent Medical Question    Orion Mendoza,  Glad to hear that the oxygen level has been improving. Recovery time can be so unpredictable, but it does sound like you are on the right track. I know that I had ordered some labs for you, did we talk about a specific time for you to complete that? I would like to add on your blood counts to be sure that they are looking okay, do you were a little anemic during your hospital stay.  Monique ARRIETA      ----- Message -----   From:Randi Bello   Sent:1/29/2021 12:45 PM PST   To:RAUDEL Cazares   Subject:Non-Urgent Medical Question    Justin, it's the pest again. lol.   I have improved a lot regarding oxygen use, holding at 92, 93 most of the day, 96, 97 at times.  Am doing everything right regarding meds and supplements as well as Emergen C , etc  My problem is lack of energy. I take several walks daily and at times feel dizzy, but continue on. I realize that some people have these same symptoms that are also recovering from Covid, but am anxious to get back to my OLD self.  Any suggestions ?  So sincerely, Kelly

## 2021-02-02 ENCOUNTER — HOSPITAL ENCOUNTER (OUTPATIENT)
Dept: LAB | Facility: MEDICAL CENTER | Age: 81
End: 2021-02-02
Attending: NURSE PRACTITIONER
Payer: MEDICARE

## 2021-02-02 DIAGNOSIS — R42 DIZZINESS: ICD-10-CM

## 2021-02-02 DIAGNOSIS — R73.01 ELEVATED FASTING GLUCOSE: ICD-10-CM

## 2021-02-02 DIAGNOSIS — E78.5 DYSLIPIDEMIA: ICD-10-CM

## 2021-02-02 LAB
ALBUMIN SERPL BCP-MCNC: 3.9 G/DL (ref 3.2–4.9)
ALBUMIN/GLOB SERPL: 1.1 G/DL
ALP SERPL-CCNC: 87 U/L (ref 30–99)
ALT SERPL-CCNC: 9 U/L (ref 2–50)
ANION GAP SERPL CALC-SCNC: 12 MMOL/L (ref 7–16)
AST SERPL-CCNC: 17 U/L (ref 12–45)
BASOPHILS # BLD AUTO: 0.8 % (ref 0–1.8)
BASOPHILS # BLD: 0.09 K/UL (ref 0–0.12)
BILIRUB SERPL-MCNC: 0.6 MG/DL (ref 0.1–1.5)
BUN SERPL-MCNC: 15 MG/DL (ref 8–22)
CALCIUM SERPL-MCNC: 9.7 MG/DL (ref 8.5–10.5)
CHLORIDE SERPL-SCNC: 104 MMOL/L (ref 96–112)
CHOLEST SERPL-MCNC: 140 MG/DL (ref 100–199)
CO2 SERPL-SCNC: 25 MMOL/L (ref 20–33)
CREAT SERPL-MCNC: 1.14 MG/DL (ref 0.5–1.4)
EOSINOPHIL # BLD AUTO: 0.12 K/UL (ref 0–0.51)
EOSINOPHIL NFR BLD: 1 % (ref 0–6.9)
ERYTHROCYTE [DISTWIDTH] IN BLOOD BY AUTOMATED COUNT: 50.1 FL (ref 35.9–50)
EST. AVERAGE GLUCOSE BLD GHB EST-MCNC: 134 MG/DL
FASTING STATUS PATIENT QL REPORTED: NORMAL
GLOBULIN SER CALC-MCNC: 3.5 G/DL (ref 1.9–3.5)
GLUCOSE SERPL-MCNC: 107 MG/DL (ref 65–99)
HBA1C MFR BLD: 6.3 % (ref 0–5.6)
HCT VFR BLD AUTO: 42.6 % (ref 37–47)
HDLC SERPL-MCNC: 36 MG/DL
HGB BLD-MCNC: 13.9 G/DL (ref 12–16)
IMM GRANULOCYTES # BLD AUTO: 0.06 K/UL (ref 0–0.11)
IMM GRANULOCYTES NFR BLD AUTO: 0.5 % (ref 0–0.9)
LDLC SERPL CALC-MCNC: 77 MG/DL
LYMPHOCYTES # BLD AUTO: 4.09 K/UL (ref 1–4.8)
LYMPHOCYTES NFR BLD: 35 % (ref 22–41)
MCH RBC QN AUTO: 33.1 PG (ref 27–33)
MCHC RBC AUTO-ENTMCNC: 32.6 G/DL (ref 33.6–35)
MCV RBC AUTO: 101.4 FL (ref 81.4–97.8)
MONOCYTES # BLD AUTO: 0.89 K/UL (ref 0–0.85)
MONOCYTES NFR BLD AUTO: 7.6 % (ref 0–13.4)
NEUTROPHILS # BLD AUTO: 6.45 K/UL (ref 2–7.15)
NEUTROPHILS NFR BLD: 55.1 % (ref 44–72)
NRBC # BLD AUTO: 0 K/UL
NRBC BLD-RTO: 0 /100 WBC
PLATELET # BLD AUTO: 341 K/UL (ref 164–446)
PMV BLD AUTO: 11.1 FL (ref 9–12.9)
POTASSIUM SERPL-SCNC: 3.5 MMOL/L (ref 3.6–5.5)
PROT SERPL-MCNC: 7.4 G/DL (ref 6–8.2)
RBC # BLD AUTO: 4.2 M/UL (ref 4.2–5.4)
SODIUM SERPL-SCNC: 141 MMOL/L (ref 135–145)
TRIGL SERPL-MCNC: 137 MG/DL (ref 0–149)
WBC # BLD AUTO: 11.7 K/UL (ref 4.8–10.8)

## 2021-02-02 PROCEDURE — 80053 COMPREHEN METABOLIC PANEL: CPT

## 2021-02-02 PROCEDURE — 80061 LIPID PANEL: CPT

## 2021-02-02 PROCEDURE — 36415 COLL VENOUS BLD VENIPUNCTURE: CPT

## 2021-02-02 PROCEDURE — 85025 COMPLETE CBC W/AUTO DIFF WBC: CPT

## 2021-02-02 PROCEDURE — 83036 HEMOGLOBIN GLYCOSYLATED A1C: CPT

## 2021-02-03 ENCOUNTER — IMMUNIZATION (OUTPATIENT)
Dept: FAMILY PLANNING/WOMEN'S HEALTH CLINIC | Facility: IMMUNIZATION CENTER | Age: 81
End: 2021-02-03
Attending: INTERNAL MEDICINE
Payer: MEDICARE

## 2021-02-03 DIAGNOSIS — Z23 ENCOUNTER FOR VACCINATION: Primary | ICD-10-CM

## 2021-02-03 DIAGNOSIS — Z23 NEED FOR VACCINATION: ICD-10-CM

## 2021-02-03 PROCEDURE — 0001A PFIZER SARS-COV-2 VACCINE: CPT

## 2021-02-03 PROCEDURE — 91300 PFIZER SARS-COV-2 VACCINE: CPT

## 2021-02-24 ENCOUNTER — IMMUNIZATION (OUTPATIENT)
Dept: FAMILY PLANNING/WOMEN'S HEALTH CLINIC | Facility: IMMUNIZATION CENTER | Age: 81
End: 2021-02-24
Attending: INTERNAL MEDICINE
Payer: MEDICARE

## 2021-02-24 DIAGNOSIS — Z23 ENCOUNTER FOR VACCINATION: Primary | ICD-10-CM

## 2021-02-24 PROCEDURE — 0002A PFIZER SARS-COV-2 VACCINE: CPT

## 2021-02-24 PROCEDURE — 91300 PFIZER SARS-COV-2 VACCINE: CPT

## 2021-03-01 DIAGNOSIS — K21.9 GASTROESOPHAGEAL REFLUX DISEASE WITHOUT ESOPHAGITIS: ICD-10-CM

## 2021-03-02 ENCOUNTER — PATIENT MESSAGE (OUTPATIENT)
Dept: MEDICAL GROUP | Facility: MEDICAL CENTER | Age: 81
End: 2021-03-02

## 2021-03-02 RX ORDER — OMEPRAZOLE 40 MG/1
CAPSULE, DELAYED RELEASE ORAL
Qty: 90 CAPSULE | Refills: 0 | Status: SHIPPED | OUTPATIENT
Start: 2021-03-02 | End: 2021-06-01

## 2021-03-02 NOTE — TELEPHONE ENCOUNTER
Received request via: Pharmacy    Was the patient seen in the last year in this department? Yes    Does the patient have an active prescription (recently filled or refills available) for medication(s) requested? No     Last OV 1/8/21.

## 2021-03-09 ENCOUNTER — PATIENT MESSAGE (OUTPATIENT)
Dept: MEDICAL GROUP | Facility: MEDICAL CENTER | Age: 81
End: 2021-03-09

## 2021-03-09 ENCOUNTER — OFFICE VISIT (OUTPATIENT)
Dept: MEDICAL GROUP | Facility: MEDICAL CENTER | Age: 81
End: 2021-03-09
Payer: MEDICARE

## 2021-03-09 VITALS
HEIGHT: 65 IN | SYSTOLIC BLOOD PRESSURE: 140 MMHG | HEART RATE: 65 BPM | DIASTOLIC BLOOD PRESSURE: 60 MMHG | RESPIRATION RATE: 20 BRPM | WEIGHT: 162.92 LBS | BODY MASS INDEX: 27.14 KG/M2 | TEMPERATURE: 97.5 F | OXYGEN SATURATION: 95 %

## 2021-03-09 DIAGNOSIS — M54.6 CHRONIC MIDLINE THORACIC BACK PAIN: ICD-10-CM

## 2021-03-09 DIAGNOSIS — G93.31 POSTVIRAL FATIGUE SYNDROME: ICD-10-CM

## 2021-03-09 DIAGNOSIS — J32.1 CHRONIC FRONTAL SINUSITIS: ICD-10-CM

## 2021-03-09 DIAGNOSIS — Z13.29 THYROID DISORDER SCREEN: ICD-10-CM

## 2021-03-09 DIAGNOSIS — G89.29 CHRONIC MIDLINE THORACIC BACK PAIN: ICD-10-CM

## 2021-03-09 DIAGNOSIS — Z13.21 ENCOUNTER FOR VITAMIN DEFICIENCY SCREENING: ICD-10-CM

## 2021-03-09 PROBLEM — J32.9 CHRONIC SINUSITIS, UNSPECIFIED: Status: ACTIVE | Noted: 2021-03-09

## 2021-03-09 PROCEDURE — 99214 OFFICE O/P EST MOD 30 MIN: CPT | Performed by: NURSE PRACTITIONER

## 2021-03-09 RX ORDER — HYDROCODONE BITARTRATE AND ACETAMINOPHEN 10; 325 MG/1; MG/1
1-2 TABLET ORAL EVERY 6 HOURS PRN
COMMUNITY
End: 2021-03-10

## 2021-03-09 RX ORDER — AMOXICILLIN AND CLAVULANATE POTASSIUM 875; 125 MG/1; MG/1
1 TABLET, FILM COATED ORAL 2 TIMES DAILY
Qty: 20 TABLET | Refills: 0 | Status: SHIPPED | OUTPATIENT
Start: 2021-03-09 | End: 2021-08-12

## 2021-03-09 ASSESSMENT — FIBROSIS 4 INDEX: FIB4 SCORE: 1.35

## 2021-03-09 NOTE — ASSESSMENT & PLAN NOTE
This is a new problem. Pt c/o fatigue spells since last November.   She had covid back on November and since then, she feels fatigue with activities like vacuuming, moping or swiping the floors every day.   Pt feels that she is forgetting things more often in the past few months since last November.   Denies fatigue when walking or going up a set of stairs, SOB, n/v, constipation, dry skin, tingling to hands or feet.  No chest pain, palpitation, diaphoresis reported.

## 2021-03-09 NOTE — NON-PROVIDER
Randi is here to follow up on the following concerns:     Postviral fatigue syndrome  This is a new problem. Pt c/o fatigue spells since last November.   She had covid back on November and since then, she feels fatigue with activities like vacuuming, moping or swiping the floors every day.   Pt feels that she is forgetting things more often in the past few months since last November.   Denies fatigue when walking or going up a set of stairs, SOB, n/v, constipation, dry skin, tingling to hands or feet.      Chronic frontal sinusitis  Maxillary and orbital sinuses pressure for the past 2 weeks.   10 days ago, orbital sinus pressure was so severe while she was walking, she had an episode of blurred vision in which she had to rest for 5 minutes before continuing her walk. She was able to finish her walk w/o problems.   Associated symptoms R ear pain w/o drainage or hearing problems, fatigue, 1x episode of blurred vision.   Denies any cough, fever, chills, focal or generalized weakness, HA.   She has tried Claritin x1 week and Flonase w/o improvement.      1. Encounter for vitamin deficiency screening  Hx of fatigue with certain acitivities like vacuuming or sweeping the floor since last november. Denies any SOB, fatigue when walking, palpitations, n/v, CP.   I have placed the following order to evaluate for possible anemia due to vitamin deficiency.   - VIT B12,  FOLIC ACID  - IRON/TOTAL IRON BIND; Future    2. Postviral fatigue syndrome  I have placed the following orders to screen for an anemia etiology as the cause of her fatigue.   - VIT B12,  FOLIC ACID  - CBC WITH DIFFERENTIAL; Future  - IRON/TOTAL IRON BIND; Future    3. Thyroid disorder screen  I have placed the following orders to screen for a thyroid etiology as the cause of her fatigue. She denies any problems with constipation, dry skin but has noticed an increased in hair loss since last November after she recovered from covid.   - TSH WITH REFLEX TO FT4;  Future    4. Chronic frontal sinusitis  Pansinusitis x 2 weeks. Associated symptoms of a 1x episoe of blurred vision.   She was able to finish her walk w/o any problem.   Associated R ear pain w/o drainage or hearing problems.   She denies any focal or general weakness, problem swallowing, cough, fevers, chills.   This is a chronic issue that usually gets resolve with Augmentin. Due to the time lapse of her symptoms I have placed the following orders.   I educated patient of how and when to take medications, SE and to take them with probiotics.   - amoxicillin-clavulanate (AUGMENTIN) 875-125 MG Tab; Take 1 tablet by mouth 2 times a day.  Dispense: 20 tablet; Refill: 0

## 2021-03-09 NOTE — PROGRESS NOTES
Subjective:     Chief Complaint   Patient presents with   • Advice Only     recurring back pain, low energy level, dizzy spells     Randi Bello is a 80 y.o. female here today to follow up on:    Chronic sinusitis, unspecified  Maxillary and orbital sinuses pressure for the past 2 weeks.   10 days ago, orbital sinus pressure was so severe while she was walking, she had an episode of blurred vision in which she had to rest for 5 minutes before continuing her walk. She was able to finish her walk w/o problems.   Associated symptoms R ear pain w/o drainage or hearing problems, fatigue, 1x episode of blurred vision.   Denies any cough, fever, chills, focal or generalized weakness, HA.   She has tried Claritin x1 week and Flonase w/o improvement.      Postviral fatigue syndrome  This is a new problem. Pt c/o fatigue spells since last November.   She had covid back on November and since then, she feels fatigue with activities like vacuuming, moping or swiping the floors every day.   Pt feels that she is forgetting things more often in the past few months since last November.   Denies fatigue when walking or going up a set of stairs, SOB, n/v, constipation, dry skin, tingling to hands or feet.  No chest pain, palpitation, diaphoresis reported.         Current medicines (including changes today)  Current Outpatient Medications   Medication Sig Dispense Refill   • HYDROcodone/acetaminophen (NORCO)  MG Tab Take 1-2 Tablets by mouth every 6 hours as needed.     • amoxicillin-clavulanate (AUGMENTIN) 875-125 MG Tab Take 1 tablet by mouth 2 times a day. 20 tablet 0   • omeprazole (PRILOSEC) 40 MG delayed-release capsule TAKE 1 CAPSULE BY MOUTH EVERY DAY 90 capsule 0   • atorvastatin (LIPITOR) 20 MG Tab Take 1 Tab by mouth every day. TAKE 1 TAB BY MOUTH EVERY DAY. 100 Tab 3   • Ferrous Sulfate (IRON PO) Take 1 Tab by mouth every day.     • Cholecalciferol (D3 VITAMIN PO) Take 1 Cap by mouth every day.     •  "hydroCHLOROthiazide (HYDRODIURIL) 25 MG Tab Take 1 Tab by mouth every day. 90 Tab 1   • clopidogrel (PLAVIX) 75 MG Tab Take 1 Tab by mouth every day. 90 Tab 3   • albuterol 108 (90 Base) MCG/ACT Aero Soln inhalation aerosol Inhale 2 Puffs by mouth every 6 hours as needed for Shortness of Breath. 8.5 g 3   • aspirin (ASA) 325 MG Tab Take 325 mg by mouth every day. Pt takes one tablet daily but took and extra ASPIRIN 325MG today       No current facility-administered medications for this visit.     She  has a past medical history of Anesthesia, Anxiety (6/9/2016), Bell palsy (5/14/2015), Chronic fatigue (1/15/2016), CKD (chronic kidney disease) stage 3, GFR 30-59 ml/min (3/3/2018), COPD (chronic obstructive pulmonary disease) ([facility]), Dental disorder, Dyslipidemia (1/4/2017), Environmental allergies (7/5/2017), GERD (gastroesophageal reflux disease), Hair thinning (1/15/2016), Hiatus hernia syndrome, History of esophageal cancer, Hypertension (4/1/2015), Myocardial infarct ([facility]) (1996), Pneumonia (2007), Post-menopausal osteoporosis (3/3/2018), Primary osteoarthritis involving multiple joints (12/12/2017), Raynaud's disease (1/15/2016), and Shingles (2014).    ROS included above     Objective:     /60 (BP Location: Right arm, Patient Position: Sitting, BP Cuff Size: Adult)   Pulse 65   Temp 36.4 °C (97.5 °F) (Temporal)   Resp 20   Ht 1.66 m (5' 5.35\")   Wt 73.9 kg (162 lb 14.7 oz)   SpO2 95%  Body mass index is 26.82 kg/m².     Physical Exam:  General: Alert, oriented in no acute distress.  Eye contact is good, speech is normal, affect calm  HEENT: Tenderness over maxillary and frontal sinuses bilaterally.  Right ear canal with erythema, TM with good landmarks, no effusion.  Right TM normal..  Lungs: clear to auscultation bilaterally, normal effort, no wheeze/ rhonchi/ rales.  CV: regular rate and rhythm, S1, S2, no murmur  Abdomen: soft, nontender  Ext: no edema, color normal, vascularity normal, " temperature normal    Assessment and Plan:   The following treatment plan was discussed she is     1. Encounter for vitamin deficiency screening  Hx of fatigue with certain acitivities like vacuuming or sweeping the floor since last november. Denies any SOB, fatigue when walking, palpitations, n/v, CP.   I have placed the following order to evaluate for possible anemia due to vitamin deficiency.   - VIT B12,  FOLIC ACID  - IRON/TOTAL IRON BIND; Future     2. Postviral fatigue syndrome  I have placed the following orders to screen for underlying cause to her fatigue.  We have discussed that this can be a common complaint post COVID-19.  She is not exhibiting any increase in cough, shortness of breath, diaphoresis or activity intolerance that would indicate cardiopulmonary source at this time.  - VIT B12,  FOLIC ACID  - CBC WITH DIFFERENTIAL; Future  - IRON/TOTAL IRON BIND; Future     3. Thyroid disorder screen  I have placed the following orders to screen for a thyroid etiology as the cause of her fatigue. She denies any problems with constipation, dry skin but has noticed an increased in hair loss since last November after she recovered from covid.   - TSH WITH REFLEX TO FT4; Future     4. Chronic frontal sinusitis  Pansinusitis x 2 weeks. Associated symptoms of a 1x episoe of blurred vision.   She was able to finish her walk w/o any problem.   Associated R ear pain w/o drainage or hearing problems.   She denies any focal or general weakness, problem swallowing, cough, fevers, chills.   This is a chronic issue that usually gets resolve with Augmentin. Due to the time lapse of her symptoms I have placed the following orders.   I educated patient of how and when to take medications, SE and to take them with probiotics.   - amoxicillin-clavulanate (AUGMENTIN) 875-125 MG Tab; Take 1 tablet by mouth 2 times a day.  Dispense: 20 tablet; Refill: 0    Followup: pending labs         Please note that this dictation was  created using voice recognition software. I have worked with consultants from the vendor as well as technical experts from [facility] to optimize the interface. I have made every reasonable attempt to correct obvious errors, but I expect that there are errors of grammar and possibly content that I did not discover before finalizing the note.

## 2021-03-09 NOTE — TELEPHONE ENCOUNTER
From: Randi Bello  To: Nurse Practicioner Mansi Thakur  Sent: 3/9/2021 1:34 PM PST  Subject: Non-Urgent Medical Question    Orion Nazario, I left a phone msg earlier regarding [facility]. They said they did not rec'v a presc. for the Hydrocodone acet. Hoping you can follow up on that.  I sincerely appreciate the time you spent with me today along with your Trainee.  Thanks again, Kelly

## 2021-03-09 NOTE — ASSESSMENT & PLAN NOTE
Maxillary and orbital sinuses pressure for the past 2 weeks.   10 days ago, orbital sinus pressure was so severe while she was walking, she had an episode of blurred vision in which she had to rest for 5 minutes before continuing her walk. She was able to finish her walk w/o problems.   Associated symptoms R ear pain w/o drainage or hearing problems, fatigue, 1x episode of blurred vision.   Denies any cough, fever, chills, focal or generalized weakness, HA.   She has tried Claritin x1 week and Flonase w/o improvement.

## 2021-03-10 RX ORDER — HYDROCODONE BITARTRATE AND ACETAMINOPHEN 5; 325 MG/1; MG/1
1 TABLET ORAL 2 TIMES DAILY PRN
Qty: 60 TABLET | Refills: 0 | Status: SHIPPED | OUTPATIENT
Start: 2021-03-10 | End: 2021-04-09

## 2021-03-11 ENCOUNTER — PATIENT MESSAGE (OUTPATIENT)
Dept: MEDICAL GROUP | Facility: MEDICAL CENTER | Age: 81
End: 2021-03-11

## 2021-03-11 DIAGNOSIS — J43.1 PANLOBULAR EMPHYSEMA (HCC): ICD-10-CM

## 2021-03-11 RX ORDER — FLUTICASONE PROPIONATE 220 UG/1
1 AEROSOL, METERED RESPIRATORY (INHALATION) 2 TIMES DAILY
Qty: 12 G | Refills: 5 | Status: SHIPPED | OUTPATIENT
Start: 2021-03-11

## 2021-03-12 ENCOUNTER — HOSPITAL ENCOUNTER (OUTPATIENT)
Dept: LAB | Facility: MEDICAL CENTER | Age: 81
End: 2021-03-12
Attending: NURSE PRACTITIONER
Payer: MEDICARE

## 2021-03-12 DIAGNOSIS — Z13.29 THYROID DISORDER SCREEN: ICD-10-CM

## 2021-03-12 DIAGNOSIS — Z13.21 ENCOUNTER FOR VITAMIN DEFICIENCY SCREENING: ICD-10-CM

## 2021-03-12 DIAGNOSIS — G93.31 POSTVIRAL FATIGUE SYNDROME: ICD-10-CM

## 2021-03-12 LAB
BASOPHILS # BLD AUTO: 0.7 % (ref 0–1.8)
BASOPHILS # BLD: 0.08 K/UL (ref 0–0.12)
EOSINOPHIL # BLD AUTO: 0.14 K/UL (ref 0–0.51)
EOSINOPHIL NFR BLD: 1.3 % (ref 0–6.9)
ERYTHROCYTE [DISTWIDTH] IN BLOOD BY AUTOMATED COUNT: 48.1 FL (ref 35.9–50)
FOLATE SERPL-MCNC: 8 NG/ML
HCT VFR BLD AUTO: 43 % (ref 37–47)
HGB BLD-MCNC: 13.9 G/DL (ref 12–16)
IMM GRANULOCYTES # BLD AUTO: 0.03 K/UL (ref 0–0.11)
IMM GRANULOCYTES NFR BLD AUTO: 0.3 % (ref 0–0.9)
IRON SATN MFR SERPL: 35 % (ref 15–55)
IRON SERPL-MCNC: 97 UG/DL (ref 40–170)
LYMPHOCYTES # BLD AUTO: 4.43 K/UL (ref 1–4.8)
LYMPHOCYTES NFR BLD: 39.7 % (ref 22–41)
MCH RBC QN AUTO: 32.6 PG (ref 27–33)
MCHC RBC AUTO-ENTMCNC: 32.3 G/DL (ref 33.6–35)
MCV RBC AUTO: 100.7 FL (ref 81.4–97.8)
MONOCYTES # BLD AUTO: 0.79 K/UL (ref 0–0.85)
MONOCYTES NFR BLD AUTO: 7.1 % (ref 0–13.4)
NEUTROPHILS # BLD AUTO: 5.7 K/UL (ref 2–7.15)
NEUTROPHILS NFR BLD: 50.9 % (ref 44–72)
NRBC # BLD AUTO: 0 K/UL
NRBC BLD-RTO: 0 /100 WBC
PLATELET # BLD AUTO: 299 K/UL (ref 164–446)
PMV BLD AUTO: 11 FL (ref 9–12.9)
RBC # BLD AUTO: 4.27 M/UL (ref 4.2–5.4)
TIBC SERPL-MCNC: 278 UG/DL (ref 250–450)
TSH SERPL DL<=0.005 MIU/L-ACNC: 1.22 UIU/ML (ref 0.38–5.33)
UIBC SERPL-MCNC: 181 UG/DL (ref 110–370)
VIT B12 SERPL-MCNC: 425 PG/ML (ref 211–911)
WBC # BLD AUTO: 11.2 K/UL (ref 4.8–10.8)

## 2021-03-12 PROCEDURE — 85025 COMPLETE CBC W/AUTO DIFF WBC: CPT

## 2021-03-12 PROCEDURE — 83550 IRON BINDING TEST: CPT

## 2021-03-12 PROCEDURE — 83540 ASSAY OF IRON: CPT

## 2021-03-12 PROCEDURE — 36415 COLL VENOUS BLD VENIPUNCTURE: CPT

## 2021-03-12 PROCEDURE — 84443 ASSAY THYROID STIM HORMONE: CPT

## 2021-03-12 PROCEDURE — 82746 ASSAY OF FOLIC ACID SERUM: CPT

## 2021-03-12 PROCEDURE — 82607 VITAMIN B-12: CPT

## 2021-03-20 ENCOUNTER — HOSPITAL ENCOUNTER (OUTPATIENT)
Dept: RADIOLOGY | Facility: MEDICAL CENTER | Age: 81
End: 2021-03-20
Attending: FAMILY MEDICINE
Payer: MEDICARE

## 2021-03-20 ENCOUNTER — OFFICE VISIT (OUTPATIENT)
Dept: URGENT CARE | Facility: PHYSICIAN GROUP | Age: 81
End: 2021-03-20
Payer: MEDICARE

## 2021-03-20 VITALS
OXYGEN SATURATION: 95 % | SYSTOLIC BLOOD PRESSURE: 142 MMHG | TEMPERATURE: 97.5 F | DIASTOLIC BLOOD PRESSURE: 72 MMHG | WEIGHT: 160 LBS | BODY MASS INDEX: 25.71 KG/M2 | HEIGHT: 66 IN | HEART RATE: 79 BPM

## 2021-03-20 DIAGNOSIS — S20.212A CONTUSION OF RIB ON LEFT SIDE, INITIAL ENCOUNTER: ICD-10-CM

## 2021-03-20 PROCEDURE — 99214 OFFICE O/P EST MOD 30 MIN: CPT | Performed by: FAMILY MEDICINE

## 2021-03-20 PROCEDURE — 71101 X-RAY EXAM UNILAT RIBS/CHEST: CPT | Mod: LT

## 2021-03-20 ASSESSMENT — FIBROSIS 4 INDEX: FIB4 SCORE: 1.54

## 2021-03-20 NOTE — PROGRESS NOTES
"Subjective:         Chief Complaint   Patient presents with   • Rib Injury     x 2 days poss right side                    Rib Pain       Pt c/o sharp, constant left lower ribcage pain x 2 days after she was adjusted by chiropractor for unrelated issue.      Pain is constant and worse with deep breathing.       Pain not improved with motrin.        denies dyspnea.       Pertinent negatives include no claudication, cough, exertional chest pressure, fever, nausea, near-syncope, numbness, syncope or vomiting.           Past Medical History:   Diagnosis Date   • Anesthesia     Once she had too much anesthesia \"didnt come out of it for 6hours\"   • Anxiety 2016   • Bell palsy 2015   • Chronic fatigue 1/15/2016   • CKD (chronic kidney disease) stage 3, GFR 30-59 ml/min 3/3/2018   • COPD (chronic obstructive pulmonary disease) ([facility])    • Dental disorder     dentures   • Dyslipidemia 2017   • Environmental allergies 2017   • GERD (gastroesophageal reflux disease)    • Hair thinning 1/15/2016   • Hiatus hernia syndrome    • History of esophageal cancer     esophageal cancer polyp. Repeat endoscopy 3-4 years later was benign.    • Hypertension 2015   • Myocardial infarct ([facility])    • Pneumonia    • Post-menopausal osteoporosis 3/3/2018   • Primary osteoarthritis involving multiple joints 2017   • Raynaud's disease 1/15/2016   • Shingles          Social History     Tobacco Use   • Smoking status: Former Smoker     Packs/day: 2.00     Years: 25.00     Pack years: 50.00     Types: Cigarettes     Quit date: 1996     Years since quittin.8   • Smokeless tobacco: Never Used   Substance Use Topics   • Alcohol use: Yes     Alcohol/week: 0.0 oz     Comment: once a week   • Drug use: No         Family history was reviewed and not pertinent       Review of Systems:  Review of Systems   Constitutional: Negative for fever.   HENT: no neck pain, headache or dizziness  Eyes: denies vision " "changes  Respiratory: no cough, congestion, SOB  Cardiovascular: denies palpations     Gastrointestinal: denies diarrhea, abdominal pain or constipation.  No blood in stool.  Musculoskeletal: denies back pain or joint pain    Skin: no itching or rash  Neurological: No numbness or tingling.   10 point ROS otherwise negative, except per HPI         Objective:     /72 (BP Location: Left arm, Patient Position: Sitting, BP Cuff Size: Adult)   Pulse 79   Temp 36.4 °C (97.5 °F) (Temporal)   Ht 1.676 m (5' 6\")   Wt 72.6 kg (160 lb)   SpO2 95%       Physical Exam   Constitutional: pt is oriented to person, place, and time. Pt appears well-developed and well-nourished.   HENT:   Head: Normocephalic and atraumatic.   Mouth/Throat: Oropharynx is clear and moist.   Eyes: Conjunctivae and EOM are normal. Pupils are equal, round, and reactive to light. No scleral icterus.   Neck: Normal range of motion. Neck supple. No JVD present. No thyromegaly present.   Cardiovascular: Normal rate, regular rhythm, normal heart sounds and intact distal pulses.  Exam reveals no gallop and no friction rub.    No murmur heard.  Pulmonary/Chest: Effort normal and breath sounds normal. No respiratory distress. Pt has no wheezes. Pt has no rales. Pt exhibits point tenderness over several lower ribs on left anterior  side   Abdominal: Soft.   Musculoskeletal: Normal range of motion. Pt exhibits no edema.   Lymphadenopathy:     Pt has no cervical adenopathy.   Neurological: pt is alert and oriented to person, place, and time. No cranial nerve deficit.   Skin: Skin is warm and dry. No erythema.   Psychiatric: pt has a normal mood and affect. patient's behavior is normal.          Reading Physician Reading Date Result Priority   Devyn Funk M.D.  [phone] 3/20/2021 Ur[facility]      Narrative & Impression        3/20/2021 12:48 PM     HISTORY/REASON FOR EXAM: Left-sided anterior rib pain after chiropractor adjustment 3 days " ago.        TECHNIQUE/EXAM DESCRIPTION AND NUMBER OF VIEWS:  5 images of the left ribs and chest.     COMPARISON: CXR 12/29/2020  A BB marker was placed at the site of pain which projects over the anterolateral aspect of the left 9th rib.     FINDINGS:  No displaced fractures or acute bony changes are noted.  There is no evidence of a hemo or pneumothorax.     IMPRESSION:     Negative left rib series.       Assessment/Plan:     1. Contusion of rib on left side, initial encounter  X-rays were personally reviewed by myself.   There is no rib fracture         - [id number] (WITH 1-VIEW CXR) LEFT; Future  - diclofenac sodium 1 % Gel; Apply 4 g topically every 8 hours as needed.  Dispense: 100 g; Refill: 0    Follow up in one week if no improvement, sooner if symptoms worsen.         A total of 30 minutes were spent with the patient during this encounter taking history, physical, placing orders, chart review and imaging interpretation.

## 2021-04-29 ENCOUNTER — OFFICE VISIT (OUTPATIENT)
Dept: MEDICAL GROUP | Facility: MEDICAL CENTER | Age: 81
End: 2021-04-29
Payer: MEDICARE

## 2021-04-29 ENCOUNTER — HOSPITAL ENCOUNTER (OUTPATIENT)
Facility: MEDICAL CENTER | Age: 81
End: 2021-04-29
Attending: NURSE PRACTITIONER
Payer: MEDICARE

## 2021-04-29 VITALS
OXYGEN SATURATION: 96 % | HEART RATE: 62 BPM | DIASTOLIC BLOOD PRESSURE: 70 MMHG | TEMPERATURE: 97.2 F | BODY MASS INDEX: 27.55 KG/M2 | RESPIRATION RATE: 20 BRPM | SYSTOLIC BLOOD PRESSURE: 140 MMHG | WEIGHT: 165.34 LBS | HEIGHT: 65 IN

## 2021-04-29 DIAGNOSIS — R35.0 URINARY FREQUENCY: ICD-10-CM

## 2021-04-29 DIAGNOSIS — L82.1 SEBORRHEIC KERATOSIS: ICD-10-CM

## 2021-04-29 LAB
APPEARANCE UR: CLEAR
BILIRUB UR STRIP-MCNC: NORMAL MG/DL
COLOR UR AUTO: YELLOW
GLUCOSE UR STRIP.AUTO-MCNC: NORMAL MG/DL
KETONES UR STRIP.AUTO-MCNC: NORMAL MG/DL
LEUKOCYTE ESTERASE UR QL STRIP.AUTO: NORMAL
NITRITE UR QL STRIP.AUTO: NORMAL
PH UR STRIP.AUTO: 6.5 [PH] (ref 5–8)
PROT UR QL STRIP: NORMAL MG/DL
RBC UR QL AUTO: NORMAL
SP GR UR STRIP.AUTO: 1.01
UROBILINOGEN UR STRIP-MCNC: 0.2 MG/DL

## 2021-04-29 PROCEDURE — 17110 DESTRUCTION B9 LES UP TO 14: CPT | Performed by: NURSE PRACTITIONER

## 2021-04-29 PROCEDURE — 87086 URINE CULTURE/COLONY COUNT: CPT

## 2021-04-29 PROCEDURE — 81002 URINALYSIS NONAUTO W/O SCOPE: CPT | Performed by: NURSE PRACTITIONER

## 2021-04-29 PROCEDURE — 99213 OFFICE O/P EST LOW 20 MIN: CPT | Mod: 25 | Performed by: NURSE PRACTITIONER

## 2021-04-29 PROCEDURE — 87186 SC STD MICRODIL/AGAR DIL: CPT | Mod: 91

## 2021-04-29 PROCEDURE — 87077 CULTURE AEROBIC IDENTIFY: CPT | Mod: 91

## 2021-04-29 RX ORDER — NITROFURANTOIN 25; 75 MG/1; MG/1
100 CAPSULE ORAL 2 TIMES DAILY
Qty: 10 CAPSULE | Refills: 0 | Status: SHIPPED | OUTPATIENT
Start: 2021-04-29 | End: 2021-08-12

## 2021-04-29 ASSESSMENT — FIBROSIS 4 INDEX: FIB4 SCORE: 1.54

## 2021-04-29 NOTE — PROGRESS NOTES
Subjective:     Chief Complaint   Patient presents with   • Advice Only     increased urinary frequency x5d     Randi Bello is a 80 y.o. female here today to follow up on:    Urinary frequency  Starting about 5 days.  She was waking up several times during the night and urinating small amounts.  She did initially have some dysuria but that seemed to get a bit better with time.  Her daughter gave her 2 doses of an unknown antibiotic which she took Saturday, she felt that it helped somewhat but symptoms are not resolved.  No fever, chills, hematuria, back pain.  She was taking Azo up to 2 days ago    Seborrheic keratosis  Raised rough lesion on the right mid back it is constantly rubbed by her bra and becomes irritated    Current medicines (including changes today)  Current Outpatient Medications   Medication Sig Dispense Refill   • nitrofurantoin (MACROBID) 100 MG Cap Take 1 capsule by mouth 2 times a day. 10 capsule 0   • diclofenac sodium 1 % Gel Apply 4 g topically every 8 hours as needed. 100 g 0   • fluticasone (FLOVENT HFA) 220 MCG/ACT Aerosol Inhale 1 Puff 2 times a day. 12 g 5   • omeprazole (PRILOSEC) 40 MG delayed-release capsule TAKE 1 CAPSULE BY MOUTH EVERY DAY 90 capsule 0   • atorvastatin (LIPITOR) 20 MG Tab Take 1 Tab by mouth every day. TAKE 1 TAB BY MOUTH EVERY DAY. 100 Tab 3   • Ferrous Sulfate (IRON PO) Take 1 Tab by mouth every day.     • Cholecalciferol (D3 VITAMIN PO) Take 1 Cap by mouth every day.     • hydroCHLOROthiazide (HYDRODIURIL) 25 MG Tab Take 1 Tab by mouth every day. 90 Tab 1   • clopidogrel (PLAVIX) 75 MG Tab Take 1 Tab by mouth every day. 90 Tab 3   • albuterol 108 (90 Base) MCG/ACT Aero Soln inhalation aerosol Inhale 2 Puffs by mouth every 6 hours as needed for Shortness of Breath. 8.5 g 3   • aspirin (ASA) 325 MG Tab Take 325 mg by mouth every day. Pt takes one tablet daily but took and extra ASPIRIN 325MG today     • amoxicillin-clavulanate (AUGMENTIN) 875-125 MG Tab Take  "1 tablet by mouth 2 times a day. (Patient not taking: Reported on 3/20/2021) 20 tablet 0     No current facility-administered medications for this visit.     She  has a past medical history of Anesthesia, Anxiety (6/9/2016), Bell palsy (5/14/2015), Chronic fatigue (1/15/2016), CKD (chronic kidney disease) stage 3, GFR 30-59 ml/min (3/3/2018), COPD (chronic obstructive pulmonary disease) ([facility]), Dental disorder, Dyslipidemia (1/4/2017), Environmental allergies (7/5/2017), GERD (gastroesophageal reflux disease), Hair thinning (1/15/2016), Hiatus hernia syndrome, History of esophageal cancer, Hypertension (4/1/2015), Myocardial infarct ([facility]) (1996), Pneumonia (2007), Post-menopausal osteoporosis (3/3/2018), Primary osteoarthritis involving multiple joints (12/12/2017), Raynaud's disease (1/15/2016), and Shingles (2014).    ROS included above     Objective:     /70 (BP Location: Right arm, Patient Position: Sitting, BP Cuff Size: Adult)   Pulse 62   Temp 36.2 °C (97.2 °F) (Temporal)   Resp 20   Ht 1.651 m (5' 5\")   Wt 75 kg (165 lb 5.5 oz)   SpO2 96%  Body mass index is 27.51 kg/m².     Physical Exam:  General: Alert, oriented in no acute distress.  Eye contact is good, speech is normal, affect calm  Lungs: clear to auscultation bilaterally, normal effort, no wheeze/ rhonchi/ rales.  CV: regular rate and rhythm, S1, S2, no murmur  Abdomen: soft, nontender, No CVAT  Ext: Approximately 2 cm raised rough hyperpigmented lesion in the right mid back, treated today with cryotherapy.  No edema, color normal, vascularity normal, temperature normal    Assessment and Plan:   The following treatment plan was discussed  1. Urinary frequency   dysuria and urinary frequency starting about 5 days ago.  Unfortunately, she self treated with 2 doses of antibiotics as well as Azo.  Advised against medication sharing.  We will send for culture and go ahead and start her on Macrobid, follow-up pending result  URINE CULTURE(NEW) "    nitrofurantoin (MACROBID) 100 MG Cap    POCT Urinalysis   2. Seborrheic keratosis   treated today with cryotherapy, well-tolerated       Followup: As needed         Please note that this dictation was created using voice recognition software. I have worked with consultants from the vendor as well as technical experts from [facility] to optimize the interface. I have made every reasonable attempt to correct obvious errors, but I expect that there are errors of grammar and possibly content that I did not discover before finalizing the note.

## 2021-04-30 LAB
AMBIGUOUS DTTM AMBI4: NORMAL
SIGNIFICANT IND 70042: NORMAL
SITE SITE: NORMAL
SOURCE SOURCE: NORMAL

## 2021-05-03 LAB
BACTERIA UR CULT: ABNORMAL
SIGNIFICANT IND 70042: ABNORMAL
SITE SITE: ABNORMAL
SOURCE SOURCE: ABNORMAL

## 2021-05-07 DIAGNOSIS — I10 ESSENTIAL HYPERTENSION: ICD-10-CM

## 2021-05-07 RX ORDER — HYDROCHLOROTHIAZIDE 25 MG/1
TABLET ORAL
Qty: 90 TABLET | Refills: 1 | Status: SHIPPED | OUTPATIENT
Start: 2021-05-07 | End: 2021-11-02

## 2021-05-07 NOTE — TELEPHONE ENCOUNTER
Received request via: Pharmacy    Was the patient seen in the last year in this department? Yes    Does the patient have an active prescription (recently filled or refills available) for medication(s) requested? No    LOV: 04/29/2021

## 2021-05-08 ENCOUNTER — PATIENT MESSAGE (OUTPATIENT)
Dept: MEDICAL GROUP | Facility: MEDICAL CENTER | Age: 81
End: 2021-05-08

## 2021-05-08 DIAGNOSIS — G89.29 CHRONIC MIDLINE THORACIC BACK PAIN: ICD-10-CM

## 2021-05-08 DIAGNOSIS — M54.6 CHRONIC MIDLINE THORACIC BACK PAIN: ICD-10-CM

## 2021-05-10 RX ORDER — HYDROCODONE BITARTRATE AND ACETAMINOPHEN 5; 325 MG/1; MG/1
1 TABLET ORAL 2 TIMES DAILY PRN
Qty: 60 TABLET | Refills: 0 | Status: SHIPPED | OUTPATIENT
Start: 2021-05-10 | End: 2021-06-09

## 2021-05-10 NOTE — PATIENT COMMUNICATION
Received request via: Patient    Was the patient seen in the last year in this department? Yes    Does the patient have an active prescription (recently filled or refills available) for medication(s) requested? No     LOV: 03/09/2021    Pt requesting Percocet, hasn't been prescribed by you.

## 2021-05-10 NOTE — TELEPHONE ENCOUNTER
"From: Randi Bello  To: Nurse Practitioner Mansi Thakur  Sent: 5/8/2021 4:04 PM PDT  Subject: Non-Urgent Medical Question    Orion Nazario, Am requesting a refill for \"Hydrocodone acetamin\" . When I had the bruised rib and torn muscle, I had to use two pills as prescribed for the pain, and still need for my back pain.. Would really appreciate it.  Thanks so much. Randi Bello  "

## 2021-06-10 ENCOUNTER — PATIENT MESSAGE (OUTPATIENT)
Dept: MEDICAL GROUP | Facility: MEDICAL CENTER | Age: 81
End: 2021-06-10

## 2021-06-10 DIAGNOSIS — J32.9 RECURRENT SINUSITIS: ICD-10-CM

## 2021-06-10 DIAGNOSIS — J34.89 SINUS PRESSURE: ICD-10-CM

## 2021-06-11 NOTE — TELEPHONE ENCOUNTER
From: Randi Bello  To: Nurse Practitioner Mansi Thakur  Sent: 6/10/2021 3:19 PM PDT  Subject: Non-Urgent Medical Question    Orion Nazario, I think I would prefer a CT first. I have been taking Advil cold and sinus liquid gels.  This sounds yucky, but I wake up at night choking on my phlegm and have to get up and expectorate.  Anything would help at this time. I have had other issues with my eyes, which seems a mystery to my Ophthalmologist as well. Can't help but think I'm having a few after affects after Covid. Either that, or my body is just freaking falling apart. lol  Thanks Mansi      ----- Message -----   From:Nurse Practitioner Mansi Thakur   Sent:6/10/2021 10:59 AM PDT   To:Randi Bello   Subject:RE: Non-Urgent Medical Question    Orion Mendoza,  Sorry to hear that you are not feeling well. I am concerned, however, about the frequency at which you are needing antibiotics or feeling that you are having infections. You just had an antibiotic for sinus issues 3 months ago. Its not normal to have repeated courses in a year. I think that we need to send you to an ENT or obtain some imaging of your sinuses. I can order a CT, or just start with the referral. What would you like to do?  What are you using to manage her sinus pressure and congestion at this time?  Monique ARRIETA      ----- Message -----   From:Randi Bello   Sent:6/10/2021 8:35 AM PDT   To:Nurse Practitioner Mansi Thakur   Subject:Non-Urgent Medical Question    Orion Nazario sorry to bother you, I am experiencing the onset of a sinus infection again. It has now gone into area above and below my eyes with pressure and pain. Am hoping I can get started on Augmentin to block this from getting really bad again.   Hope you can help, Thanks so much Kelly

## 2021-06-21 ENCOUNTER — PATIENT MESSAGE (OUTPATIENT)
Dept: MEDICAL GROUP | Facility: MEDICAL CENTER | Age: 81
End: 2021-06-21

## 2021-06-21 DIAGNOSIS — I25.2 HISTORY OF MI (MYOCARDIAL INFARCTION): ICD-10-CM

## 2021-06-22 RX ORDER — CLOPIDOGREL BISULFATE 75 MG/1
75 TABLET ORAL
Qty: 90 TABLET | Refills: 3 | Status: SHIPPED | OUTPATIENT
Start: 2021-06-22 | End: 2022-05-04 | Stop reason: SDUPTHER

## 2021-06-22 NOTE — TELEPHONE ENCOUNTER
From: Randi Bello  To: Nurse Practitioner Mansi Thakur  Sent: 6/21/2021 4:37 PM PDT  Subject: Non-Urgent Medical Question    Orion Nazario, My pharmacy keeps texting me regarding my prescription Clopidigrel (plavix) They need your approval to refill.   Thank you , Kelly

## 2021-07-09 ENCOUNTER — HOSPITAL ENCOUNTER (OUTPATIENT)
Dept: RADIOLOGY | Facility: MEDICAL CENTER | Age: 81
End: 2021-07-09
Attending: NURSE PRACTITIONER
Payer: MEDICARE

## 2021-07-09 DIAGNOSIS — J34.89 SINUS PRESSURE: ICD-10-CM

## 2021-07-09 DIAGNOSIS — J32.9 RECURRENT SINUSITIS: ICD-10-CM

## 2021-07-09 PROCEDURE — 70486 CT MAXILLOFACIAL W/O DYE: CPT | Mod: MG

## 2021-07-12 ENCOUNTER — PATIENT MESSAGE (OUTPATIENT)
Dept: MEDICAL GROUP | Facility: MEDICAL CENTER | Age: 81
End: 2021-07-12

## 2021-07-12 DIAGNOSIS — G89.29 CHRONIC MIDLINE THORACIC BACK PAIN: ICD-10-CM

## 2021-07-12 DIAGNOSIS — M54.6 CHRONIC MIDLINE THORACIC BACK PAIN: ICD-10-CM

## 2021-07-13 ENCOUNTER — TELEPHONE (OUTPATIENT)
Dept: MEDICAL GROUP | Facility: MEDICAL CENTER | Age: 81
End: 2021-07-13

## 2021-07-13 ENCOUNTER — PATIENT MESSAGE (OUTPATIENT)
Dept: MEDICAL GROUP | Facility: MEDICAL CENTER | Age: 81
End: 2021-07-13

## 2021-07-13 RX ORDER — AMOXICILLIN AND CLAVULANATE POTASSIUM 875; 125 MG/1; MG/1
1 TABLET, FILM COATED ORAL 2 TIMES DAILY
Qty: 20 TABLET | Refills: 0 | Status: SHIPPED | OUTPATIENT
Start: 2021-07-13 | End: 2021-08-12

## 2021-07-13 RX ORDER — HYDROCODONE BITARTRATE AND ACETAMINOPHEN 5; 325 MG/1; MG/1
1 TABLET ORAL 2 TIMES DAILY PRN
Qty: 60 TABLET | Refills: 0 | Status: SHIPPED | OUTPATIENT
Start: 2021-07-13 | End: 2021-08-12

## 2021-07-13 NOTE — TELEPHONE ENCOUNTER
From: aRndi Bello  To: Nurse Practitioner Mansi Thakur  Sent: 7/13/2021 1:56 PM PDT  Subject: Test Result Question    Orion Nazario, yes I have had three appointments with an Ophthalmologist and per their recommendation had laser treatment to remove cloudy particles from my right eye, (the one giving me problems). Also, I have not been able to get rid of the sinus pressure and constant drainage. making me wonder if maybe a three week treatment of Augmentin would finally clear everything up. The last prescription helped but it seemed to not clear it all up.  Sorry to be a bother, but I really could use more help.  Thanks so much Kelly      ----- Message -----   From:Nurse Practitioner Mansi Thakur   Sent:7/13/2021 1:30 PM PDT   To:Randi Bello   Subject:RE: Test Result Question    When was your last eye exam appointment? Have you been seen since these symptoms started?      ----- Message -----   From:Randi Bello   Sent:7/12/2021 11:45 AM PDT   To:Nurse Practitioner Mansi Thakur   Subject:Test Result Question    Orion Nazairo, I have no clue what the test results mean. Answering your ?, I don't seem to be getting any relief from any over the counter meds. I am experiencing dizziness and pressure headaches around my eyes. The dizziness seems to cause my eyesight to have a cross-eyed effect. Nothing seems to be helping, but I do have a theory. 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 [facility] don't seem to have an answer to this problem. I am getting very discouraged.  Good luck , LOL  Again, thanks

## 2021-07-21 ENCOUNTER — PATIENT MESSAGE (OUTPATIENT)
Dept: MEDICAL GROUP | Facility: MEDICAL CENTER | Age: 81
End: 2021-07-21

## 2021-07-21 DIAGNOSIS — R09.81 NASAL CONGESTION: ICD-10-CM

## 2021-07-21 DIAGNOSIS — J43.1 PANLOBULAR EMPHYSEMA (HCC): ICD-10-CM

## 2021-07-21 RX ORDER — FLUTICASONE PROPIONATE 50 MCG
1 SPRAY, SUSPENSION (ML) NASAL DAILY
Qty: 16 G | Refills: 1 | Status: SHIPPED | OUTPATIENT
Start: 2021-07-21 | End: 2021-08-17

## 2021-07-21 RX ORDER — FLUTICASONE PROPIONATE 220 UG/1
1 AEROSOL, METERED RESPIRATORY (INHALATION) 2 TIMES DAILY
Qty: 12 G | Refills: 5 | Status: CANCELLED | OUTPATIENT
Start: 2021-07-21

## 2021-08-11 ENCOUNTER — PATIENT MESSAGE (OUTPATIENT)
Dept: HEALTH INFORMATION MANAGEMENT | Facility: OTHER | Age: 81
End: 2021-08-11

## 2021-08-12 ENCOUNTER — OFFICE VISIT (OUTPATIENT)
Dept: MEDICAL GROUP | Facility: MEDICAL CENTER | Age: 81
End: 2021-08-12
Payer: MEDICARE

## 2021-08-12 ENCOUNTER — PATIENT MESSAGE (OUTPATIENT)
Dept: MEDICAL GROUP | Facility: MEDICAL CENTER | Age: 81
End: 2021-08-12

## 2021-08-12 VITALS
RESPIRATION RATE: 16 BRPM | DIASTOLIC BLOOD PRESSURE: 60 MMHG | BODY MASS INDEX: 27.97 KG/M2 | OXYGEN SATURATION: 95 % | SYSTOLIC BLOOD PRESSURE: 142 MMHG | TEMPERATURE: 97.2 F | HEART RATE: 56 BPM | WEIGHT: 167.88 LBS | HEIGHT: 65 IN

## 2021-08-12 DIAGNOSIS — Z23 NEED FOR VACCINATION: ICD-10-CM

## 2021-08-12 DIAGNOSIS — I65.29 STENOSIS OF CAROTID ARTERY, UNSPECIFIED LATERALITY: ICD-10-CM

## 2021-08-12 DIAGNOSIS — J32.2 CHRONIC ETHMOIDAL SINUSITIS: ICD-10-CM

## 2021-08-12 DIAGNOSIS — H53.9 VISION CHANGES: ICD-10-CM

## 2021-08-12 PROCEDURE — 99213 OFFICE O/P EST LOW 20 MIN: CPT | Performed by: NURSE PRACTITIONER

## 2021-08-12 ASSESSMENT — FIBROSIS 4 INDEX: FIB4 SCORE: 1.55

## 2021-08-12 NOTE — ASSESSMENT & PLAN NOTE
Feels that her sinus pressure and congestion have improved after course of antibiotics.  She is still having some allergy issues but overall doing better

## 2021-08-12 NOTE — PROGRESS NOTES
Subjective:     Chief Complaint   Patient presents with   • Follow-Up     Randi Bello is a 81 y.o. female stylish patient here with her daughter to discuss concerns with vision changes.  Over the last several months she has been having brief episodes of spots in her vision or blinking lights.  Seems to come on unexpectedly, not necessarily associated with activity.  Tends to last about a minute and then self resolves.  She does not have any associated headache but sometimes does feel dizzy.  She has been seen by her eye doctor, she does have cataracts and is being monitored for this.  Otherwise no explanation was noted.  She has no history of migraine.  No presyncopal sensation.  No associated confusion, nausea, numbness, tingling, or weakness in extremities    She does have history of carotid artery stenosis with stent placement on the right.  At that time her left carotid artery was less than 50% narrowed.     Chronic sinusitis, unspecified  Feels that her sinus pressure and congestion have improved after course of antibiotics.  She is still having some allergy issues but overall doing better       Current medicines (including changes today)  Current Outpatient Medications   Medication Sig Dispense Refill   • fluticasone (FLONASE) 50 MCG/ACT nasal spray Administer 1 Spray into affected nostril(S) every day. 16 g 1   • HYDROcodone-acetaminophen (NORCO) 5-325 MG Tab per tablet Take 1 tablet by mouth 2 times a day as needed for up to 30 days. 60 tablet 0   • clopidogrel (PLAVIX) 75 MG Tab Take 1 tablet by mouth every day. 90 tablet 3   • omeprazole (PRILOSEC) 40 MG delayed-release capsule TAKE 1 CAPSULE BY MOUTH EVERY DAY 90 capsule 1   • hydroCHLOROthiazide (HYDRODIURIL) 25 MG Tab TAKE 1 TABLET BY MOUTH EVERY DAY 90 tablet 1   • fluticasone (FLOVENT HFA) 220 MCG/ACT Aerosol Inhale 1 Puff 2 times a day. 12 g 5   • atorvastatin (LIPITOR) 20 MG Tab Take 1 Tab by mouth every day. TAKE 1 TAB BY MOUTH EVERY DAY.  "100 Tab 3   • Ferrous Sulfate (IRON PO) Take 1 Tab by mouth every day.     • Cholecalciferol (D3 VITAMIN PO) Take 1 Cap by mouth every day.     • albuterol 108 (90 Base) MCG/ACT Aero Soln inhalation aerosol Inhale 2 Puffs by mouth every 6 hours as needed for Shortness of Breath. 8.5 g 3   • aspirin (ASA) 325 MG Tab Take 325 mg by mouth every day. Pt takes one tablet daily but took and extra ASPIRIN 325MG today     • amoxicillin-clavulanate (AUGMENTIN) 875-125 MG Tab Take 1 tablet by mouth 2 times a day. (Patient not taking: Reported on 8/12/2021) 20 tablet 0   • nitrofurantoin (MACROBID) 100 MG Cap Take 1 capsule by mouth 2 times a day. (Patient not taking: Reported on 8/12/2021) 10 capsule 0   • diclofenac sodium 1 % Gel Apply 4 g topically every 8 hours as needed. (Patient not taking: Reported on 8/12/2021) 100 g 0   • amoxicillin-clavulanate (AUGMENTIN) 875-125 MG Tab Take 1 tablet by mouth 2 times a day. (Patient not taking: Reported on 3/20/2021) 20 tablet 0     No current facility-administered medications for this visit.     She  has a past medical history of Anesthesia, Anxiety (6/9/2016), Bell palsy (5/14/2015), Chronic fatigue (1/15/2016), CKD (chronic kidney disease) stage 3, GFR 30-59 ml/min ([facility]) (3/3/2018), COPD (chronic obstructive pulmonary disease) ([facility]), Dental disorder, Dyslipidemia (1/4/2017), Environmental allergies (7/5/2017), GERD (gastroesophageal reflux disease), Hair thinning (1/15/2016), Hiatus hernia syndrome, History of esophageal cancer, Hypertension (4/1/2015), Myocardial infarct ([facility]) (1996), Pneumonia (2007), Post-menopausal osteoporosis (3/3/2018), Primary osteoarthritis involving multiple joints (12/12/2017), Raynaud's disease (1/15/2016), and Shingles (2014).    ROS included above     Objective:     /60   Pulse (!) 56   Temp 36.2 °C (97.2 °F) (Temporal)   Resp 16   Ht 1.651 m (5' 5\")   Wt 76.1 kg (167 lb 14.1 oz)   SpO2 95%  Body mass index is 27.94 kg/m².     Physical " Exam:  General: Alert, oriented in no acute distress.  Eye contact is good, speech is normal, affect calm  HEENT: EOMI, PERRL.TMs gray with good landmarks bilaterally. No lymphadenopathy.  Lungs: clear to auscultation bilaterally, normal effort, no wheeze/ rhonchi/ rales.  CV: regular rate and rhythm, S1, S2, no murmur  Ext: no edema, color normal, vascularity normal, temperature normal    Assessment and Plan:   The following treatment plan was discussed   1. Vision changes   flashes and spots in her vision lasting about a minute at a time.  She has seen her ophthalmologist and retinal specialist without an explanation.  This could be ocular migraine.  She does not have any associated headache, no history of migraine.  She denies any associated confusion, presyncopal sensation, numbness, tingling, or weakness in extremities.  We will evaluate CTA of the head and neck again, follow-up pending result  CT-CTA HEAD AND NECK W W/O POST PROCESS   2. Stenosis of carotid artery, unspecified laterality  CT-CTA HEAD AND NECK W W/O POST PROCESS   3. Chronic ethmoidal sinusitis   stable at this time       Followup: No follow-ups on file.         Please note that this dictation was created using voice recognition software. I have worked with consultants from the vendor as well as technical experts from [employer] to optimize the interface. I have made every reasonable attempt to correct obvious errors, but I expect that there are errors of grammar and possibly content that I did not discover before finalizing the note.

## 2021-08-13 ENCOUNTER — PATIENT MESSAGE (OUTPATIENT)
Dept: MEDICAL GROUP | Facility: MEDICAL CENTER | Age: 81
End: 2021-08-13

## 2021-08-13 DIAGNOSIS — N18.31 STAGE 3A CHRONIC KIDNEY DISEASE: ICD-10-CM

## 2021-08-16 ENCOUNTER — PATIENT MESSAGE (OUTPATIENT)
Dept: MEDICAL GROUP | Facility: MEDICAL CENTER | Age: 81
End: 2021-08-16

## 2021-08-17 DIAGNOSIS — R09.81 NASAL CONGESTION: ICD-10-CM

## 2021-08-17 RX ORDER — FLUTICASONE PROPIONATE 50 MCG
1 SPRAY, SUSPENSION (ML) NASAL DAILY
Qty: 16 ML | Refills: 1 | Status: SHIPPED | OUTPATIENT
Start: 2021-08-17 | End: 2021-08-31

## 2021-08-31 ENCOUNTER — HOSPITAL ENCOUNTER (OUTPATIENT)
Dept: LAB | Facility: MEDICAL CENTER | Age: 81
End: 2021-08-31
Attending: NURSE PRACTITIONER
Payer: MEDICARE

## 2021-08-31 DIAGNOSIS — R09.81 NASAL CONGESTION: ICD-10-CM

## 2021-08-31 DIAGNOSIS — N18.31 STAGE 3A CHRONIC KIDNEY DISEASE: ICD-10-CM

## 2021-08-31 LAB
ALBUMIN SERPL BCP-MCNC: 4 G/DL (ref 3.2–4.9)
ALBUMIN/GLOB SERPL: 1.3 G/DL
ALP SERPL-CCNC: 88 U/L (ref 30–99)
ALT SERPL-CCNC: 14 U/L (ref 2–50)
ANION GAP SERPL CALC-SCNC: 9 MMOL/L (ref 7–16)
AST SERPL-CCNC: 16 U/L (ref 12–45)
BILIRUB SERPL-MCNC: 0.4 MG/DL (ref 0.1–1.5)
BUN SERPL-MCNC: 21 MG/DL (ref 8–22)
CALCIUM SERPL-MCNC: 9.5 MG/DL (ref 8.5–10.5)
CHLORIDE SERPL-SCNC: 103 MMOL/L (ref 96–112)
CO2 SERPL-SCNC: 28 MMOL/L (ref 20–33)
CREAT SERPL-MCNC: 1.15 MG/DL (ref 0.5–1.4)
GLOBULIN SER CALC-MCNC: 3.2 G/DL (ref 1.9–3.5)
GLUCOSE SERPL-MCNC: 98 MG/DL (ref 65–99)
POTASSIUM SERPL-SCNC: 3.6 MMOL/L (ref 3.6–5.5)
PROT SERPL-MCNC: 7.2 G/DL (ref 6–8.2)
SODIUM SERPL-SCNC: 140 MMOL/L (ref 135–145)

## 2021-08-31 PROCEDURE — 80053 COMPREHEN METABOLIC PANEL: CPT

## 2021-08-31 PROCEDURE — 36415 COLL VENOUS BLD VENIPUNCTURE: CPT

## 2021-08-31 RX ORDER — FLUTICASONE PROPIONATE 50 MCG
1 SPRAY, SUSPENSION (ML) NASAL DAILY
Qty: 48 ML | Refills: 1 | Status: SHIPPED | OUTPATIENT
Start: 2021-08-31

## 2021-08-31 NOTE — TELEPHONE ENCOUNTER
Received request via: Pharmacy    Was the patient seen in the last year in this department? Yes    Does the patient have an active prescription (recently filled or refills available) for medication(s) requested? Yes. Refilled 8/17/21    Requested Prescriptions     Pending Prescriptions Disp Refills   • fluticasone (FLONASE) 50 MCG/ACT nasal spray [Pharmacy Med Name: FLUTICASONE PROP 50 MCG SPRAY] 48 mL 1     Sig: ADMINISTER 1 SPRAY INTO AFFECTED NOSTRIL(S) EVERY DAY.

## 2021-09-02 ENCOUNTER — HOSPITAL ENCOUNTER (OUTPATIENT)
Dept: RADIOLOGY | Facility: MEDICAL CENTER | Age: 81
End: 2021-09-02
Attending: NURSE PRACTITIONER
Payer: MEDICARE

## 2021-09-02 ENCOUNTER — PATIENT MESSAGE (OUTPATIENT)
Dept: MEDICAL GROUP | Facility: MEDICAL CENTER | Age: 81
End: 2021-09-02

## 2021-09-02 DIAGNOSIS — I65.29 STENOSIS OF CAROTID ARTERY, UNSPECIFIED LATERALITY: ICD-10-CM

## 2021-09-02 DIAGNOSIS — H53.9 VISION CHANGES: ICD-10-CM

## 2021-09-02 PROCEDURE — 700117 HCHG RX CONTRAST REV CODE 255: Performed by: NURSE PRACTITIONER

## 2021-09-02 PROCEDURE — 70498 CT ANGIOGRAPHY NECK: CPT | Mod: MH

## 2021-09-02 PROCEDURE — 70496 CT ANGIOGRAPHY HEAD: CPT | Mod: ME

## 2021-09-02 RX ADMIN — IOHEXOL 80 ML: 350 INJECTION, SOLUTION INTRAVENOUS at 08:28

## 2021-09-06 ENCOUNTER — PATIENT MESSAGE (OUTPATIENT)
Dept: MEDICAL GROUP | Facility: MEDICAL CENTER | Age: 81
End: 2021-09-06

## 2021-09-13 ENCOUNTER — PATIENT MESSAGE (OUTPATIENT)
Dept: MEDICAL GROUP | Facility: MEDICAL CENTER | Age: 81
End: 2021-09-13

## 2021-09-13 DIAGNOSIS — M54.6 CHRONIC MIDLINE THORACIC BACK PAIN: ICD-10-CM

## 2021-09-13 DIAGNOSIS — G89.29 CHRONIC MIDLINE THORACIC BACK PAIN: ICD-10-CM

## 2021-09-14 RX ORDER — HYDROCODONE BITARTRATE AND ACETAMINOPHEN 5; 325 MG/1; MG/1
1 TABLET ORAL EVERY 12 HOURS PRN
Qty: 60 TABLET | Refills: 0 | Status: SHIPPED | OUTPATIENT
Start: 2021-09-14 | End: 2021-10-14

## 2021-10-20 ENCOUNTER — HOSPITAL ENCOUNTER (OUTPATIENT)
Facility: MEDICAL CENTER | Age: 81
End: 2021-10-20
Attending: NURSE PRACTITIONER
Payer: MEDICARE

## 2021-10-20 ENCOUNTER — PATIENT MESSAGE (OUTPATIENT)
Dept: MEDICAL GROUP | Facility: MEDICAL CENTER | Age: 81
End: 2021-10-20

## 2021-10-20 ENCOUNTER — OFFICE VISIT (OUTPATIENT)
Dept: MEDICAL GROUP | Facility: MEDICAL CENTER | Age: 81
End: 2021-10-20
Payer: MEDICARE

## 2021-10-20 VITALS
WEIGHT: 170.64 LBS | SYSTOLIC BLOOD PRESSURE: 130 MMHG | OXYGEN SATURATION: 93 % | DIASTOLIC BLOOD PRESSURE: 60 MMHG | RESPIRATION RATE: 18 BRPM | TEMPERATURE: 96.9 F | HEART RATE: 78 BPM | BODY MASS INDEX: 28.43 KG/M2 | HEIGHT: 65 IN

## 2021-10-20 DIAGNOSIS — M15.9 PRIMARY OSTEOARTHRITIS INVOLVING MULTIPLE JOINTS: ICD-10-CM

## 2021-10-20 DIAGNOSIS — M54.6 CHRONIC MIDLINE THORACIC BACK PAIN: ICD-10-CM

## 2021-10-20 DIAGNOSIS — G89.29 CHRONIC MIDLINE THORACIC BACK PAIN: ICD-10-CM

## 2021-10-20 DIAGNOSIS — R35.0 URINARY FREQUENCY: ICD-10-CM

## 2021-10-20 PROBLEM — U07.1 PNEUMONIA DUE TO COVID-19 VIRUS: Status: RESOLVED | Noted: 2021-01-08 | Resolved: 2021-10-20

## 2021-10-20 PROBLEM — J12.82 PNEUMONIA DUE TO COVID-19 VIRUS: Status: RESOLVED | Noted: 2021-01-08 | Resolved: 2021-10-20

## 2021-10-20 LAB
APPEARANCE UR: CLEAR
BILIRUB UR STRIP-MCNC: NORMAL MG/DL
COLOR UR AUTO: YELLOW
GLUCOSE UR STRIP.AUTO-MCNC: NEGATIVE MG/DL
KETONES UR STRIP.AUTO-MCNC: NEGATIVE MG/DL
LEUKOCYTE ESTERASE UR QL STRIP.AUTO: NEGATIVE
NITRITE UR QL STRIP.AUTO: NEGATIVE
PH UR STRIP.AUTO: 6 [PH] (ref 5–8)
PROT UR QL STRIP: NEGATIVE MG/DL
RBC UR QL AUTO: NORMAL
SP GR UR STRIP.AUTO: 1.02
UROBILINOGEN UR STRIP-MCNC: 0.2 MG/DL

## 2021-10-20 PROCEDURE — 87186 SC STD MICRODIL/AGAR DIL: CPT

## 2021-10-20 PROCEDURE — 87086 URINE CULTURE/COLONY COUNT: CPT

## 2021-10-20 PROCEDURE — 81002 URINALYSIS NONAUTO W/O SCOPE: CPT | Performed by: NURSE PRACTITIONER

## 2021-10-20 PROCEDURE — 87077 CULTURE AEROBIC IDENTIFY: CPT

## 2021-10-20 PROCEDURE — 99213 OFFICE O/P EST LOW 20 MIN: CPT | Performed by: NURSE PRACTITIONER

## 2021-10-20 RX ORDER — HYDROCODONE BITARTRATE AND ACETAMINOPHEN 5; 325 MG/1; MG/1
1 TABLET ORAL EVERY 12 HOURS PRN
Qty: 60 TABLET | Refills: 0 | Status: SHIPPED | OUTPATIENT
Start: 2021-12-19 | End: 2022-01-18

## 2021-10-20 RX ORDER — HYDROCODONE BITARTRATE AND ACETAMINOPHEN 5; 325 MG/1; MG/1
1 TABLET ORAL EVERY 12 HOURS PRN
Qty: 60 TABLET | Refills: 0 | Status: SHIPPED | OUTPATIENT
Start: 2021-11-19 | End: 2021-12-19

## 2021-10-20 RX ORDER — HYDROCODONE BITARTRATE AND ACETAMINOPHEN 5; 325 MG/1; MG/1
1 TABLET ORAL EVERY 12 HOURS PRN
Qty: 60 TABLET | Refills: 0 | Status: SHIPPED | OUTPATIENT
Start: 2021-10-20 | End: 2021-11-19

## 2021-10-20 ASSESSMENT — FIBROSIS 4 INDEX: FIB4 SCORE: 1.17

## 2021-10-20 NOTE — ASSESSMENT & PLAN NOTE
Intermittent difficulty with frequency and urgency, sometimes incontinence.  Seems to come and go, recently had a flare and started self treating with Azo which did seem to help.  Today she is not experiencing any symptoms.  Last urine test in clinic back in May did show E. coli growth.  She denies hematuria, fever, chills, back pain

## 2021-10-20 NOTE — PROGRESS NOTES
Subjective:     Chief Complaint   Patient presents with   • Bladder Problem     Randi Bello is a 81 y.o. female here today to follow up on:    Urinary frequency  Intermittent difficulty with frequency and urgency, sometimes incontinence.  Seems to come and go, recently had a flare and started self treating with Azo which did seem to help.  Today she is not experiencing any symptoms.  Last urine test in clinic back in May did show E. coli growth.  She denies hematuria, fever, chills, back pain    Primary osteoarthritis involving multiple joints  Chronic issue well-controlled at this time with over-the-counter Tylenol and as needed use of Norco, up to twice daily on days that she is particularly active.  She is getting good pain relief with use of medication, denies any concerning side effects including constipation, sedation.  She is unable to take NSAIDs due to chronic kidney disease.  Opiate risk score 0.   report reviewed, no aberrant behavior.  We will update UDS and controlled substance agreement today       Current medicines (including changes today)  Current Outpatient Medications   Medication Sig Dispense Refill   • HYDROcodone-acetaminophen (NORCO) 5-325 MG Tab per tablet Take 1 Tablet by mouth every 12 hours as needed for up to 30 days. 60 Tablet 0   • [START ON 11/19/2021] HYDROcodone-acetaminophen (NORCO) 5-325 MG Tab per tablet Take 1 Tablet by mouth every 12 hours as needed for up to 30 days. 60 Tablet 0   • [START ON 12/19/2021] HYDROcodone-acetaminophen (NORCO) 5-325 MG Tab per tablet Take 1 Tablet by mouth every 12 hours as needed for up to 30 days. 60 Tablet 0   • fluticasone (FLONASE) 50 MCG/ACT nasal spray ADMINISTER 1 SPRAY INTO AFFECTED NOSTRIL(S) EVERY DAY. 48 mL 1   • clopidogrel (PLAVIX) 75 MG Tab Take 1 tablet by mouth every day. 90 tablet 3   • omeprazole (PRILOSEC) 40 MG delayed-release capsule TAKE 1 CAPSULE BY MOUTH EVERY DAY 90 capsule 1   • hydroCHLOROthiazide (HYDRODIURIL)  "25 MG Tab TAKE 1 TABLET BY MOUTH EVERY DAY 90 tablet 1   • diclofenac sodium 1 % Gel Apply 4 g topically every 8 hours as needed. (Patient not taking: Reported on 8/12/2021) 100 g 0   • fluticasone (FLOVENT HFA) 220 MCG/ACT Aerosol Inhale 1 Puff 2 times a day. 12 g 5   • atorvastatin (LIPITOR) 20 MG Tab Take 1 Tab by mouth every day. TAKE 1 TAB BY MOUTH EVERY DAY. 100 Tab 3   • Ferrous Sulfate (IRON PO) Take 1 Tab by mouth every day.     • Cholecalciferol (D3 VITAMIN PO) Take 1 Cap by mouth every day.     • albuterol 108 (90 Base) MCG/ACT Aero Soln inhalation aerosol Inhale 2 Puffs by mouth every 6 hours as needed for Shortness of Breath. 8.5 g 3   • aspirin (ASA) 325 MG Tab Take 325 mg by mouth every day. Pt takes one tablet daily but took and extra ASPIRIN 325MG today       No current facility-administered medications for this visit.     She  has a past medical history of Anesthesia, Anxiety (6/9/2016), Bell palsy (5/14/2015), Chronic fatigue (1/15/2016), CKD (chronic kidney disease) stage 3, GFR 30-59 ml/min ([facility]) (3/3/2018), COPD (chronic obstructive pulmonary disease) ([facility]), Dental disorder, Dyslipidemia (1/4/2017), Environmental allergies (7/5/2017), GERD (gastroesophageal reflux disease), Hair thinning (1/15/2016), Hiatus hernia syndrome, History of esophageal cancer, Hypertension (4/1/2015), Myocardial infarct ([facility]) (1996), Pneumonia (2007), Post-menopausal osteoporosis (3/3/2018), Primary osteoarthritis involving multiple joints (12/12/2017), Raynaud's disease (1/15/2016), and Shingles (2014).    ROS included above     Objective:     /60 (BP Location: Right arm, Patient Position: Sitting, BP Cuff Size: Adult)   Pulse 78   Temp 36.1 °C (96.9 °F) (Temporal)   Resp 18   Ht 1.651 m (5' 5\")   Wt 77.4 kg (170 lb 10.2 oz)   SpO2 93%  Body mass index is 28.4 kg/m².     Physical Exam:  General: Alert, oriented in no acute distress.  Eye contact is good, speech is normal, affect calm  Lungs: clear to " auscultation bilaterally, normal effort, no wheeze/ rhonchi/ rales.  CV: regular rate and rhythm, S1, S2, no murmur  Abdomen: soft, nontender, No CVAT  Ext: no edema, color normal, vascularity normal, temperature normal    Assessment and Plan:   The following treatment plan was discussed  1. Urinary frequency   intermittent difficulty with frequency and urgency, recent flare which has resolved at this time.  She did have a urine culture in May that was positive for E. coli.  UA in the office today is normal.  We will culture to be sure, monitor for now.  POCT Urinalysis    URINE CULTURE(NEW)   2. Primary osteoarthritis involving multiple joints   stable on current medication regimen, no interval change.  Consent for opiate prescription reviewed and updated today.   report reviewed, I determined this prescription to be medically necessary.  Risks discussed.  UDS sent  HYDROcodone-acetaminophen (NORCO) 5-325 MG Tab per tablet    HYDROcodone-acetaminophen (NORCO) 5-325 MG Tab per tablet    HYDROcodone-acetaminophen (NORCO) 5-325 MG Tab per tablet   3. Chronic midline thoracic back pain  PAIN MANAGEMENT SCRN, UR    Consent for Opiate Prescription       Followup: 3 months, sooner as needed         Please note that this dictation was created using voice recognition software. I have worked with consultants from the vendor as well as technical experts from [facility] [employer] to optimize the interface. I have made every reasonable attempt to correct obvious errors, but I expect that there are errors of grammar and possibly content that I did not discover before finalizing the note.

## 2021-10-21 DIAGNOSIS — R35.0 URINARY FREQUENCY: ICD-10-CM

## 2021-10-23 RX ORDER — NITROFURANTOIN 25; 75 MG/1; MG/1
100 CAPSULE ORAL 2 TIMES DAILY
Qty: 10 CAPSULE | Refills: 0 | Status: SHIPPED | OUTPATIENT
Start: 2021-10-23 | End: 2022-01-27

## 2021-11-02 DIAGNOSIS — I10 ESSENTIAL HYPERTENSION: ICD-10-CM

## 2021-11-02 RX ORDER — HYDROCHLOROTHIAZIDE 25 MG/1
TABLET ORAL
Qty: 90 TABLET | Refills: 1 | Status: SHIPPED | OUTPATIENT
Start: 2021-11-02 | End: 2022-05-27 | Stop reason: SDUPTHER

## 2021-11-26 ENCOUNTER — PATIENT MESSAGE (OUTPATIENT)
Dept: MEDICAL GROUP | Facility: MEDICAL CENTER | Age: 81
End: 2021-11-26

## 2021-11-26 DIAGNOSIS — K21.9 GASTROESOPHAGEAL REFLUX DISEASE WITHOUT ESOPHAGITIS: ICD-10-CM

## 2021-11-29 RX ORDER — OMEPRAZOLE 40 MG/1
40 CAPSULE, DELAYED RELEASE ORAL
Qty: 90 CAPSULE | Refills: 1 | Status: SHIPPED | OUTPATIENT
Start: 2021-11-29 | End: 2022-05-27 | Stop reason: SDUPTHER

## 2021-11-29 NOTE — TELEPHONE ENCOUNTER
From: Randi Bello  To: Nurse Practitioner Mansi Thakur  Sent: 2021 12:55 PM PST  Subject: prescription    Hi Mansi, Could you please renew presc. for Omeprazole? Pharmacy said it .  Hoping to see you before you leave.  Kelly

## 2021-12-08 ENCOUNTER — OFFICE VISIT (OUTPATIENT)
Dept: MEDICAL GROUP | Facility: MEDICAL CENTER | Age: 81
End: 2021-12-08
Payer: MEDICARE

## 2021-12-08 VITALS
BODY MASS INDEX: 28.45 KG/M2 | TEMPERATURE: 97.8 F | DIASTOLIC BLOOD PRESSURE: 64 MMHG | OXYGEN SATURATION: 94 % | HEIGHT: 65 IN | SYSTOLIC BLOOD PRESSURE: 138 MMHG | WEIGHT: 170.75 LBS | RESPIRATION RATE: 18 BRPM | HEART RATE: 60 BPM

## 2021-12-08 DIAGNOSIS — G89.29 CHRONIC MIDLINE THORACIC BACK PAIN: ICD-10-CM

## 2021-12-08 DIAGNOSIS — A09 DIARRHEA OF INFECTIOUS ORIGIN: ICD-10-CM

## 2021-12-08 DIAGNOSIS — M54.6 CHRONIC MIDLINE THORACIC BACK PAIN: ICD-10-CM

## 2021-12-08 PROCEDURE — 99213 OFFICE O/P EST LOW 20 MIN: CPT | Performed by: NURSE PRACTITIONER

## 2021-12-08 ASSESSMENT — FIBROSIS 4 INDEX: FIB4 SCORE: 1.17

## 2021-12-08 NOTE — ASSESSMENT & PLAN NOTE
She has been more active recently, trying to get back to some light exercise, and feels that it has helped her pain overall.  She has continued with Norco 1-2 times daily as needed, avoiding NSAIDs due to chronic kidney disease.  She is not needing refill at this time.  No difficulty with constipation or nausea associated with pain medication

## 2021-12-08 NOTE — ASSESSMENT & PLAN NOTE
About 2 weeks ago she developed some generalized abdominal discomfort, slight nausea, and had about 4 days of diarrhea which then seemed to slowly improve.  At this time she is back to having normal bowel movements, no further nausea.  Feels that her acid reflux is controlled with omeprazole.  She did cut out dairy and notes that this has seemed to help.  No acute abdominal pain, vomiting, blood or mucus in stool.  No fever, chills

## 2021-12-08 NOTE — PROGRESS NOTES
Subjective:     Chief Complaint   Patient presents with   • Diarrhea     x3-4 days     Randi Bello is a 81 y.o. female here today to follow up on:    Chronic midline thoracic back pain  She has been more active recently, trying to get back to some light exercise, and feels that it has helped her pain overall.  She has continued with Norco 1-2 times daily as needed, avoiding NSAIDs due to chronic kidney disease.  She is not needing refill at this time.  No difficulty with constipation or nausea associated with pain medication    Diarrhea of infectious origin  About 2 weeks ago she developed some generalized abdominal discomfort, slight nausea, and had about 4 days of diarrhea which then seemed to slowly improve.  At this time she is back to having normal bowel movements, no further nausea.  Feels that her acid reflux is controlled with omeprazole.  She did cut out dairy and notes that this has seemed to help.  No acute abdominal pain, vomiting, blood or mucus in stool.  No fever, chills       Current medicines (including changes today)  Current Outpatient Medications   Medication Sig Dispense Refill   • omeprazole (PRILOSEC) 40 MG delayed-release capsule Take 1 Capsule by mouth every day. 90 Capsule 1   • hydroCHLOROthiazide (HYDRODIURIL) 25 MG Tab TAKE 1 TABLET BY MOUTH EVERY DAY 90 Tablet 1   • nitrofurantoin (MACROBID) 100 MG Cap Take 1 Capsule by mouth 2 times a day. 10 Capsule 0   • HYDROcodone-acetaminophen (NORCO) 5-325 MG Tab per tablet Take 1 Tablet by mouth every 12 hours as needed for up to 30 days. 60 Tablet 0   • [START ON 12/19/2021] HYDROcodone-acetaminophen (NORCO) 5-325 MG Tab per tablet Take 1 Tablet by mouth every 12 hours as needed for up to 30 days. 60 Tablet 0   • fluticasone (FLONASE) 50 MCG/ACT nasal spray ADMINISTER 1 SPRAY INTO AFFECTED NOSTRIL(S) EVERY DAY. 48 mL 1   • clopidogrel (PLAVIX) 75 MG Tab Take 1 tablet by mouth every day. 90 tablet 3   • diclofenac sodium 1 % Gel Apply 4  "g topically every 8 hours as needed. (Patient not taking: Reported on 8/12/2021) 100 g 0   • fluticasone (FLOVENT HFA) 220 MCG/ACT Aerosol Inhale 1 Puff 2 times a day. 12 g 5   • atorvastatin (LIPITOR) 20 MG Tab Take 1 Tab by mouth every day. TAKE 1 TAB BY MOUTH EVERY DAY. 100 Tab 3   • Ferrous Sulfate (IRON PO) Take 1 Tab by mouth every day.     • Cholecalciferol (D3 VITAMIN PO) Take 1 Cap by mouth every day.     • albuterol 108 (90 Base) MCG/ACT Aero Soln inhalation aerosol Inhale 2 Puffs by mouth every 6 hours as needed for Shortness of Breath. 8.5 g 3   • aspirin (ASA) 325 MG Tab Take 325 mg by mouth every day. Pt takes one tablet daily but took and extra ASPIRIN 325MG today       No current facility-administered medications for this visit.     She  has a past medical history of Anesthesia, Anxiety (6/9/2016), Bell palsy (5/14/2015), Chronic fatigue (1/15/2016), CKD (chronic kidney disease) stage 3, GFR 30-59 ml/min ([facility]) (3/3/2018), COPD (chronic obstructive pulmonary disease) ([facility]), Dental disorder, Dyslipidemia (1/4/2017), Environmental allergies (7/5/2017), GERD (gastroesophageal reflux disease), Hair thinning (1/15/2016), Hiatus hernia syndrome, History of esophageal cancer, Hypertension (4/1/2015), Myocardial infarct ([facility]) (1996), Pneumonia (2007), Post-menopausal osteoporosis (3/3/2018), Primary osteoarthritis involving multiple joints (12/12/2017), Raynaud's disease (1/15/2016), and Shingles (2014).    ROS included above     Objective:     /64 (BP Location: Left arm, Patient Position: Sitting, BP Cuff Size: Adult)   Pulse 60   Temp 36.6 °C (97.8 °F) (Temporal)   Resp 18   Ht 1.651 m (5' 5\")   Wt 77.4 kg (170 lb 11.9 oz)   SpO2 94%  Body mass index is 28.41 kg/m².     Physical Exam:  General: Alert, oriented in no acute distress.  Eye contact is good, speech is normal, affect calm  HEENT: Oral mucosa pink moist, no lesions. TMs gray with good landmarks bilaterally. No lymphadenopathy.  Lungs: " clear to auscultation bilaterally, normal effort, no wheeze/ rhonchi/ rales.  CV: regular rate and rhythm, S1, S2, no murmur  Abdomen: soft, nondistended, normal bowel sounds.  Slight abdominal tenderness throughout, no rebound or right lower quadrant tenderness to deep palpation  Ext: no edema, color normal, vascularity normal, temperature normal    Assessment and Plan:   The following treatment plan was discussed   1. Diarrhea of infectious origin   she had approximately 4 days of abdominal discomfort, nausea, and diarrhea, which are now seemingly resolved.  Encouraged to continue with bland diet for a few additional days, monitor for any changes.  She will reach out to me with any further concerns   2. Chronic midline thoracic back pain   she has been more active recently and is trying to gradually increase her exercise regimen.  Encouraged to continue with activity as tolerated, may continue with Norco 1-2 times daily as she is benefiting from this.  No refills provided today       Followup: As needed         Please note that this dictation was created using voice recognition software. I have worked with consultants from the vendor as well as technical experts from [facility] [employer] to optimize the interface. I have made every reasonable attempt to correct obvious errors, but I expect that there are errors of grammar and possibly content that I did not discover before finalizing the note.

## 2021-12-21 ENCOUNTER — PATIENT MESSAGE (OUTPATIENT)
Dept: MEDICAL GROUP | Facility: MEDICAL CENTER | Age: 81
End: 2021-12-21

## 2021-12-22 ENCOUNTER — PATIENT MESSAGE (OUTPATIENT)
Dept: MEDICAL GROUP | Facility: MEDICAL CENTER | Age: 81
End: 2021-12-22

## 2022-01-21 ENCOUNTER — PATIENT MESSAGE (OUTPATIENT)
Dept: HEALTH INFORMATION MANAGEMENT | Facility: OTHER | Age: 82
End: 2022-01-21
Payer: MEDICARE

## 2022-01-27 ENCOUNTER — OFFICE VISIT (OUTPATIENT)
Dept: MEDICAL GROUP | Facility: MEDICAL CENTER | Age: 82
End: 2022-01-27
Payer: MEDICARE

## 2022-01-27 VITALS
WEIGHT: 171.3 LBS | TEMPERATURE: 97.2 F | HEART RATE: 74 BPM | DIASTOLIC BLOOD PRESSURE: 74 MMHG | HEIGHT: 65 IN | BODY MASS INDEX: 28.54 KG/M2 | SYSTOLIC BLOOD PRESSURE: 134 MMHG | OXYGEN SATURATION: 94 %

## 2022-01-27 DIAGNOSIS — Z13.21 ENCOUNTER FOR VITAMIN DEFICIENCY SCREENING: ICD-10-CM

## 2022-01-27 DIAGNOSIS — Z23 IMMUNIZATION DUE: ICD-10-CM

## 2022-01-27 DIAGNOSIS — R53.83 FATIGUE, UNSPECIFIED TYPE: ICD-10-CM

## 2022-01-27 DIAGNOSIS — H35.3221 EXUDATIVE AGE-RELATED MACULAR DEGENERATION OF LEFT EYE WITH ACTIVE CHOROIDAL NEOVASCULARIZATION (HCC): ICD-10-CM

## 2022-01-27 DIAGNOSIS — I25.10 CORONARY ARTERY DISEASE INVOLVING NATIVE CORONARY ARTERY OF NATIVE HEART WITHOUT ANGINA PECTORIS: ICD-10-CM

## 2022-01-27 DIAGNOSIS — Z13.0 SCREENING FOR DEFICIENCY ANEMIA: ICD-10-CM

## 2022-01-27 DIAGNOSIS — M79.661 RIGHT CALF PAIN: ICD-10-CM

## 2022-01-27 DIAGNOSIS — J43.1 PANLOBULAR EMPHYSEMA (HCC): ICD-10-CM

## 2022-01-27 DIAGNOSIS — I10 PRIMARY HYPERTENSION: ICD-10-CM

## 2022-01-27 DIAGNOSIS — Z13.820 ENCOUNTER FOR OSTEOPOROSIS SCREENING IN ASYMPTOMATIC POSTMENOPAUSAL PATIENT: ICD-10-CM

## 2022-01-27 DIAGNOSIS — R73.03 PREDIABETES: ICD-10-CM

## 2022-01-27 DIAGNOSIS — N18.30 STAGE 3 CHRONIC KIDNEY DISEASE, UNSPECIFIED WHETHER STAGE 3A OR 3B CKD: ICD-10-CM

## 2022-01-27 DIAGNOSIS — Z78.0 ENCOUNTER FOR OSTEOPOROSIS SCREENING IN ASYMPTOMATIC POSTMENOPAUSAL PATIENT: ICD-10-CM

## 2022-01-27 DIAGNOSIS — E78.5 DYSLIPIDEMIA: ICD-10-CM

## 2022-01-27 DIAGNOSIS — Z85.01 HISTORY OF ESOPHAGEAL CANCER: ICD-10-CM

## 2022-01-27 PROBLEM — G93.31 POSTVIRAL FATIGUE SYNDROME: Status: RESOLVED | Noted: 2021-03-09 | Resolved: 2022-01-27

## 2022-01-27 PROBLEM — R10.2 SUPRAPUBIC PRESSURE: Status: RESOLVED | Noted: 2019-01-28 | Resolved: 2022-01-27

## 2022-01-27 PROBLEM — H53.9 VISION CHANGES: Status: RESOLVED | Noted: 2021-08-12 | Resolved: 2022-01-27

## 2022-01-27 PROBLEM — A09 DIARRHEA OF INFECTIOUS ORIGIN: Status: RESOLVED | Noted: 2021-12-08 | Resolved: 2022-01-27

## 2022-01-27 PROCEDURE — 90471 IMMUNIZATION ADMIN: CPT | Performed by: FAMILY MEDICINE

## 2022-01-27 PROCEDURE — 99214 OFFICE O/P EST MOD 30 MIN: CPT | Mod: 25 | Performed by: FAMILY MEDICINE

## 2022-01-27 PROCEDURE — 90715 TDAP VACCINE 7 YRS/> IM: CPT | Performed by: FAMILY MEDICINE

## 2022-01-27 ASSESSMENT — ENCOUNTER SYMPTOMS
DIARRHEA: 0
NAUSEA: 0
DIZZINESS: 0
WEAKNESS: 0
WEIGHT LOSS: 0
CONSTIPATION: 0
PALPITATIONS: 0
DEPRESSION: 0
BRUISES/BLEEDS EASILY: 0
BLURRED VISION: 0
COUGH: 0
MYALGIAS: 1
SORE THROAT: 0
ABDOMINAL PAIN: 1
NERVOUS/ANXIOUS: 0
FEVER: 0
DOUBLE VISION: 0
CHILLS: 0
TINGLING: 0
SHORTNESS OF BREATH: 0
VOMITING: 0

## 2022-01-27 ASSESSMENT — FIBROSIS 4 INDEX: FIB4 SCORE: 1.17

## 2022-01-27 ASSESSMENT — PATIENT HEALTH QUESTIONNAIRE - PHQ9: CLINICAL INTERPRETATION OF PHQ2 SCORE: 0

## 2022-01-27 NOTE — ASSESSMENT & PLAN NOTE
Establishing with a new gastroenterologist for a second opinion for diagnosis of esophageal cancer.  She went to her old gastroenterologist who commented that he thought she had esophageal cancer.  She is concerned that maybe she did not.

## 2022-01-27 NOTE — ASSESSMENT & PLAN NOTE
Supposed to be taking atorvastatin 20 mg every evening, but recently stopped taking this medication as she found out from her local pharmacist that this interacts with her Plavix.  She has been taking both medications for a substantial period of time, normal cholesterol panels in the past while taking this medication.

## 2022-01-27 NOTE — ASSESSMENT & PLAN NOTE
Stopped taking her atorvastatin due to concerns of interacting with her Plavix.    Continues to take her Plavix due to a history of a heart attack.    Denies any chest pain, difficulty breathing, diaphoresis.

## 2022-01-27 NOTE — ASSESSMENT & PLAN NOTE
Had lens replacements to the left eye in the past, now following with the family optometrist who does routine imaging of her eye monitoring her macular degeneration.

## 2022-01-27 NOTE — ASSESSMENT & PLAN NOTE
3 weeks ago started to develop right calf discomfort.  Had left calf pain that she noticed while walking but that improved.  The pain to her right calf continues.  Increase in pain and muscle spasms after walking short distances.  Was taking the atorvastatin and Plavix together, concerned that the atorvastatin caused decreased in effectiveness of the Plavix.  We will like to check to make sure she does not have a blood clot to her leg.  Denies any swelling, erythema to her right lower extremity

## 2022-01-27 NOTE — ASSESSMENT & PLAN NOTE
This is a chronic condition for the patient.  Stable on albuterol and Flovent, tolerating medications well.  Does not always use the albuterol and Flovent, but will use if she feels as though her symptoms are worsening.  Does feel like the medication does help with her symptoms when they do occur.  Denies any chest pain, shortness of breath, difficulty breathing, cough.

## 2022-01-27 NOTE — PROGRESS NOTES
Randi Bello is a pleasant 81 y.o. female here to establish care.    HPI:   Panlobular emphysema (HCC)  This is a chronic condition for the patient.  Stable on albuterol and Flovent, tolerating medications well.  Does not always use the albuterol and Flovent, but will use if she feels as though her symptoms are worsening.  Does feel like the medication does help with her symptoms when they do occur.  Denies any chest pain, shortness of breath, difficulty breathing, cough.    Exudative age-related macular degeneration of left eye with active choroidal neovascularization (HCC)  Had lens replacements to the left eye in the past, now following with the family optometrist who does routine imaging of her eye monitoring her macular degeneration.    Coronary artery disease involving native coronary artery of native heart without angina pectoris  Stopped taking her atorvastatin due to concerns of interacting with her Plavix.    Continues to take her Plavix due to a history of a heart attack.    Denies any chest pain, difficulty breathing, diaphoresis.    Hypertension  Oertli taking hydrochlorothiazide 25 mg daily to help with her elevated blood pressure.    Pressures at home 120/60s.  Nuys chest pain, shortness breath, difficulty breathing, dizziness, cough.    History of esophageal cancer  Establishing with a new gastroenterologist for a second opinion for diagnosis of esophageal cancer.  She went to her old gastroenterologist who commented that he thought she had esophageal cancer.  She is concerned that maybe she did not.    Right calf pain  3 weeks ago started to develop right calf discomfort.  Had left calf pain that she noticed while walking but that improved.  The pain to her right calf continues.  Increase in pain and muscle spasms after walking short distances.  Was taking the atorvastatin and Plavix together, concerned that the atorvastatin caused decreased in effectiveness of the Plavix.  We will like to  check to make sure she does not have a blood clot to her leg.  Denies any swelling, erythema to her right lower extremity    Dyslipidemia  Supposed to be taking atorvastatin 20 mg every evening, but recently stopped taking this medication as she found out from her local pharmacist that this interacts with her Plavix.  She has been taking both medications for a substantial period of time, normal cholesterol panels in the past while taking this medication.    CKD (chronic kidney disease) stage 3, GFR 30-59 ml/min ([facility])  Continues to ensure that she is working plenty of fluids to help support her kidneys.  Last blood test was 08/31/2021 which showed a stable GFR at 45.  Avoiding the use of NSAIDs.  No decline in urinary output nor dark urine.      Health Maintenance  Immunization: Patient is due for influenza but like to hold off for this for now.  She is also due for tetanus would like to be updated today.  Annual wellness visit: Patient is due  DEXA scan: Patient is due  Pulmonary function testing: Patient is due    Current medicines (including changes today)  Current Outpatient Medications   Medication Sig Dispense Refill   • Probiotic Product (PROBIOTIC PO) Take  by mouth.     • omeprazole (PRILOSEC) 40 MG delayed-release capsule Take 1 Capsule by mouth every day. 90 Capsule 1   • hydroCHLOROthiazide (HYDRODIURIL) 25 MG Tab TAKE 1 TABLET BY MOUTH EVERY DAY 90 Tablet 1   • fluticasone (FLONASE) 50 MCG/ACT nasal spray ADMINISTER 1 SPRAY INTO AFFECTED NOSTRIL(S) EVERY DAY. 48 mL 1   • clopidogrel (PLAVIX) 75 MG Tab Take 1 tablet by mouth every day. 90 tablet 3   • diclofenac sodium 1 % Gel Apply 4 g topically every 8 hours as needed. 100 g 0   • fluticasone (FLOVENT HFA) 220 MCG/ACT Aerosol Inhale 1 Puff 2 times a day. 12 g 5   • atorvastatin (LIPITOR) 20 MG Tab Take 1 Tab by mouth every day. TAKE 1 TAB BY MOUTH EVERY DAY. 100 Tab 3   • Ferrous Sulfate (IRON PO) Take 1 Tab by mouth every day.     • Cholecalciferol (D3  VITAMIN PO) Take 1 Cap by mouth every day.     • albuterol 108 (90 Base) MCG/ACT Aero Soln inhalation aerosol Inhale 2 Puffs by mouth every 6 hours as needed for Shortness of Breath. 8.5 g 3   • aspirin (ASA) 325 MG Tab Take 325 mg by mouth every day. Pt takes one tablet daily but took and extra ASPIRIN 325MG today       No current facility-administered medications for this visit.       Past Medical/ Surgical History  She  has a past medical history of Anesthesia, Anxiety (2016), Bell palsy (2015), Chronic fatigue (1/15/2016), CKD (chronic kidney disease) stage 3, GFR 30-59 ml/min ([facility]) (3/3/2018), COPD (chronic obstructive pulmonary disease) ([facility]), Dental disorder, Dyslipidemia (2017), Environmental allergies (2017), GERD (gastroesophageal reflux disease), Hair thinning (1/15/2016), Hiatus hernia syndrome, History of Bell's palsy (2015), History of esophageal cancer, Hypertension (2015), Myocardial infarct ([facility]) (), Pneumonia (), Post-menopausal osteoporosis (3/3/2018), Primary osteoarthritis involving multiple joints (2017), Raynaud's disease (1/15/2016), and Shingles ().  She  has a past surgical history that includes appendectomy (); other cardiac surgery (); other (); other (); vein ligation radio frequency (2011); carotid endarterectomy (2011); cataract phaco with iol (Left, 2016); and cataract phaco with iol (Right, 11/15/2016).    Social History  Social History     Tobacco Use   • Smoking status: Former Smoker     Packs/day: 2.00     Years: 25.00     Pack years: 50.00     Types: Cigarettes     Quit date: 1996     Years since quittin.6   • Smokeless tobacco: Never Used   Vaping Use   • Vaping Use: Never used   Substance Use Topics   • Alcohol use: Yes     Alcohol/week: 0.0 oz     Comment: once a month   • Drug use: No     Social History     Social History Narrative    Works at [employer], lives in [place], , lives alone  "with daughters nearby        Family History  Family History   Problem Relation Age of Onset   • Cancer Father         lung, smoker   • Cancer Brother         kidney, liver   • Arthritis Sister         Rheumatoid Arthritis     Family Status   Relation Name Status   • Fa     • Bro 4    • Mo   at age 90        \"old age\"   • Sis  Alive   • Lilia  Alive   • Lilia  Alive       Review of Systems   Constitutional: Negative for chills, fever, malaise/fatigue and weight loss.   HENT: Negative for hearing loss and sore throat.    Eyes: Negative for blurred vision and double vision.   Respiratory: Negative for cough and shortness of breath.    Cardiovascular: Negative for chest pain, palpitations and leg swelling.   Gastrointestinal: Positive for abdominal pain. Negative for constipation, diarrhea, nausea and vomiting.   Musculoskeletal: Positive for myalgias (Right calf discomfort).   Skin: Negative for rash.   Neurological: Negative for dizziness, tingling and weakness.   Endo/Heme/Allergies: Does not bruise/bleed easily.   Psychiatric/Behavioral: Negative for depression. The patient is not nervous/anxious.          Objective:     /74 (BP Location: Right arm, Patient Position: Sitting, BP Cuff Size: Adult)   Pulse 74   Temp 36.2 °C (97.2 °F) (Temporal)   Ht 1.651 m (5' 5\")   Wt 77.7 kg (171 lb 4.8 oz)   SpO2 94%  Body mass index is 28.51 kg/m².    Physical Exam  Constitutional:       General: She is not in acute distress.  HENT:      Head: Normocephalic and atraumatic.      Right Ear: External ear normal.      Left Ear: External ear normal.   Eyes:      General: Lids are normal.      Extraocular Movements: Extraocular movements intact.      Conjunctiva/sclera: Conjunctivae normal.      Pupils: Pupils are equal, round, and reactive to light.   Neck:      Trachea: Trachea normal.   Cardiovascular:      Rate and Rhythm: Normal rate and regular rhythm.      Heart sounds: Normal heart sounds. No " murmur heard.  No friction rub. No gallop.    Pulmonary:      Effort: Pulmonary effort is normal. No accessory muscle usage.      Breath sounds: Normal breath sounds. No wheezing or rales.   Abdominal:      General: Bowel sounds are normal.      Palpations: Abdomen is soft.      Tenderness: There is abdominal tenderness in the right lower quadrant and left upper quadrant. There is no guarding or rebound.   Musculoskeletal:      Cervical back: Normal range of motion and neck supple.      Right lower leg: Tenderness (Right mid calf discomfort with palpation) present. No edema.      Left lower leg: No edema.   Lymphadenopathy:      Cervical: No cervical adenopathy.   Skin:     General: Skin is warm and dry.      Findings: No rash.   Neurological:      General: No focal deficit present.      Mental Status: She is alert and oriented to person, place, and time.      GCS: GCS eye subscore is 4. GCS verbal subscore is 5. GCS motor subscore is 6.      Gait: Gait is intact.      Deep Tendon Reflexes:      Reflex Scores:       Brachioradialis reflexes are 2+ on the right side and 2+ on the left side.       Patellar reflexes are 2+ on the right side and 2+ on the left side.  Psychiatric:         Attention and Perception: Attention normal.         Mood and Affect: Affect normal.         Speech: Speech normal.        Imaging:  No recent imaging to review    Labs:  Most recent labs from 03/12/2021 and 08/31/2021 reviewed with patient.  Assessment and Plan:   The following treatment plan was discussed    1. Right calf pain  Acute.  We will go ahead and check patient's PT/INR, and get an ultrasound to rule out possible thrombus.  Suspicions for thrombus are low as she does not have any abnormal swelling to that side.  Due to patient's concern we will go ahead and get the ultrasound.  I suspect that this may be related to some musculoskeletal discomfort she is experiencing.  - Prothrombin Time; Future  - US-EXTREMITY VENOUS LOWER  UNILAT RIGHT; Future    2. Stage 3 chronic kidney disease, unspecified whether stage 3a or 3b CKD (HCC)  Chronic, stable.  I recommend that she continue to drink plenty of fluids and avoid the use of kidney toxic medications such as NSAIDs.  - ESTIMATED GFR; Future    3. Panlobular emphysema (HCC)  Chronic, stable.  Recommend patient continue with her inhalers for symptom relief.  We will go ahead and obtain a pulmonary function test to ensure that there is no change in her status.  - PULMONARY FUNCTION TESTS -Test requested: Complete Pulmonary Function Test; Include MIPS/MEPS? No; Future    4. Dyslipidemia  Chronic, unstable.  I recommended that the patient continue with the atorvastatin for concern that she may be laying down new plaque.  I also recommended making dietary changes to help with her overall cholesterol levels.  - Lipid Profile; Future    5. Exudative age-related macular degeneration of left eye with active choroidal neovascularization (HCC)  Chronic, stable.  Recommend that she continue to follow-up with ophthalmology for routine imaging.    6. Coronary artery disease involving native coronary artery of native heart without angina pectoris  Chronic, stable.  I recommend that she continue to take her Plavix as previously prescribed by her cardiologist.  Also recommended that she be careful with falls as this may increase her risk for bleeding.    7. Primary hypertension  Chronic, stable.  Recommend that she continue to take her hydrochlorothiazide as it is doing a great job of keeping her blood pressure in control.    8. History of esophageal cancer  Chronic.  I recommended that she continue to follow-up with gastroenterology for a second opinion on her esophageal cancer.    9. Fatigue, unspecified type  Chronic.  Patient is reporting a worsening of her fatigue will just ensure that her vitamin D levels and thyroid levels are also at a normal level.  May be secondary to the Covid infection that she  has had some time ago.  - VITAMIN D,25 HYDROXY; Future  - TSH WITH REFLEX TO FT4; Future    10. Prediabetes  Chronic, stable.  I recommended that we repeat her chemistry and an A1c to ensure that her prediabetes remains under control.  - Comp Metabolic Panel; Future  - HEMOGLOBIN A1C; Future    11. Immunization due  She is due for her Tdap vaccine we will go get her updated today.  - Tdap Vaccine =>8YO IM    12. Encounter for osteoporosis screening in asymptomatic postmenopausal patient  Due for her DEXA, will go get her updated.  - DS-BONE DENSITY STUDY (DEXA); Future    13. Encounter for vitamin deficiency screening  - VITAMIN D,25 HYDROXY; Future    14. Screening for deficiency anemia  - CBC WITH DIFFERENTIAL; Future      Records requested.  Followup: Return in about 6 weeks (around 3/10/2022) for follow-up and labs.    I have placed vaccination orders.  The MA is preforming vaccination orders under the direction of Dr. Mendieta    Please note that this dictation was created using voice recognition software. I have made every reasonable attempt to correct obvious errors, but I expect that there are errors of grammar and possibly content that I did not discover before finalizing the note.

## 2022-01-27 NOTE — ASSESSMENT & PLAN NOTE
Continues to ensure that she is working plenty of fluids to help support her kidneys.  Last blood test was 08/31/2021 which showed a stable GFR at 45.  Avoiding the use of NSAIDs.  No decline in urinary output nor dark urine.

## 2022-01-27 NOTE — ASSESSMENT & PLAN NOTE
Daniel taking hydrochlorothiazide 25 mg daily to help with her elevated blood pressure.    Pressures at home 120/60s.  Nuys chest pain, shortness breath, difficulty breathing, dizziness, cough.

## 2022-01-28 ENCOUNTER — HOSPITAL ENCOUNTER (OUTPATIENT)
Dept: LAB | Facility: MEDICAL CENTER | Age: 82
End: 2022-01-28
Attending: FAMILY MEDICINE
Payer: MEDICARE

## 2022-01-28 DIAGNOSIS — E78.5 DYSLIPIDEMIA: ICD-10-CM

## 2022-01-28 DIAGNOSIS — Z13.0 SCREENING FOR DEFICIENCY ANEMIA: ICD-10-CM

## 2022-01-28 DIAGNOSIS — Z13.21 ENCOUNTER FOR VITAMIN DEFICIENCY SCREENING: ICD-10-CM

## 2022-01-28 DIAGNOSIS — R53.83 FATIGUE, UNSPECIFIED TYPE: ICD-10-CM

## 2022-01-28 DIAGNOSIS — M79.661 RIGHT CALF PAIN: ICD-10-CM

## 2022-01-28 DIAGNOSIS — N18.30 STAGE 3 CHRONIC KIDNEY DISEASE, UNSPECIFIED WHETHER STAGE 3A OR 3B CKD: ICD-10-CM

## 2022-01-28 DIAGNOSIS — R73.03 PREDIABETES: ICD-10-CM

## 2022-01-28 LAB
25(OH)D3 SERPL-MCNC: 46 NG/ML (ref 30–100)
ALBUMIN SERPL BCP-MCNC: 4.4 G/DL (ref 3.2–4.9)
ALBUMIN/GLOB SERPL: 1.2 G/DL
ALP SERPL-CCNC: 104 U/L (ref 30–99)
ALT SERPL-CCNC: 17 U/L (ref 2–50)
ANION GAP SERPL CALC-SCNC: 11 MMOL/L (ref 7–16)
AST SERPL-CCNC: 17 U/L (ref 12–45)
BASOPHILS # BLD AUTO: 0.7 % (ref 0–1.8)
BASOPHILS # BLD: 0.09 K/UL (ref 0–0.12)
BILIRUB SERPL-MCNC: 0.4 MG/DL (ref 0.1–1.5)
BUN SERPL-MCNC: 26 MG/DL (ref 8–22)
CALCIUM SERPL-MCNC: 9.9 MG/DL (ref 8.5–10.5)
CHLORIDE SERPL-SCNC: 101 MMOL/L (ref 96–112)
CHOLEST SERPL-MCNC: 237 MG/DL (ref 100–199)
CO2 SERPL-SCNC: 28 MMOL/L (ref 20–33)
CREAT SERPL-MCNC: 1.28 MG/DL (ref 0.5–1.4)
EOSINOPHIL # BLD AUTO: 0.18 K/UL (ref 0–0.51)
EOSINOPHIL NFR BLD: 1.5 % (ref 0–6.9)
ERYTHROCYTE [DISTWIDTH] IN BLOOD BY AUTOMATED COUNT: 46.1 FL (ref 35.9–50)
EST. AVERAGE GLUCOSE BLD GHB EST-MCNC: 131 MG/DL
FASTING STATUS PATIENT QL REPORTED: NORMAL
GLOBULIN SER CALC-MCNC: 3.7 G/DL (ref 1.9–3.5)
GLUCOSE SERPL-MCNC: 116 MG/DL (ref 65–99)
HBA1C MFR BLD: 6.2 % (ref 4–5.6)
HCT VFR BLD AUTO: 47.2 % (ref 37–47)
HDLC SERPL-MCNC: 36 MG/DL
HGB BLD-MCNC: 15.3 G/DL (ref 12–16)
IMM GRANULOCYTES # BLD AUTO: 0.05 K/UL (ref 0–0.11)
IMM GRANULOCYTES NFR BLD AUTO: 0.4 % (ref 0–0.9)
INR PPP: 1.09 (ref 0.87–1.13)
LDLC SERPL CALC-MCNC: 172 MG/DL
LYMPHOCYTES # BLD AUTO: 4.53 K/UL (ref 1–4.8)
LYMPHOCYTES NFR BLD: 37.3 % (ref 22–41)
MCH RBC QN AUTO: 32.6 PG (ref 27–33)
MCHC RBC AUTO-ENTMCNC: 32.4 G/DL (ref 33.6–35)
MCV RBC AUTO: 100.4 FL (ref 81.4–97.8)
MONOCYTES # BLD AUTO: 0.98 K/UL (ref 0–0.85)
MONOCYTES NFR BLD AUTO: 8.1 % (ref 0–13.4)
NEUTROPHILS # BLD AUTO: 6.3 K/UL (ref 2–7.15)
NEUTROPHILS NFR BLD: 52 % (ref 44–72)
NRBC # BLD AUTO: 0 K/UL
NRBC BLD-RTO: 0 /100 WBC
PLATELET # BLD AUTO: 311 K/UL (ref 164–446)
PMV BLD AUTO: 11.3 FL (ref 9–12.9)
POTASSIUM SERPL-SCNC: 3.8 MMOL/L (ref 3.6–5.5)
PROT SERPL-MCNC: 8.1 G/DL (ref 6–8.2)
PROTHROMBIN TIME: 13.8 SEC (ref 12–14.6)
RBC # BLD AUTO: 4.7 M/UL (ref 4.2–5.4)
SODIUM SERPL-SCNC: 140 MMOL/L (ref 135–145)
TRIGL SERPL-MCNC: 143 MG/DL (ref 0–149)
TSH SERPL DL<=0.005 MIU/L-ACNC: 2.97 UIU/ML (ref 0.38–5.33)
WBC # BLD AUTO: 12.1 K/UL (ref 4.8–10.8)

## 2022-01-28 PROCEDURE — 82306 VITAMIN D 25 HYDROXY: CPT

## 2022-01-28 PROCEDURE — 83036 HEMOGLOBIN GLYCOSYLATED A1C: CPT

## 2022-01-28 PROCEDURE — 85025 COMPLETE CBC W/AUTO DIFF WBC: CPT

## 2022-01-28 PROCEDURE — 84443 ASSAY THYROID STIM HORMONE: CPT

## 2022-01-28 PROCEDURE — 80061 LIPID PANEL: CPT

## 2022-01-28 PROCEDURE — 80053 COMPREHEN METABOLIC PANEL: CPT

## 2022-01-28 PROCEDURE — 85610 PROTHROMBIN TIME: CPT

## 2022-01-28 PROCEDURE — 36415 COLL VENOUS BLD VENIPUNCTURE: CPT

## 2022-02-03 ENCOUNTER — PATIENT MESSAGE (OUTPATIENT)
Dept: MEDICAL GROUP | Facility: MEDICAL CENTER | Age: 82
End: 2022-02-03

## 2022-02-03 ENCOUNTER — HOSPITAL ENCOUNTER (OUTPATIENT)
Dept: RADIOLOGY | Facility: MEDICAL CENTER | Age: 82
End: 2022-02-03
Attending: FAMILY MEDICINE
Payer: MEDICARE

## 2022-02-03 DIAGNOSIS — M79.661 RIGHT CALF PAIN: ICD-10-CM

## 2022-02-03 DIAGNOSIS — E78.5 DYSLIPIDEMIA: ICD-10-CM

## 2022-02-03 PROCEDURE — 93971 EXTREMITY STUDY: CPT | Mod: RT

## 2022-02-04 RX ORDER — ROSUVASTATIN CALCIUM 5 MG/1
5 TABLET, COATED ORAL EVERY EVENING
Qty: 30 TABLET | Refills: 11 | Status: SHIPPED | OUTPATIENT
Start: 2022-02-04 | End: 2022-02-10 | Stop reason: SDUPTHER

## 2022-02-04 NOTE — PROGRESS NOTES
Referral to physical therapy sent for the patient in regards to her right calf discomfort.  She is currently taking atorvastatin, will recommend that we attempt to change of her medications, in the meantime we will have the referral placed for physical therapy.

## 2022-02-04 NOTE — PROGRESS NOTES
Patient reporting muscle cramping.  I recommended that she stop the atorvastatin and switch over to rosuvastatin.  New prescription sent to her preferred pharmacy.

## 2022-02-10 DIAGNOSIS — E78.5 DYSLIPIDEMIA: ICD-10-CM

## 2022-02-10 RX ORDER — ROSUVASTATIN CALCIUM 5 MG/1
5 TABLET, COATED ORAL EVERY EVENING
Qty: 100 TABLET | Refills: 3 | Status: SHIPPED | OUTPATIENT
Start: 2022-02-10 | End: 2022-02-24 | Stop reason: SDUPTHER

## 2022-02-10 NOTE — TELEPHONE ENCOUNTER
Received request via: Pharmacy    Was the patient seen in the last year in this department? Yes    Does the patient have an active prescription (recently filled or refills available) for medication(s) requested? Yes. insurance requires 100 day supply

## 2022-02-17 ENCOUNTER — OFFICE VISIT (OUTPATIENT)
Dept: MEDICAL GROUP | Facility: MEDICAL CENTER | Age: 82
End: 2022-02-17
Payer: MEDICARE

## 2022-02-17 VITALS
HEIGHT: 65 IN | SYSTOLIC BLOOD PRESSURE: 130 MMHG | HEART RATE: 75 BPM | TEMPERATURE: 97.5 F | OXYGEN SATURATION: 97 % | BODY MASS INDEX: 28.54 KG/M2 | WEIGHT: 171.3 LBS | DIASTOLIC BLOOD PRESSURE: 72 MMHG

## 2022-02-17 DIAGNOSIS — M79.661 RIGHT CALF PAIN: ICD-10-CM

## 2022-02-17 DIAGNOSIS — R73.03 PREDIABETES: ICD-10-CM

## 2022-02-17 DIAGNOSIS — D72.829 LEUKOCYTOSIS, UNSPECIFIED TYPE: ICD-10-CM

## 2022-02-17 DIAGNOSIS — N18.30 STAGE 3 CHRONIC KIDNEY DISEASE, UNSPECIFIED WHETHER STAGE 3A OR 3B CKD: ICD-10-CM

## 2022-02-17 DIAGNOSIS — E78.5 DYSLIPIDEMIA: ICD-10-CM

## 2022-02-17 LAB
APPEARANCE UR: CLEAR
BILIRUB UR STRIP-MCNC: NORMAL MG/DL
COLOR UR AUTO: YELLOW
GLUCOSE UR STRIP.AUTO-MCNC: NORMAL MG/DL
KETONES UR STRIP.AUTO-MCNC: NORMAL MG/DL
LEUKOCYTE ESTERASE UR QL STRIP.AUTO: NORMAL
NITRITE UR QL STRIP.AUTO: NORMAL
PH UR STRIP.AUTO: 6 [PH] (ref 5–8)
PROT UR QL STRIP: NORMAL MG/DL
RBC UR QL AUTO: NORMAL
SP GR UR STRIP.AUTO: 1.02
UROBILINOGEN UR STRIP-MCNC: 0.2 MG/DL

## 2022-02-17 PROCEDURE — 81002 URINALYSIS NONAUTO W/O SCOPE: CPT | Performed by: FAMILY MEDICINE

## 2022-02-17 PROCEDURE — 99214 OFFICE O/P EST MOD 30 MIN: CPT | Performed by: FAMILY MEDICINE

## 2022-02-17 ASSESSMENT — ENCOUNTER SYMPTOMS
DEPRESSION: 0
FEVER: 0
WEIGHT LOSS: 0
SHORTNESS OF BREATH: 0
PALPITATIONS: 0
MYALGIAS: 1
WEAKNESS: 0
ABDOMINAL PAIN: 0
DIZZINESS: 0
CHILLS: 0
COUGH: 0

## 2022-02-17 ASSESSMENT — FIBROSIS 4 INDEX: FIB4 SCORE: 1.09

## 2022-02-17 NOTE — ASSESSMENT & PLAN NOTE
Acute.  After patient reports that she noticed the discomfort after using the whole massager, I suspect that she caused injury to her self and due to her frequent walking she has not fully recovered from this injury.  I went ahead and placed a referral to physical therapy as I feel this might benefit the patient greatly.

## 2022-02-17 NOTE — ASSESSMENT & PLAN NOTE
Chronic, stable.  I recommended that the patient continue to drink plenty of fluids and avoid any nephrotoxic medications such as ibuprofen.  We will continue to watch this and repeated blood work.

## 2022-02-17 NOTE — ASSESSMENT & PLAN NOTE
Chronic, unstable.  I discussed with the patient that I would like for her to complete a urinalysis to rule out potential UTI as a source of the leukocytosis.  POCT UA was ordered for the patient to rule out possible urinary etiology, this came back negative.  We will continue to monitor her white count.  We did note some elevation to her MCV, recommended adding a vitamin B complex for her to take every other day due to her poor decreased GFR.

## 2022-02-17 NOTE — ASSESSMENT & PLAN NOTE
Chronic, stable.  I recommended the patient continue to use a low carbohydrate diet as well as physical activity as tolerated.

## 2022-02-17 NOTE — ASSESSMENT & PLAN NOTE
Chronic, unstable.  I recommended that the patient continue with the rosuvastatin to help lower her overall cholesterol levels.  I do suspect that we will need to increase her rosuvastatin to a higher dose to achieve good therapeutic effect with lower LDL.

## 2022-02-17 NOTE — PROGRESS NOTES
Randi Bello is a pleasant 81 y.o. female here for   Chief Complaint   Patient presents with   • Lab Results      HPI:   Problem   Right Calf Pain    Approximately 2 months ago patient reports bilateral calf discomfort after using a home massager to her lower extremity legs.  Right calf hurts more than the left.  Increase in pain and muscle spasms after walking short distances.  Was taking the atorvastatin and Plavix together, concerned that the atorvastatin caused decreased in effectiveness of the Plavix.  Recently started back on a cholesterol-lowering agent, rosuvastatin and has noticed no significant changes into the muscle cramping.  Concern for potential blood clot, had ultrasound done which was negative for DVT.  Denies any swelling, erythema to her right lower extremity.     Prediabetes    Recently had blood work completed which showed an A1c of 6.2.  Continues to control her sugars through diet and physical activity.     Ckd (Chronic Kidney Disease) Stage 3, Gfr 30-59 Ml/Min ([facility])    Patient recently had blood work completed which showed a GFR of 40.  BUN is slightly elevated though her creatinine is normal.  She is drinking plenty of water and is careful about nephrotoxic medications.     Dyslipidemia    Continues with the rosuvastatin 5 mg every evening.  Has not noticed any substantial changes to her lower extremity cramping.  Recent set of labs showed an elevation to her total cholesterol and LDL.     Leukocytosis    Has had a history of elevated white count, prior to her most recent set of labs she was admitted to the hospital.  Patient denies any discomfort other than lower extremity cramping with ambulation.          Health Maintenance  Immunization: Patient is due for influenza.  Annual wellness visit: Patient is due    Current Medicines (including changes today)  Current Outpatient Medications   Medication Sig Dispense Refill   • rosuvastatin (CRESTOR) 5 MG Tab Take 1 Tablet by mouth every  evening. 100 Tablet 3   • Probiotic Product (PROBIOTIC PO) Take  by mouth.     • omeprazole (PRILOSEC) 40 MG delayed-release capsule Take 1 Capsule by mouth every day. 90 Capsule 1   • hydroCHLOROthiazide (HYDRODIURIL) 25 MG Tab TAKE 1 TABLET BY MOUTH EVERY DAY 90 Tablet 1   • fluticasone (FLONASE) 50 MCG/ACT nasal spray ADMINISTER 1 SPRAY INTO AFFECTED NOSTRIL(S) EVERY DAY. 48 mL 1   • clopidogrel (PLAVIX) 75 MG Tab Take 1 tablet by mouth every day. 90 tablet 3   • diclofenac sodium 1 % Gel Apply 4 g topically every 8 hours as needed. 100 g 0   • fluticasone (FLOVENT HFA) 220 MCG/ACT Aerosol Inhale 1 Puff 2 times a day. 12 g 5   • Ferrous Sulfate (IRON PO) Take 1 Tab by mouth every day.     • Cholecalciferol (D3 VITAMIN PO) Take 1 Cap by mouth every day.     • albuterol 108 (90 Base) MCG/ACT Aero Soln inhalation aerosol Inhale 2 Puffs by mouth every 6 hours as needed for Shortness of Breath. 8.5 g 3   • aspirin (ASA) 325 MG Tab Take 325 mg by mouth every day. Pt takes one tablet daily but took and extra ASPIRIN 325MG today       No current facility-administered medications for this visit.     Past Medical/ Surgical History  She  has a past medical history of Anesthesia, Anxiety (6/9/2016), Bell palsy (5/14/2015), Chronic fatigue (1/15/2016), CKD (chronic kidney disease) stage 3, GFR 30-59 ml/min ([facility]) (3/3/2018), COPD (chronic obstructive pulmonary disease) ([facility]), Dental disorder, Dyslipidemia (1/4/2017), Environmental allergies (7/5/2017), GERD (gastroesophageal reflux disease), Hair thinning (1/15/2016), Hiatus hernia syndrome, History of Bell's palsy (5/14/2015), History of esophageal cancer, Hypertension (4/1/2015), Myocardial infarct ([facility]) (1996), Pneumonia (2007), Post-menopausal osteoporosis (3/3/2018), Primary osteoarthritis involving multiple joints (12/12/2017), Raynaud's disease (1/15/2016), and Shingles (2014).  She  has a past surgical history that includes appendectomy (1957); other cardiac surgery  "(); other (); other (); vein ligation radio frequency (2011); carotid endarterectomy (2011); cataract phaco with iol (Left, 2016); and cataract phaco with iol (Right, 11/15/2016).    Review of Systems   Constitutional: Negative for chills, fever, malaise/fatigue and weight loss.   Respiratory: Negative for cough and shortness of breath.    Cardiovascular: Negative for chest pain and palpitations.   Gastrointestinal: Negative for abdominal pain.   Genitourinary: Negative.    Musculoskeletal: Positive for myalgias (Bilateral lower extremities with right calf worse than left).   Skin: Negative for rash.   Neurological: Negative for dizziness and weakness.   Psychiatric/Behavioral: Negative for depression.         Objective:     /72 (BP Location: Left arm, Patient Position: Sitting, BP Cuff Size: Adult)   Pulse 75   Temp 36.4 °C (97.5 °F) (Temporal)   Ht 1.651 m (5' 5\")   Wt 77.7 kg (171 lb 4.8 oz)   SpO2 97%  Body mass index is 28.51 kg/m².    Physical Exam  Constitutional:       General: She is not in acute distress.  HENT:      Head: Normocephalic and atraumatic.   Eyes:      Conjunctiva/sclera: Conjunctivae normal.      Pupils: Pupils are equal, round, and reactive to light.   Pulmonary:      Effort: Pulmonary effort is normal. No respiratory distress.   Abdominal:      General: There is no distension.   Musculoskeletal:      Cervical back: Normal range of motion and neck supple.   Skin:     General: Skin is warm and dry.      Findings: No rash.   Neurological:      Mental Status: She is alert and oriented to person, place, and time.      Gait: Gait is intact.   Psychiatric:         Mood and Affect: Affect normal.        Imagin2022 US-extremity venous duplex: Negative for DVT    Labs  Recent labs from 2022 reviewed with the patient.    Lab Results   Component Value Date/Time    POCCOLOR yellow 2022 12:50 PM    POCAPPEAR clear 2022 12:50 PM    " POCLEUKEST neg 02/17/2022 12:50 PM    POCNITRITE neg 02/17/2022 12:50 PM    POCUROBILIGE 0.2 02/17/2022 12:50 PM    POCPROTEIN neg 02/17/2022 12:50 PM    POCURPH 6.0 02/17/2022 12:50 PM    POCBLOOD neg 02/17/2022 12:50 PM    POCSPGRV 1.025 02/17/2022 12:50 PM    POCKETONES trace 02/17/2022 12:50 PM    POCBILIRUBIN neg 02/17/2022 12:50 PM    POCGLUCUA neg 02/17/2022 12:50 PM      Assessment and Plan:   The following treatment plan was discussed    Problem List Items Addressed This Visit     Leukocytosis     Chronic, unstable.  I discussed with the patient that I would like for her to complete a urinalysis to rule out potential UTI as a source of the leukocytosis.  POCT UA was ordered for the patient to rule out possible urinary etiology, this came back negative.  We will continue to monitor her white count.  We did note some elevation to her MCV, recommended adding a vitamin B complex for her to take every other day due to her poor decreased GFR.         Relevant Orders    POCT Urinalysis (Completed)    Dyslipidemia     Chronic, unstable.  I recommended that the patient continue with the rosuvastatin to help lower her overall cholesterol levels.  I do suspect that we will need to increase her rosuvastatin to a higher dose to achieve good therapeutic effect with lower LDL.         CKD (chronic kidney disease) stage 3, GFR 30-59 ml/min ([facility])     Chronic, stable.  I recommended that the patient continue to drink plenty of fluids and avoid any nephrotoxic medications such as ibuprofen.  We will continue to watch this and repeated blood work.         Prediabetes     Chronic, stable.  I recommended the patient continue to use a low carbohydrate diet as well as physical activity as tolerated.         Right calf pain     Acute.  After patient reports that she noticed the discomfort after using the whole massager, I suspect that she caused injury to her self and due to her frequent walking she has not fully recovered from  this injury.  I went ahead and placed a referral to physical therapy as I feel this might benefit the patient greatly.                Followup: Return in about 3 months (around 5/17/2022) for annual wellness visit and mucle cramp.    I have placed POCT urinalysis orders.  The MA is preforming POCT urinalysis orders under the direction of Dr. Mendieta    Please note that this dictation was created using voice recognition software. I have made every reasonable attempt to correct obvious errors, but I expect that there are errors of grammar and possibly content that I did not discover before finalizing the note.

## 2022-02-24 ENCOUNTER — PATIENT MESSAGE (OUTPATIENT)
Dept: MEDICAL GROUP | Facility: MEDICAL CENTER | Age: 82
End: 2022-02-24
Payer: MEDICARE

## 2022-02-24 DIAGNOSIS — E78.5 DYSLIPIDEMIA: ICD-10-CM

## 2022-02-24 RX ORDER — ROSUVASTATIN CALCIUM 5 MG/1
5 TABLET, COATED ORAL EVERY EVENING
Qty: 90 TABLET | Refills: 3 | Status: SHIPPED | OUTPATIENT
Start: 2022-02-24

## 2022-02-25 ENCOUNTER — PATIENT MESSAGE (OUTPATIENT)
Dept: MEDICAL GROUP | Facility: MEDICAL CENTER | Age: 82
End: 2022-02-25
Payer: MEDICARE

## 2022-02-25 NOTE — TELEPHONE ENCOUNTER
I am unable to sign this encounter, and saying that you still have to complete something on urine.    RADHA Garcia.

## 2022-02-25 NOTE — TELEPHONE ENCOUNTER
From: Randi Bello  To: Nurse Practitioner Britni Gross  Sent: 2/24/2022 5:23 PM PST  Subject: rosuvastatin    Orion Ryder,   I am running out of rosuvastatin and wondered if you could send refill order to my Pharmacy if you want me to continue with it. I usually get a 90 day supply each time.  Thanks so much, Randi

## 2022-02-26 NOTE — TELEPHONE ENCOUNTER
From: Randi Bello  To: Nurse Practitioner Britni Gross  Sent: 2/25/2022 5:17 PM PST  Subject: Rosuvastatin    Orion Ryder, yesterday I requested a refill of rosuvastatin but I am now re-thinking taking that prescription any more. I have noticed I have developed stomach pains, nausea and trouble sleeping, also body aching. I didn't have these symptoms before.  What is your take on this ? Sorry to bother you  Thanks, Randi

## 2022-03-01 RX ORDER — ATORVASTATIN CALCIUM 10 MG/1
10 TABLET, FILM COATED ORAL NIGHTLY
Qty: 90 TABLET | Refills: 3 | Status: SHIPPED | OUTPATIENT
Start: 2022-03-01 | End: 2022-12-06

## 2022-03-01 RX ORDER — ATORVASTATIN CALCIUM 10 MG/1
10 TABLET, FILM COATED ORAL NIGHTLY
COMMUNITY
End: 2022-03-01 | Stop reason: SDUPTHER

## 2022-03-09 ENCOUNTER — PHYSICAL THERAPY (OUTPATIENT)
Dept: PHYSICAL THERAPY | Facility: REHABILITATION | Age: 82
End: 2022-03-09
Attending: FAMILY MEDICINE
Payer: MEDICARE

## 2022-03-09 DIAGNOSIS — M79.661 RIGHT CALF PAIN: ICD-10-CM

## 2022-03-09 PROCEDURE — 97110 THERAPEUTIC EXERCISES: CPT

## 2022-03-09 PROCEDURE — 97161 PT EVAL LOW COMPLEX 20 MIN: CPT

## 2022-03-09 SDOH — ECONOMIC STABILITY: GENERAL: QUALITY OF LIFE: GOOD

## 2022-03-09 ASSESSMENT — ENCOUNTER SYMPTOMS
PAIN SCALE: 0
PAIN SCALE AT HIGHEST: 9
PAIN SCALE AT LOWEST: 0

## 2022-03-09 NOTE — OP THERAPY EVALUATION
"  Outpatient Physical Therapy  INITIAL EVALUATION    [facility] [place]  [address]  [facility] [address]  Phone:  [phone]  Fax:  [phone]    Date of Evaluation: 2022    Patient: Randi Bello  YOB: 1940  MRN: [id number]     Referring Provider: RAUDEL Garcia  [address]  [address]  [facility]  [place] [address]   Referring Diagnosis Right calf pain [M79.661]     Time Calculation  Start time: 0900  Stop time: 09 Time Calculation (min): 45 minutes         Chief Complaint: Leg Problem    Visit Diagnoses     ICD-10-CM   1. Right calf pain  M79.661       Date of onset of impairment: 2022    Subjective:   History of Present Illness:     Date of onset:  2022  Quality of life:  Good  Prior level of function:  New onset calf pain R > L  Pain:     Current pain ratin    At best pain ratin    At worst pain ratin  Activities of Daily Living:     Patient reported ADL status: Decreased ambulation tolerance    Patient Goals:     Patient goals for therapy:  Increased motion, increased strength and decreased pain    Patient is a 81 y.o. female that presents to therapy with calf pain R> L. States that symptoms were due to injury use of a massager on her calf. Reports the pain quality to be sharp/dull, intermittent and are primarily R gastroc pain. Reports that symptoms now getting better. States that aggravating factors are walking. States that easng factors are rest.     Past Medical History:   Diagnosis Date   • Anesthesia     Once she had too much anesthesia \"didnt come out of it for 6hours\"   • Anxiety 2016   • Bell palsy 2015   • Chronic fatigue 1/15/2016   • CKD (chronic kidney disease) stage 3, GFR 30-59 ml/min ([facility]) 3/3/2018   • COPD (chronic obstructive pulmonary disease) ([facility])    • Dental disorder     dentures   • Dyslipidemia 2017   • Environmental allergies 2017   • GERD (gastroesophageal reflux disease)  "   • Hair thinning 1/15/2016   • Hiatus hernia syndrome    • History of Bell's palsy 2015   • History of esophageal cancer     esophageal cancer polyp. Repeat endoscopy 3-4 years later was benign.    • Hypertension 2015   • Myocardial infarct ([facility])    • Pneumonia    • Post-menopausal osteoporosis 3/3/2018   • Primary osteoarthritis involving multiple joints 2017   • Raynaud's disease 1/15/2016   • Shingles      Past Surgical History:   Procedure Laterality Date   • CATARACT PHACO WITH IOL Right 11/15/2016    Procedure: CATARACT PHACO WITH IOL;  Surgeon: Gian Bruno M.D.;  Location: SURGERY [facility];  Service:    • CATARACT PHACO WITH IOL Left 2016    Procedure: CATARACT PHACO WITH IOL;  Surgeon: Gian Bruno M.D.;  Location: SURGERY [facility];  Service:    • CAROTID ENDARTERECTOMY  2011    Performed by JB MIN at SURGERY [facility] ORS   • VEIN LIGATION RADIO FREQUENCY  2011    Performed by JB MIN at SURGERY [facility] ORS   • OTHER      matri stem   • OTHER      EGD to remove esophageal polyp   • OTHER CARDIAC SURGERY      angioplasty   • APPENDECTOMY      appendectomy     Social History     Tobacco Use   • Smoking status: Former Smoker     Packs/day: 2.00     Years: 25.00     Pack years: 50.00     Types: Cigarettes     Quit date: 1996     Years since quittin.7   • Smokeless tobacco: Never Used   Substance Use Topics   • Alcohol use: Yes     Alcohol/week: 0.0 oz     Comment: once a month     Family and Occupational History     Socioeconomic History   • Marital status: Single     Spouse name: Not on file   • Number of children: Not on file   • Years of education: Not on file   • Highest education level: Not on file   Occupational History   • Not on file       Objective     Postural Observations  Seated posture: poor  Standing posture: poor        Neurological Testing     Reflexes   Left   Patellar (L4):  normal (2+)  Achilles (S1): trace (1+)  Ankle clonus reflex: negative  Babinski sign: negative    Right   Patellar (L4): normal (2+)  Achilles (S1): trace (1+)  Ankle clonus reflex: negative  Babinski sign: negative    Myotome testing   Lumbar (left)   L1 (hip flexors): 5  L2 (hip flexors): 5  L3 (knee extensors): 5  L4 (ankle dorsiflexors): 5  L5 (great toe extension): 5  S1 (ankle plantar flexors): 4+    Lumbar (right)   L1 (hip flexors): 5  L2 (hip flexors): 5  L3 (knee extensors): 4+  L4 (ankle dorsiflexors): 5  L5 (great toe extension): 5  S1 (ankle plantar flexors): 4+    Dermatome testing   Lumbar (left)   All left lumbar dermatomes intact    Lumbar (right)   All right lumbar dermatomes intact    Active Range of Motion     Lumbar   Flexion: Lumbar active flexion: 80deg.  Extension: Lumbar active extension: 20deg.  Left Ankle/Foot   Dorsiflexion (ke): 3 degrees   Dorsiflexion (kf): 12 degrees     Right Ankle/Foot   Dorsiflexion (ke): 0 degrees   Dorsiflexion (kf): 8 degrees     Strength:      Left Hip   Planes of Motion   Abduction: 3+  Adduction: 4    Right Hip   Planes of Motion   Abduction: 3+  Adduction: 4    Left Knee   Flexion: 5    Right Knee   Flexion: 5    Left Ankle/Foot   Inversion: 5  Eversion: 5    Right Ankle/Foot   Inversion: 5  Eversion: 5    Tests     Left Hip   SLR: Negative.     Right Hip   SLR: Negative.         Therapeutic Exercises (CPT 94378):     1. Calf stretch, 3q51miv    2. Trial flexion during gait, x5min edu      Time-based treatments/modalities:    Physical Therapy Timed Treatment Charges  Therapeutic exercise minutes (CPT 12466): 10 minutes      Assessment, Response and Plan:   Impairments: abnormal or restricted ROM, activity intolerance, impaired physical strength and pain with function    Assessment details:  Patient presents with signs and symptoms consistent with a resolving gastroc injury vs lumbar origin of symptoms. Patient limitations include weakness, decreased ROM, and  pain.  Patient will benefit from skilled therapy to improve the aforementioned deficits and decrease further functional decline.   Prognosis: fair    Goals:   Short Term Goals:   1) Patient's hip abd strength will improve by a half muscle grade to facilitate improved gait.  2) Patient's symptoms will improve to facilitate ambulation >100ft.  Short term goal time span:  2-4 weeks      Long Term Goals:    1) Patient's symptoms will improve to allow for return to fitness walking.  2) Patient will fill out LBDI at next session  Long term goal time span:  6-8 weeks    Plan:   Therapy options:  Physical therapy treatment to continue  Planned therapy interventions:  E Stim Unattended (CPT 72889), Manual Therapy (CPT 78355), Neuromuscular Re-education (CPT 09805), Therapeutic Exercise (CPT 22372) and Hot or Cold Pack Therapy (CPT 86919)  Frequency:  2x week  Duration in weeks:  8  Discussed with:  Patient      Functional Assessment Used  PT Functional Assessment Tool Used: LBDI  PT Functional Assessment Score: At next session     Referring provider co-signature:  I have reviewed this plan of care and my co-signature certifies the need for services.    Certification Period: 03/09/2022 to  05/04/22    Physician Signature: ________________________________ Date: ______________

## 2022-03-16 ENCOUNTER — PHYSICAL THERAPY (OUTPATIENT)
Dept: PHYSICAL THERAPY | Facility: REHABILITATION | Age: 82
End: 2022-03-16
Attending: FAMILY MEDICINE
Payer: MEDICARE

## 2022-03-16 DIAGNOSIS — M79.661 RIGHT CALF PAIN: ICD-10-CM

## 2022-03-16 PROCEDURE — 97110 THERAPEUTIC EXERCISES: CPT

## 2022-03-16 NOTE — OP THERAPY DAILY TREATMENT
Outpatient Physical Therapy  DAILY TREATMENT     [facility] [place]  [address], [address]  [facility] [address]  Phone:  [phone]  Fax:  [phone]    Date: 03/16/2022    Patient: Randi Bello  YOB: 1940  MRN: [id number]     Time Calculation    Start time: 0900  Stop time: 0945 Time Calculation (min): 45 minutes         Chief Complaint: Leg Problem    Visit #: 2    SUBJECTIVE:  Patient reports that symptoms have improved slightly.     OBJECTIVE:  Current objective measures:           Therapeutic Exercises (CPT 25520):     1. Nu Step, x10min L1/L3    2. Shuttle 4cc, x4min, DL L 4    3. Ankle rocker, x30 1/2 foam roller    4. Ankle rocker blue stretch, 9p27phc    5. Soccer ball kicks side to side, x5min    6. Ankle circles, x30    7. Sit to stand, x20    8. Heel raises on towel, x20    9. Standing on foam pad, x5min    10. Standing on 1/2 foam roller, x5min    11. Shuttle trial heel raises, DNT      Time-based treatments/modalities:    Physical Therapy Timed Treatment Charges  Therapeutic exercise minutes (CPT 73324): 40 minutes      Pain rating (1-10) before treatment:  1  Pain rating (1-10) after treatment:  1    ASSESSMENT:   Response to treatment: Patient responded well to therapy with an overall decrease in symptoms and no increase in pain. Plan to continue with gastroc loading and progress as able.     PLAN/RECOMMENDATIONS:   Plan for treatment: therapy treatment to continue next visit.  Planned interventions for next visit: continue with current treatment.

## 2022-03-22 ENCOUNTER — PATIENT MESSAGE (OUTPATIENT)
Dept: MEDICAL GROUP | Facility: MEDICAL CENTER | Age: 82
End: 2022-03-22
Payer: MEDICARE

## 2022-03-22 DIAGNOSIS — M15.9 PRIMARY OSTEOARTHRITIS INVOLVING MULTIPLE JOINTS: ICD-10-CM

## 2022-03-22 RX ORDER — HYDROCODONE BITARTRATE AND ACETAMINOPHEN 5; 325 MG/1; MG/1
1 TABLET ORAL EVERY 12 HOURS PRN
Qty: 60 TABLET | Refills: 0 | Status: CANCELLED
Start: 2022-03-22 | End: 2022-04-21

## 2022-03-22 NOTE — PROGRESS NOTES
Patient requesting a refill of her Norco.  Controlled substance agreement form was signed by her previous provider and there is not one that the patient signed with me.  Patient is also requesting a refill via [facility] and has not requested this in person.  It has been more than 1 year since patient did provide a urine drug screen.  Patient notified that I am unable to provide her refills that will be happy to refer her to pain management.

## 2022-03-22 NOTE — TELEPHONE ENCOUNTER
From: Randi Bello  To: Nurse Practitioner Britni Gross  Sent: 3/22/2022 4:13 PM PDT  Subject: presc.    Orion Ryder, I have been taking pain medication for arthritits in my spine and am running out.   It is the Hydrocodone-Acetamin 5-325 mg.  Could you please call in a refill to my pharmacy ?    Thanks so much, Randi

## 2022-03-23 ENCOUNTER — APPOINTMENT (OUTPATIENT)
Dept: PHYSICAL THERAPY | Facility: REHABILITATION | Age: 82
End: 2022-03-23
Attending: FAMILY MEDICINE
Payer: MEDICARE

## 2022-03-30 ENCOUNTER — APPOINTMENT (OUTPATIENT)
Dept: PHYSICAL THERAPY | Facility: REHABILITATION | Age: 82
End: 2022-03-30
Attending: FAMILY MEDICINE
Payer: MEDICARE

## 2022-04-06 ENCOUNTER — APPOINTMENT (OUTPATIENT)
Dept: PHYSICAL THERAPY | Facility: REHABILITATION | Age: 82
End: 2022-04-06
Attending: FAMILY MEDICINE
Payer: MEDICARE

## 2022-04-13 ENCOUNTER — APPOINTMENT (OUTPATIENT)
Dept: PHYSICAL THERAPY | Facility: REHABILITATION | Age: 82
End: 2022-04-13
Attending: FAMILY MEDICINE
Payer: MEDICARE

## 2022-04-16 ENCOUNTER — TELEPHONE (OUTPATIENT)
Dept: HEALTH INFORMATION MANAGEMENT | Facility: OTHER | Age: 82
End: 2022-04-16
Payer: MEDICARE

## 2022-04-20 ENCOUNTER — APPOINTMENT (OUTPATIENT)
Dept: PHYSICAL THERAPY | Facility: REHABILITATION | Age: 82
End: 2022-04-20
Attending: FAMILY MEDICINE
Payer: MEDICARE

## 2022-04-25 NOTE — TELEPHONE ENCOUNTER
From: Randi Bello  To: RAUDEL Cazares  Sent: 3/18/2020 8:44 AM PDT  Subject: Non-Urgent Medical Question    Indy Nazario, I think I was concentrating on Coronavirus so much, I was ignoring the fact that I have had sinus drainage and the usual symptoms of a sinus infection starting. Am drinking benitez liquids and getting a lot of rest. Just hope it doesn't get ahold of me. Thanks again, Randi Bello (Sherri)      ----- Message -----   From:RAUDEL Cazares   Sent:3/18/2020 7:18 AM PDT   To:Randi Bello   Subject:RE: Non-Urgent Medical Question    You could be coming down with a slight virus, I would just suggest that you stay home and rest. Be sure you are getting lots of fluids. If you had a fever over 101 or begin to feel any other symptoms please let me know.      ----- Message -----   From:Randi Bello   Sent:3/17/2020 4:04 PM PDT   To:RAUDEL Cazares   Subject:Non-Urgent Medical Question    Orion Nazario, I think this is non-urgent. I started feeling a little warm and took my temp with an outdated thermometer. On Friday it was 97. something, today, now, it is 99.7. Anything to worry about ?  Randi Bello [phone]   Helical Rim Text: The closure involved the helical rim.

## 2022-04-27 ENCOUNTER — APPOINTMENT (OUTPATIENT)
Dept: PHYSICAL THERAPY | Facility: REHABILITATION | Age: 82
End: 2022-04-27
Attending: FAMILY MEDICINE
Payer: MEDICARE

## 2022-05-03 ENCOUNTER — PATIENT MESSAGE (OUTPATIENT)
Dept: MEDICAL GROUP | Facility: MEDICAL CENTER | Age: 82
End: 2022-05-03
Payer: MEDICARE

## 2022-05-03 DIAGNOSIS — I25.2 HISTORY OF MI (MYOCARDIAL INFARCTION): ICD-10-CM

## 2022-05-04 ENCOUNTER — APPOINTMENT (OUTPATIENT)
Dept: PHYSICAL THERAPY | Facility: REHABILITATION | Age: 82
End: 2022-05-04
Attending: FAMILY MEDICINE
Payer: MEDICARE

## 2022-05-04 RX ORDER — CLOPIDOGREL BISULFATE 75 MG/1
75 TABLET ORAL
Qty: 90 TABLET | Refills: 3 | Status: SHIPPED | OUTPATIENT
Start: 2022-05-04 | End: 2023-05-04

## 2022-05-17 ENCOUNTER — APPOINTMENT (OUTPATIENT)
Dept: MEDICAL GROUP | Facility: MEDICAL CENTER | Age: 82
End: 2022-05-17
Payer: MEDICARE

## 2022-05-27 ENCOUNTER — PATIENT MESSAGE (OUTPATIENT)
Dept: MEDICAL GROUP | Facility: MEDICAL CENTER | Age: 82
End: 2022-05-27
Payer: MEDICARE

## 2022-05-27 DIAGNOSIS — K21.9 GASTROESOPHAGEAL REFLUX DISEASE WITHOUT ESOPHAGITIS: ICD-10-CM

## 2022-05-27 DIAGNOSIS — I10 ESSENTIAL HYPERTENSION: ICD-10-CM

## 2022-05-27 RX ORDER — OMEPRAZOLE 40 MG/1
40 CAPSULE, DELAYED RELEASE ORAL
Qty: 90 CAPSULE | Refills: 1 | Status: SHIPPED | OUTPATIENT
Start: 2022-05-27 | End: 2022-05-31

## 2022-05-27 RX ORDER — HYDROCHLOROTHIAZIDE 25 MG/1
25 TABLET ORAL
Qty: 90 TABLET | Refills: 1 | Status: SHIPPED | OUTPATIENT
Start: 2022-05-27 | End: 2022-11-28 | Stop reason: SDUPTHER

## 2022-07-09 ENCOUNTER — TELEPHONE (OUTPATIENT)
Dept: HEALTH INFORMATION MANAGEMENT | Facility: OTHER | Age: 82
End: 2022-07-09
Payer: MEDICARE

## 2022-07-22 ENCOUNTER — HOSPITAL ENCOUNTER (OUTPATIENT)
Dept: RADIOLOGY | Facility: MEDICAL CENTER | Age: 82
End: 2022-07-22
Attending: PHYSICIAN ASSISTANT
Payer: MEDICARE

## 2022-07-22 DIAGNOSIS — R09.89 ABNORMAL CHEST SOUNDS: ICD-10-CM

## 2022-07-22 DIAGNOSIS — I73.9 PERIPHERAL VASCULAR DISEASE, UNSPECIFIED (HCC): ICD-10-CM

## 2022-07-22 PROCEDURE — 76775 US EXAM ABDO BACK WALL LIM: CPT

## 2022-07-22 PROCEDURE — 93922 UPR/L XTREMITY ART 2 LEVELS: CPT | Mod: 26 | Performed by: INTERNAL MEDICINE

## 2022-07-22 PROCEDURE — 93925 LOWER EXTREMITY STUDY: CPT

## 2022-07-22 PROCEDURE — 93925 LOWER EXTREMITY STUDY: CPT | Mod: 26 | Performed by: INTERNAL MEDICINE

## 2022-07-22 PROCEDURE — 93922 UPR/L XTREMITY ART 2 LEVELS: CPT

## 2022-09-02 ENCOUNTER — HOSPITAL ENCOUNTER (OUTPATIENT)
Dept: LAB | Facility: MEDICAL CENTER | Age: 82
End: 2022-09-02
Attending: FAMILY MEDICINE
Payer: MEDICARE

## 2022-09-02 DIAGNOSIS — E78.5 DYSLIPIDEMIA: ICD-10-CM

## 2022-09-02 LAB
CHOLEST SERPL-MCNC: 129 MG/DL (ref 100–199)
FASTING STATUS PATIENT QL REPORTED: NORMAL
HDLC SERPL-MCNC: 36 MG/DL
LDLC SERPL CALC-MCNC: 77 MG/DL
TRIGL SERPL-MCNC: 80 MG/DL (ref 0–149)

## 2022-09-02 PROCEDURE — 80061 LIPID PANEL: CPT

## 2022-09-02 PROCEDURE — 36415 COLL VENOUS BLD VENIPUNCTURE: CPT

## 2022-09-04 ENCOUNTER — APPOINTMENT (OUTPATIENT)
Dept: RADIOLOGY | Facility: MEDICAL CENTER | Age: 82
End: 2022-09-04
Attending: EMERGENCY MEDICINE
Payer: MEDICARE

## 2022-09-04 ENCOUNTER — HOSPITAL ENCOUNTER (EMERGENCY)
Facility: MEDICAL CENTER | Age: 82
End: 2022-09-04
Attending: EMERGENCY MEDICINE
Payer: MEDICARE

## 2022-09-04 VITALS
DIASTOLIC BLOOD PRESSURE: 75 MMHG | BODY MASS INDEX: 24.91 KG/M2 | SYSTOLIC BLOOD PRESSURE: 177 MMHG | HEART RATE: 67 BPM | HEIGHT: 66 IN | RESPIRATION RATE: 18 BRPM | TEMPERATURE: 98 F | OXYGEN SATURATION: 92 % | WEIGHT: 155 LBS

## 2022-09-04 DIAGNOSIS — S00.03XA CONTUSION OF SCALP, INITIAL ENCOUNTER: Primary | ICD-10-CM
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
## 2022-09-04 DIAGNOSIS — S09.90XA CLOSED HEAD INJURY, INITIAL ENCOUNTER: ICD-10-CM

## 2022-09-04 PROCEDURE — 70450 CT HEAD/BRAIN W/O DYE: CPT

## 2022-09-04 PROCEDURE — 99284 EMERGENCY DEPT VISIT MOD MDM: CPT | Mod: 25

## 2022-09-04 ASSESSMENT — FIBROSIS 4 INDEX: FIB4 SCORE: 1.1

## 2022-09-05 NOTE — ED NOTES
Patient given discharge instructions and education. Verbalizes understanding. Given chance to ask questions. Patient educated on signs and symptoms to returned to ER, Educated patient they can return for any concerning symptoms. Patient states they have their belongings. Denies additional needs at this time. Reports pain is well controlled. Patient monitored every hour and PRN for safety and comfort. Patient taken to ED Lobby via wheelchair.  Standby assist to vehicle .

## 2022-09-05 NOTE — ED PROVIDER NOTES
ED Provider Note    Scribed for Gian Gonzalez by Charu Sierra. 9/4/2022  6:39 PM    Primary care provider: RAUDEL Garcia  Means of arrival: EMS  History obtained from: Patient  History limited by: None    CHIEF COMPLAINT  Chief Complaint   Patient presents with    T-5000 GLF     Patient slipped over a rug while attempting to get out of the shower. Hit back of head. Denies LOC. Takes Plavix daily following an MI with stent placement in 1996. Alert and oriented x 4.      HPI  Randi Bello is a 82 y.o. female who presents to the Emergency Department via EMS for a T-5000 ground level fall onset one hour prior to arrival. She has a history of  a heart attack and has heart stents in place. The patient reports that she was getting out of the shower when she slipped on her rug. She reports symptoms of head trauma, but denies symptoms of loss of consciousness, chest pain, shortness of breath, nausea, vomiting, or any other injuries. The patient notes she is on Plavix.     Quality: Dull Ache  Duration: 1 hour   Severity: Mild  Associated sx: Female    REVIEW OF SYSTEMS  As above, all other systems reviewed and are negative.   See HPI for further details.     PAST MEDICAL HISTORY   has a past medical history of Anesthesia, Anxiety (6/9/2016), Bell palsy (5/14/2015), Chronic fatigue (1/15/2016), CKD (chronic kidney disease) stage 3, GFR 30-59 ml/min ([facility]) (3/3/2018), COPD (chronic obstructive pulmonary disease) ([facility]), Dental disorder, Dyslipidemia (1/4/2017), Environmental allergies (7/5/2017), GERD (gastroesophageal reflux disease), Hair thinning (1/15/2016), Hiatus hernia syndrome, History of Bell's palsy (5/14/2015), History of esophageal cancer, Hypertension (4/1/2015), Myocardial infarct ([facility]) (1996), Pneumonia (2007), Post-menopausal osteoporosis (3/3/2018), Primary osteoarthritis involving multiple joints (12/12/2017), Raynaud's disease (1/15/2016), and Shingles (2014).  SURGICAL  HISTORY   has a past surgical history that includes appendectomy (); other cardiac surgery (); other (); other (); vein ligation radio frequency (2011); carotid endarterectomy (2011); cataract phaco with iol (Left, 2016); and cataract phaco with iol (Right, 11/15/2016).  SOCIAL HISTORY  Social History     Tobacco Use    Smoking status: Former     Packs/day: 2.00     Years: 25.00     Pack years: 50.00     Types: Cigarettes     Quit date: 1996     Years since quittin.2    Smokeless tobacco: Never   Vaping Use    Vaping Use: Never used   Substance Use Topics    Alcohol use: Yes     Alcohol/week: 0.0 oz     Comment: once a month    Drug use: No      Social History     Substance and Sexual Activity   Drug Use No     FAMILY HISTORY  Family History   Problem Relation Age of Onset    Cancer Father         lung, smoker    Cancer Brother         kidney, liver    Arthritis Sister         Rheumatoid Arthritis     CURRENT MEDICATIONS  Current Outpatient Medications   Medication Instructions    albuterol 108 (90 Base) MCG/ACT Aero Soln inhalation aerosol 2 Puffs, Inhalation, EVERY 6 HOURS PRN    aspirin (ASA) 325 mg, Oral, DAILY, Pt takes one tablet daily but took and extra ASPIRIN 325MG today    atorvastatin (LIPITOR) 10 mg, Oral, NIGHTLY    Cholecalciferol (D3 VITAMIN PO) 1 Capsule, Oral, DAILY    clopidogrel (PLAVIX) 75 mg, Oral, EVERY DAY    diclofenac sodium (VOLTAREN) 4 g, Apply externally, EVERY 8 HOURS PRN    Ferrous Sulfate (IRON PO) 1 Tablet, Oral, DAILY    Flovent  mcg, Inhalation, 2 TIMES DAILY    fluticasone (FLONASE) 50 mcg, Nasal, DAILY    hydroCHLOROthiazide (HYDRODIURIL) 25 mg, Oral, EVERY DAY    pantoprazole (PROTONIX) 40 mg, Oral, DAILY    Probiotic Product (PROBIOTIC PO) Oral    rosuvastatin (CRESTOR) 5 mg, Oral, EVERY EVENING     ALLERGIES  Allergies   Allergen Reactions    Doxycycline Nausea     Pt also experienced Vertigo on this medication.   Pt reports that  "this medication did not help her     PHYSICAL EXAM    VITAL SIGNS:   Vitals:    09/04/22 1823 09/04/22 1839 09/04/22 1840   BP: (!) 143/62  (!) 177/75   Pulse: 73  67   Resp: 18  18   Temp: 36.7 °C (98.1 °F)  36.7 °C (98 °F)   TempSrc: Temporal     SpO2: 97%  92%   Weight:  70.3 kg (155 lb)    Height:  1.676 m (5' 6\")      Vitals: My interpretation: hypertensive, not tachycardic, afebrile, not hypoxic    Reinterpretation of vitals: Unchanged    Cardiac Monitor Interpretation: The cardiac monitor revealed normal Sinus Rhythm as interpreted by me. The cardiac monitor was ordered secondary to the patient's history of hypertension, fall and to monitor for dysrhythmia and/or tachycardia.    PE:   Constitutional: Well developed, Well nourished, No acute distress, Non-toxic appearance.   HENT: Normocephalic, small contusion to the occiput, no tenderness in the neck, no skin breakdown, Bilateral external ears normal, Oropharynx is clear mucous membranes are moist. No oral exudates or nasal discharge.   Eyes: Pupils are equal round and reactive, EOMI, Conjunctiva normal, No discharge.   Neck: Normal range of motion, No tenderness, Supple, No stridor. No meningismus.  Lymphatic: No lymphadenopathy noted.   Cardiovascular: Regular rate and rhythm without murmur rub or gallop.  Thorax & Lungs: Clear breath sounds bilaterally without wheezes, rhonchi or rales. There is no chest wall tenderness.   Abdomen: Soft non-tender non-distended. There is no rebound or guarding. No organomegaly is appreciated. Bowel sounds are normal.  Skin: Normal without rash.   Back: No CVA or spinal tenderness.   Extremities: Intact distal pulses, No edema, No tenderness, No cyanosis, No clubbing. Capillary refill is less than 2 seconds.  Musculoskeletal: Good range of motion in all major joints. No tenderness to palpation or major deformities noted.   Neurologic: Alert & oriented x 3, Normal motor function, Normal sensory function, No focal deficits " noted. Reflexes are normal.  Psychiatric: Affect normal, Judgment normal, Mood normal. There is no suicidal ideation or patient reported hallucinations.     DIAGNOSTIC STUDIES / PROCEDURES  RADIOLOGY  CT-HEAD W/O   Final Result      No acute intracranial abnormality           The radiologist's interpretation of all radiological studies have been reviewed by me.    COURSE & MEDICAL DECISION MAKING  Nursing notes, VS, PMSFHx, labs, imaging, EKG reviewed in chart.    MDM: 6:23 PM Randi Bello is a 82 y.o. female who presented with scalp contusion after mechanical fall in her bathroom when she slipped on a rug and fell backwards, hit her head.  No loss conscious.  She is on Plavix.  Called EMS who recommended coming for CT scan.  Arrives as a TBI alert.  She is alert and oriented x4, has a small contusion to the occiput but no neck tenderness, an unremarkable trauma exam otherwise and has no complaints.  Neurologically she is intact.  She underwent emergent CT imaging for head trauma on Plavix, CT was negative.  Vital signs are fairly unremarkable other than borderline hypertension she will follow-up with PCP for this.  She is alert, oriented, has a repeat benign exam, is back to her baseline per her and her daughter.  Discussed outpatient follow-up, ice to the back of her bruised head, Tylenol as needed and close return precautions if there were any changes in mental status, nausea or vomiting or concerns.  Patient daughter verbalized understanding plan and are amenable.    Patient has had high blood pressure while in the emergency department, felt likely secondary to medical condition. Counseled patient to monitor blood pressure at home and follow up with primary care physician.      FINAL IMPRESSION  1. Contusion of scalp, initial encounter Acute   2. Fall, initial encounter Acute   3. Closed head injury, initial encounter Acute       I, Charu Sierra (Scribe), am scribing for, and in the presence of,  Gian Gonzalez.    Electronically signed by: Charu Sierra (Scrib), 9/4/2022    The note accurately reflects work and decisions made by me.  Gian Gonzalez  9/4/2022  6:56 PM

## 2022-09-05 NOTE — ED TRIAGE NOTES
Chief Complaint   Patient presents with    T-5000 GLF     Patient slipped over a rug while attempting to get out of the shower. Hit back of head. Denies LOC. Takes Plavix daily following an MI with stent placement in 1996. Alert and oriented x 4.

## 2022-09-08 ENCOUNTER — HOSPITAL ENCOUNTER (OUTPATIENT)
Dept: LAB | Facility: MEDICAL CENTER | Age: 82
End: 2022-09-08
Attending: SURGERY
Payer: MEDICARE

## 2022-09-08 LAB
ANION GAP SERPL CALC-SCNC: 11 MMOL/L (ref 7–16)
BASOPHILS # BLD AUTO: 0.6 % (ref 0–1.8)
BASOPHILS # BLD: 0.07 K/UL (ref 0–0.12)
BUN SERPL-MCNC: 13 MG/DL (ref 8–22)
CALCIUM SERPL-MCNC: 8.9 MG/DL (ref 8.5–10.5)
CHLORIDE SERPL-SCNC: 103 MMOL/L (ref 96–112)
CO2 SERPL-SCNC: 24 MMOL/L (ref 20–33)
CREAT SERPL-MCNC: 1.1 MG/DL (ref 0.5–1.4)
EOSINOPHIL # BLD AUTO: 0.18 K/UL (ref 0–0.51)
EOSINOPHIL NFR BLD: 1.5 % (ref 0–6.9)
ERYTHROCYTE [DISTWIDTH] IN BLOOD BY AUTOMATED COUNT: 47.9 FL (ref 35.9–50)
GFR SERPLBLD CREATININE-BSD FMLA CKD-EPI: 50 ML/MIN/1.73 M 2
GLUCOSE SERPL-MCNC: 104 MG/DL (ref 65–99)
HCT VFR BLD AUTO: 39 % (ref 37–47)
HGB BLD-MCNC: 12.7 G/DL (ref 12–16)
IMM GRANULOCYTES # BLD AUTO: 0.08 K/UL (ref 0–0.11)
IMM GRANULOCYTES NFR BLD AUTO: 0.7 % (ref 0–0.9)
LYMPHOCYTES # BLD AUTO: 3.86 K/UL (ref 1–4.8)
LYMPHOCYTES NFR BLD: 32.5 % (ref 22–41)
MCH RBC QN AUTO: 33.4 PG (ref 27–33)
MCHC RBC AUTO-ENTMCNC: 32.6 G/DL (ref 33.6–35)
MCV RBC AUTO: 102.6 FL (ref 81.4–97.8)
MONOCYTES # BLD AUTO: 0.74 K/UL (ref 0–0.85)
MONOCYTES NFR BLD AUTO: 6.2 % (ref 0–13.4)
NEUTROPHILS # BLD AUTO: 6.95 K/UL (ref 2–7.15)
NEUTROPHILS NFR BLD: 58.5 % (ref 44–72)
NRBC # BLD AUTO: 0 K/UL
NRBC BLD-RTO: 0 /100 WBC
PLATELET # BLD AUTO: 310 K/UL (ref 164–446)
PMV BLD AUTO: 11 FL (ref 9–12.9)
POTASSIUM SERPL-SCNC: 3.4 MMOL/L (ref 3.6–5.5)
RBC # BLD AUTO: 3.8 M/UL (ref 4.2–5.4)
SODIUM SERPL-SCNC: 138 MMOL/L (ref 135–145)
WBC # BLD AUTO: 11.9 K/UL (ref 4.8–10.8)

## 2022-09-08 PROCEDURE — 80048 BASIC METABOLIC PNL TOTAL CA: CPT

## 2022-09-08 PROCEDURE — 36415 COLL VENOUS BLD VENIPUNCTURE: CPT

## 2022-09-08 PROCEDURE — 85025 COMPLETE CBC W/AUTO DIFF WBC: CPT

## 2022-11-03 ENCOUNTER — DOCUMENTATION (OUTPATIENT)
Dept: HEALTH INFORMATION MANAGEMENT | Facility: OTHER | Age: 82
End: 2022-11-03
Payer: MEDICARE

## 2022-11-28 ENCOUNTER — PATIENT MESSAGE (OUTPATIENT)
Dept: MEDICAL GROUP | Facility: MEDICAL CENTER | Age: 82
End: 2022-11-28
Payer: MEDICARE

## 2022-11-28 DIAGNOSIS — I10 ESSENTIAL HYPERTENSION: ICD-10-CM

## 2022-11-28 RX ORDER — HYDROCHLOROTHIAZIDE 25 MG/1
25 TABLET ORAL
Qty: 90 TABLET | Refills: 1 | Status: SHIPPED | OUTPATIENT
Start: 2022-11-28

## 2022-12-06 ENCOUNTER — OFFICE VISIT (OUTPATIENT)
Dept: URGENT CARE | Facility: PHYSICIAN GROUP | Age: 82
End: 2022-12-06
Payer: MEDICARE

## 2022-12-06 VITALS
TEMPERATURE: 97.4 F | HEIGHT: 66 IN | SYSTOLIC BLOOD PRESSURE: 144 MMHG | OXYGEN SATURATION: 93 % | HEART RATE: 72 BPM | RESPIRATION RATE: 18 BRPM | WEIGHT: 163 LBS | BODY MASS INDEX: 26.2 KG/M2 | DIASTOLIC BLOOD PRESSURE: 78 MMHG

## 2022-12-06 DIAGNOSIS — J01.00 ACUTE NON-RECURRENT MAXILLARY SINUSITIS: Primary | ICD-10-CM

## 2022-12-06 PROCEDURE — 99213 OFFICE O/P EST LOW 20 MIN: CPT | Performed by: PHYSICIAN ASSISTANT

## 2022-12-06 RX ORDER — AMOXICILLIN AND CLAVULANATE POTASSIUM 875; 125 MG/1; MG/1
1 TABLET, FILM COATED ORAL 2 TIMES DAILY
Qty: 14 TABLET | Refills: 0 | Status: SHIPPED | OUTPATIENT
Start: 2022-12-06 | End: 2022-12-13

## 2022-12-06 ASSESSMENT — FIBROSIS 4 INDEX: FIB4 SCORE: 1.1

## 2022-12-06 NOTE — PROGRESS NOTES
Subjective:   Randi Bello is a 82 y.o. female who presents for Sinusitis      HPI  The patient presents to the Urgent Care with complaints of possible sinus infection.  Onset of symptoms a couple weeks.  Sinus tension, frontal headaches, postnasal drip cough.  Nasal congestion. Denies any fever, chest pain, SOB, vision changes. Nonsmoker.         Medications:    albuterol Aers  aspirin Tabs  atorvastatin Tabs  clopidogrel Tabs  D3 VITAMIN PO  diclofenac sodium Gel  Flovent HFA Aero  fluticasone  hydroCHLOROthiazide Tabs  IRON PO  pantoprazole Tbec  PROBIOTIC PO  rosuvastatin Tabs    Allergies: Doxycycline    Problem List: Randi Bello does not have any pertinent problems on file.    Surgical History:  Past Surgical History:   Procedure Laterality Date    CATARACT PHACO WITH IOL Right 11/15/2016    Procedure: CATARACT PHACO WITH IOL;  Surgeon: Gian Bruno M.D.;  Location: SURGERY [facility];  Service:     CATARACT PHACO WITH IOL Left 11/1/2016    Procedure: CATARACT PHACO WITH IOL;  Surgeon: Gian Bruno M.D.;  Location: [facility] ORS;  Service:     CAROTID ENDARTERECTOMY  11/8/2011    Performed by JB MIN at SURGERY [facility] ORS    VEIN LIGATION RADIO FREQUENCY  2/22/2011    Performed by JB MIN at SURGERY [facility] ORS    OTHER  2010    matri stem    OTHER  2007    EGD to remove esophageal polyp    OTHER CARDIAC SURGERY  1996    angioplasty    APPENDECTOMY  1957    appendectomy       Past Social Hx: Randi Bello  reports that she quit smoking about 26 years ago. Her smoking use included cigarettes. She has a 50.00 pack-year smoking history. She has never used smokeless tobacco. She reports current alcohol use. She reports that she does not use drugs.     Past Family Hx:  Randi Bello family history includes Arthritis in her sister; Cancer in her brother and father.     Problem list, medications, and allergies reviewed by myself today  "in Epic.     Objective:     BP (!) 144/78   Pulse 72   Temp 36.3 °C (97.4 °F)   Resp 18   Ht 1.676 m (5' 6\")   Wt 73.9 kg (163 lb)   SpO2 93%   BMI 26.31 kg/m²     Physical Exam  Vitals reviewed.   Constitutional:       General: She is not in acute distress.     Appearance: Normal appearance. She is not ill-appearing or toxic-appearing.   HENT:      Nose: Mucosal edema and rhinorrhea present. Rhinorrhea is purulent.      Right Sinus: Maxillary sinus tenderness present.      Left Sinus: Maxillary sinus tenderness present.      Mouth/Throat:      Mouth: Mucous membranes are moist.      Pharynx: Oropharynx is clear.   Eyes:      Conjunctiva/sclera: Conjunctivae normal.      Pupils: Pupils are equal, round, and reactive to light.   Cardiovascular:      Rate and Rhythm: Normal rate and regular rhythm.      Heart sounds: Normal heart sounds.   Pulmonary:      Effort: Pulmonary effort is normal. No respiratory distress.      Breath sounds: Normal breath sounds. No wheezing, rhonchi or rales.   Musculoskeletal:      Cervical back: Neck supple.   Lymphadenopathy:      Cervical: No cervical adenopathy.   Skin:     General: Skin is warm.   Neurological:      General: No focal deficit present.      Mental Status: She is alert and oriented to person, place, and time.   Psychiatric:         Mood and Affect: Mood normal.         Behavior: Behavior normal.       Diagnosis and associated orders:     1. Acute non-recurrent maxillary sinusitis  - amoxicillin-clavulanate (AUGMENTIN) 875-125 MG Tab; Take 1 Tablet by mouth 2 times a day for 7 days.  Dispense: 14 Tablet; Refill: 0     Comments/MDM:     Patient's presenting symptoms and exam findings are consistent with sinusitis consistent with bacterial etiology.   I recommend plenty of fluids, Flonase nasal spray, nasal saline washes or chase pot, Mucinex decongestant, Ibuprofen or Tylenol for pain, non-drowsy antihistamine such as Zyrtec or Claritin.      I personally reviewed " prior external notes and test results pertinent to today's visit. Pathogenesis of diagnosis discussed including typical length and natural progression. Supportive care, natural history, differential diagnoses, and indications for immediate follow-up discussed. Patient expresses understanding and agrees to plan. Patient denies any other questions or concerns.     Follow-up with the primary care physician for recheck, reevaluation, and consideration of further management.    Please note that this dictation was created using voice recognition software. I have made a reasonable attempt to correct obvious errors, but I expect that there are errors of grammar and possibly content that I did not discover before finalizing the note.    This note was electronically signed by Melchor Kirby PA-C

## 2022-12-30 ENCOUNTER — OFFICE VISIT (OUTPATIENT)
Dept: URGENT CARE | Facility: PHYSICIAN GROUP | Age: 82
End: 2022-12-30
Payer: MEDICARE

## 2022-12-30 ENCOUNTER — HOSPITAL ENCOUNTER (OUTPATIENT)
Facility: MEDICAL CENTER | Age: 82
End: 2022-12-30
Attending: NURSE PRACTITIONER
Payer: MEDICARE

## 2022-12-30 VITALS
SYSTOLIC BLOOD PRESSURE: 136 MMHG | OXYGEN SATURATION: 94 % | DIASTOLIC BLOOD PRESSURE: 80 MMHG | RESPIRATION RATE: 18 BRPM | TEMPERATURE: 96.8 F | WEIGHT: 155 LBS | HEIGHT: 66 IN | HEART RATE: 80 BPM | BODY MASS INDEX: 24.91 KG/M2

## 2022-12-30 DIAGNOSIS — R30.0 DYSURIA: ICD-10-CM

## 2022-12-30 DIAGNOSIS — N30.01 ACUTE CYSTITIS WITH HEMATURIA: ICD-10-CM

## 2022-12-30 LAB
APPEARANCE UR: ABNORMAL
BILIRUB UR STRIP-MCNC: ABNORMAL MG/DL
COLOR UR AUTO: YELLOW
GLUCOSE UR STRIP.AUTO-MCNC: ABNORMAL MG/DL
KETONES UR STRIP.AUTO-MCNC: ABNORMAL MG/DL
LEUKOCYTE ESTERASE UR QL STRIP.AUTO: ABNORMAL
NITRITE UR QL STRIP.AUTO: ABNORMAL
PH UR STRIP.AUTO: 6 [PH] (ref 5–8)
PROT UR QL STRIP: ABNORMAL MG/DL
RBC UR QL AUTO: ABNORMAL
SP GR UR STRIP.AUTO: 1.01
UROBILINOGEN UR STRIP-MCNC: 0.2 MG/DL

## 2022-12-30 PROCEDURE — 87086 URINE CULTURE/COLONY COUNT: CPT

## 2022-12-30 PROCEDURE — 99213 OFFICE O/P EST LOW 20 MIN: CPT | Performed by: NURSE PRACTITIONER

## 2022-12-30 PROCEDURE — 81002 URINALYSIS NONAUTO W/O SCOPE: CPT | Performed by: NURSE PRACTITIONER

## 2022-12-30 RX ORDER — CIPROFLOXACIN 500 MG/1
500 TABLET, FILM COATED ORAL 2 TIMES DAILY
Qty: 14 TABLET | Refills: 0 | Status: SHIPPED | OUTPATIENT
Start: 2022-12-30 | End: 2023-01-06

## 2022-12-30 ASSESSMENT — ENCOUNTER SYMPTOMS
GASTROINTESTINAL NEGATIVE: 1
RESPIRATORY NEGATIVE: 1
CONSTITUTIONAL NEGATIVE: 1
CARDIOVASCULAR NEGATIVE: 1
FEVER: 0
EYES NEGATIVE: 1
MUSCULOSKELETAL NEGATIVE: 1
NEUROLOGICAL NEGATIVE: 1

## 2022-12-30 ASSESSMENT — FIBROSIS 4 INDEX: FIB4 SCORE: 1.1

## 2022-12-30 NOTE — PROGRESS NOTES
"Subjective:   Randi Bello is a 82 y.o. female who presents for Urinary Frequency (X 2 days, painful urination )      Urinary Frequency  This is a new problem. Episode onset: 2 days. The problem occurs constantly. The problem has been gradually worsening. Pertinent negatives include no fever. Associated symptoms comments: Negative low back/flank pain, negative suprapubic pain. Nothing aggravates the symptoms. She has tried NSAIDs and drinking for the symptoms. The treatment provided no relief.     Review of Systems   Constitutional: Negative.  Negative for fever.   HENT: Negative.     Eyes: Negative.    Respiratory: Negative.     Cardiovascular: Negative.    Gastrointestinal: Negative.    Genitourinary:  Positive for dysuria, frequency and urgency.   Musculoskeletal: Negative.    Skin: Negative.    Neurological: Negative.      Medications, Allergies, and current problem list reviewed today in Epic.     Objective:     /80   Pulse 80   Temp 36 °C (96.8 °F) (Temporal)   Resp 18   Ht 1.676 m (5' 6\")   Wt 70.3 kg (155 lb)   SpO2 94%     Physical Exam  Vitals reviewed.   Constitutional:       Appearance: Normal appearance.   HENT:      Head: Normocephalic and atraumatic.      Nose: Nose normal.      Mouth/Throat:      Mouth: Mucous membranes are moist.      Pharynx: Oropharynx is clear.   Eyes:      Extraocular Movements: Extraocular movements intact.      Conjunctiva/sclera: Conjunctivae normal.      Pupils: Pupils are equal, round, and reactive to light.   Cardiovascular:      Rate and Rhythm: Normal rate and regular rhythm.      Pulses: Normal pulses.      Heart sounds: Normal heart sounds.   Pulmonary:      Effort: Pulmonary effort is normal.      Breath sounds: Normal breath sounds.   Abdominal:      General: Abdomen is flat.      Palpations: Abdomen is soft.   Musculoskeletal:         General: Normal range of motion.      Cervical back: Normal range of motion and neck supple.   Skin:     General: " Skin is warm and dry.      Capillary Refill: Capillary refill takes less than 2 seconds.   Neurological:      General: No focal deficit present.      Mental Status: She is alert.   Psychiatric:         Mood and Affect: Mood normal.         Behavior: Behavior normal.       Lab Results/POC Test Results   Results for orders placed or performed in visit on 12/30/22   POCT Urinalysis   Result Value Ref Range    POC Color yellow Negative    POC Appearance cloudy Negative    POC Leukocyte Esterase moderate Negative    POC Nitrites neg Negative    POC Urobiligen 0.2 Negative (0.2) mg/dL    POC Protein neg Negative mg/dL    POC Urine PH 6.0 5.0 - 8.0    POC Blood moderate Negative    POC Specific Gravity 1.015 <1.005 - >1.030    POC Ketones neg Negative mg/dL    POC Bilirubin neg Negative mg/dL    POC Glucose neg Negative mg/dL           Assessment/Plan:     Diagnosis and associated orders:     1. Acute cystitis with hematuria  ciprofloxacin (CIPRO) 500 MG Tab      2. Dysuria  POCT Urinalysis    URINE CULTURE(NEW)         Comments/MDM:     The patient's presenting symptoms and exam findings are consistent with a simple urinary tract infection. They are overall very well-appearing with normal vital signs and benign examination findings.   Increase fluid intake.  Urine culture: will call back only if positive and if necessary change in therapy.   Advised to return to the Urgent Care or follow up with their PCP if symptoms are not improving in 2-3 days or sooner if any worsening symptoms such as fever, chills, abdominal pain, back/flank pain, nausea, vomiting, or any other concerns.            Differential diagnosis, natural history, supportive care, and indications for immediate follow-up discussed.    Advised the patient to follow-up with the primary care physician for recheck, reevaluation, and consideration of further management.    Please note that this dictation was created using voice recognition software. I have made a  reasonable attempt to correct obvious errors, but I expect that there are errors of grammar and possibly content that I did not discover before finalizing the note.    This note was electronically signed by BERNY Peters

## 2023-01-01 LAB
BACTERIA UR CULT: NORMAL
SIGNIFICANT IND 70042: NORMAL
SITE SITE: NORMAL
SOURCE SOURCE: NORMAL

## 2023-04-05 NOTE — ASSESSMENT & PLAN NOTE
Chronic issue well-controlled at this time with over-the-counter Tylenol and as needed use of Norco, up to twice daily on days that she is particularly active.  She is getting good pain relief with use of medication, denies any concerning side effects including constipation, sedation.  She is unable to take NSAIDs due to chronic kidney disease.  Opiate risk score 0.   report reviewed, no aberrant behavior.  We will update UDS and controlled substance agreement today   yes...

## 2023-05-04 DIAGNOSIS — I25.2 HISTORY OF MI (MYOCARDIAL INFARCTION): ICD-10-CM

## 2023-05-04 RX ORDER — CLOPIDOGREL BISULFATE 75 MG/1
75 TABLET ORAL
Qty: 90 TABLET | Refills: 3 | Status: SHIPPED | OUTPATIENT
Start: 2023-05-04

## 2023-06-30 NOTE — TELEPHONE ENCOUNTER
Referral placed  
Was the patient seen in the last year in this department? Yes     Does patient have an active prescription for medications requested? Yes    Received Request Via: Patient     Pt requesting 90 day supply of omeprazole.     Pt also states she was seen by pulmonology recently and they recommended she obtain a referral to see cardiology.     
N/A

## 2023-10-18 ENCOUNTER — APPOINTMENT (RX ONLY)
Dept: URBAN - METROPOLITAN AREA CLINIC 22 | Facility: CLINIC | Age: 83
Setting detail: DERMATOLOGY
End: 2023-10-18

## 2023-10-18 DIAGNOSIS — Z71.89 OTHER SPECIFIED COUNSELING: ICD-10-CM

## 2023-10-18 DIAGNOSIS — L82.1 OTHER SEBORRHEIC KERATOSIS: ICD-10-CM

## 2023-10-18 DIAGNOSIS — L81.4 OTHER MELANIN HYPERPIGMENTATION: ICD-10-CM

## 2023-10-18 DIAGNOSIS — D18.0 HEMANGIOMA: ICD-10-CM

## 2023-10-18 DIAGNOSIS — D22 MELANOCYTIC NEVI: ICD-10-CM

## 2023-10-18 PROBLEM — D18.01 HEMANGIOMA OF SKIN AND SUBCUTANEOUS TISSUE: Status: ACTIVE | Noted: 2023-10-18

## 2023-10-18 PROBLEM — D22.5 MELANOCYTIC NEVI OF TRUNK: Status: ACTIVE | Noted: 2023-10-18

## 2023-10-18 PROCEDURE — ? SUNSCREEN RECOMMENDATIONS

## 2023-10-18 PROCEDURE — ? ADDITIONAL NOTES

## 2023-10-18 PROCEDURE — 99203 OFFICE O/P NEW LOW 30 MIN: CPT

## 2023-10-18 PROCEDURE — ? COUNSELING

## 2023-10-18 ASSESSMENT — LOCATION DETAILED DESCRIPTION DERM
LOCATION DETAILED: LEFT SUPERIOR LATERAL MIDBACK
LOCATION DETAILED: LEFT LATERAL ABDOMEN
LOCATION DETAILED: RIGHT MID-UPPER BACK
LOCATION DETAILED: LEFT INFERIOR UPPER BACK
LOCATION DETAILED: RIGHT INFERIOR CENTRAL MALAR CHEEK
LOCATION DETAILED: LEFT PROXIMAL DORSAL FOREARM

## 2023-10-18 ASSESSMENT — LOCATION ZONE DERM
LOCATION ZONE: ARM
LOCATION ZONE: FACE
LOCATION ZONE: TRUNK

## 2023-10-18 ASSESSMENT — LOCATION SIMPLE DESCRIPTION DERM
LOCATION SIMPLE: ABDOMEN
LOCATION SIMPLE: LEFT UPPER BACK
LOCATION SIMPLE: RIGHT CHEEK
LOCATION SIMPLE: RIGHT UPPER BACK
LOCATION SIMPLE: LEFT LOWER BACK
LOCATION SIMPLE: LEFT FOREARM

## 2023-10-18 NOTE — HPI: SECONDARY COMPLAINT
How Severe Is This Condition?: moderate
Additional History: Daughter is concerned with lesions on back.

## 2023-10-18 NOTE — PROCEDURE: MIPS QUALITY
Quality 111:Pneumonia Vaccination Status For Older Adults: Patient received any pneumococcal conjugate or polysaccharide vaccine on or after their 60th birthday and before the end of the measurement period
Quality 130: Documentation Of Current Medications In The Medical Record: Current Medications Documented
Quality 110: Preventive Care And Screening: Influenza Immunization: Influenza Immunization Administered during Influenza season
Detail Level: Detailed
Quality 431: Preventive Care And Screening: Unhealthy Alcohol Use - Screening: Patient not identified as an unhealthy alcohol user when screened for unhealthy alcohol use using a systematic screening method
Quality 226: Preventive Care And Screening: Tobacco Use: Screening And Cessation Intervention: Patient screened for tobacco use and is an ex/non-smoker

## 2023-11-27 NOTE — DISCHARGE INSTRUCTIONS
11/21/2023  Vascular US Duplex Carotid Bilateral      No stenosis in the right internal carotid artery. No stenosis in the left internal carotid artery. Normal antegrade flow involving the right vertebral artery. Normal antegrade flow involving the left vertebral artery. 11/21/2023 echo      Left Ventricle: Normal left ventricular systolic function with a visually estimated EF of 55 - 60%. Left ventricle size is normal. Normal wall thickness. Normal wall motion. Normal diastolic function. Mitral Valve: Mild to moderate regurgitation. Tricuspid Valve: Mild regurgitation. The estimated RVSP is 31 mmHg. Pericardium: No pericardial effusion.         not set up    PT NOTIFIED OF RESULTS AND ADVISED TO KEEP FUTURE APPT Thankfully your CT scan of your head was negative for any signs of bleeding or injury.  You do have a large bruise in the back your head which she can put ice on to help with swelling and take Tylenol to help with pain.  Follow-up with your PCP as needed.  If you have any concerns, please return to the ED.  Thank you for coming in today.

## 2024-04-30 ENCOUNTER — TELEPHONE (OUTPATIENT)
Dept: PHYSICAL THERAPY | Facility: REHABILITATION | Age: 84
End: 2024-04-30

## 2024-04-30 NOTE — OP THERAPY DISCHARGE SUMMARY
Outpatient Physical Therapy  DISCHARGE SUMMARY NOTE      [facility]  [address], [address]  [place] [address]  Phone:  [phone]  Fax:  [phone]    Date of Visit: 04/30/2024    Patient: Randi Bello  YOB: 1940  MRN: [id number]     Referring Provider: RAUDEL Garcia    Referring Diagnosis Right calf pain [M79.661]            Comments:  Patient will now be discharged due to a lapse in the plan of care. Administrative discharge to close this case.        Limitations Remaining:  unknown    Recommendations:  Patient will now be discharged due to a lapse in the plan of care. Administrative discharge to close this case.       Roge Wilson, PT, DPT    Date: 4/30/2024

## 2024-05-11 DIAGNOSIS — I25.2 HISTORY OF MI (MYOCARDIAL INFARCTION): ICD-10-CM

## 2024-05-13 RX ORDER — CLOPIDOGREL BISULFATE 75 MG/1
75 TABLET ORAL
Qty: 90 TABLET | Refills: 3 | Status: SHIPPED | OUTPATIENT
Start: 2024-05-13

## 2025-06-05 DIAGNOSIS — I25.2 HISTORY OF MI (MYOCARDIAL INFARCTION): ICD-10-CM

## 2025-06-05 RX ORDER — CLOPIDOGREL BISULFATE 75 MG/1
75 TABLET ORAL
Qty: 90 TABLET | Refills: 3 | Status: SHIPPED | OUTPATIENT
Start: 2025-06-05

## 2025-06-05 NOTE — TELEPHONE ENCOUNTER
Received request via: Patient    Was the patient seen in the last year in this department? No    Does the patient have an active prescription (recently filled or refills available) for medication(s) requested? No    [facility]    Does the patient have halfway Plus and need 100-day supply? (This applies to ALL medications) Patient does not have SCP